# Patient Record
Sex: FEMALE | Race: WHITE | Employment: UNEMPLOYED | URBAN - METROPOLITAN AREA
[De-identification: names, ages, dates, MRNs, and addresses within clinical notes are randomized per-mention and may not be internally consistent; named-entity substitution may affect disease eponyms.]

---

## 2017-02-24 DIAGNOSIS — F33.1 MODERATE EPISODE OF RECURRENT MAJOR DEPRESSIVE DISORDER (HCC): ICD-10-CM

## 2017-03-01 RX ORDER — SERTRALINE HYDROCHLORIDE 100 MG/1
TABLET, FILM COATED ORAL
Qty: 30 TABLET | Refills: 1 | Status: SHIPPED | OUTPATIENT
Start: 2017-03-01 | End: 2017-05-11 | Stop reason: SDUPTHER

## 2017-04-04 DIAGNOSIS — J01.01 ACUTE RECURRENT MAXILLARY SINUSITIS: ICD-10-CM

## 2017-04-04 DIAGNOSIS — R06.2 WHEEZING SYMPTOM: ICD-10-CM

## 2017-04-04 RX ORDER — MONTELUKAST SODIUM 10 MG/1
TABLET ORAL
Qty: 30 TABLET | Refills: 3 | Status: SHIPPED | OUTPATIENT
Start: 2017-04-04 | End: 2017-07-22 | Stop reason: SDUPTHER

## 2017-05-02 ENCOUNTER — OFFICE VISIT (OUTPATIENT)
Dept: FAMILY MEDICINE CLINIC | Age: 27
End: 2017-05-02

## 2017-05-02 VITALS
DIASTOLIC BLOOD PRESSURE: 89 MMHG | WEIGHT: 293 LBS | HEART RATE: 94 BPM | BODY MASS INDEX: 44.41 KG/M2 | TEMPERATURE: 97.9 F | HEIGHT: 68 IN | SYSTOLIC BLOOD PRESSURE: 145 MMHG

## 2017-05-02 DIAGNOSIS — G43.109 MIGRAINE WITH AURA AND WITHOUT STATUS MIGRAINOSUS, NOT INTRACTABLE: ICD-10-CM

## 2017-05-02 DIAGNOSIS — F40.01 PANIC DISORDER WITH AGORAPHOBIA: ICD-10-CM

## 2017-05-02 DIAGNOSIS — R79.82 ELEVATED C-REACTIVE PROTEIN (CRP): ICD-10-CM

## 2017-05-02 DIAGNOSIS — E55.9 VITAMIN D DEFICIENCY: ICD-10-CM

## 2017-05-02 DIAGNOSIS — E78.00 HYPERCHOLESTEREMIA: ICD-10-CM

## 2017-05-02 DIAGNOSIS — E66.01 OBESITY, CLASS III, BMI 40-49.9 (MORBID OBESITY) (HCC): ICD-10-CM

## 2017-05-02 DIAGNOSIS — R06.2 WHEEZING: ICD-10-CM

## 2017-05-02 DIAGNOSIS — Z88.9 MULTIPLE ALLERGIES: ICD-10-CM

## 2017-05-02 DIAGNOSIS — R19.7 DIARRHEA, UNSPECIFIED TYPE: ICD-10-CM

## 2017-05-02 DIAGNOSIS — I15.8 OTHER SECONDARY HYPERTENSION: Primary | ICD-10-CM

## 2017-05-02 LAB
ANION GAP SERPL CALCULATED.3IONS-SCNC: 16 MEQ/L (ref 8–16)
BASOPHILS # BLD: 0.3 %
BASOPHILS ABSOLUTE: 0 THOU/MM3 (ref 0–0.1)
BUN BLDV-MCNC: 11 MG/DL (ref 7–22)
CALCIUM SERPL-MCNC: 9.5 MG/DL (ref 8.5–10.5)
CHLORIDE BLD-SCNC: 104 MEQ/L (ref 98–111)
CHOLESTEROL, TOTAL: 202 MG/DL (ref 100–199)
CO2: 23 MEQ/L (ref 23–33)
CREAT SERPL-MCNC: 0.4 MG/DL (ref 0.4–1.2)
CREATININE URINE POCT: NORMAL
EOSINOPHIL # BLD: 1.8 %
EOSINOPHILS ABSOLUTE: 0.1 THOU/MM3 (ref 0–0.4)
GFR SERPL CREATININE-BSD FRML MDRD: > 90 ML/MIN/1.73M2
GLUCOSE BLD-MCNC: 119 MG/DL (ref 70–108)
HCT VFR BLD CALC: 41.4 % (ref 37–47)
HDLC SERPL-MCNC: 42 MG/DL
HEMOGLOBIN: 13.9 GM/DL (ref 12–16)
LDL CHOLESTEROL CALCULATED: 129 MG/DL
LYMPHOCYTES # BLD: 26.4 %
LYMPHOCYTES ABSOLUTE: 2.1 THOU/MM3 (ref 1–4.8)
MAGNESIUM: 2 MG/DL (ref 1.6–2.4)
MCH RBC QN AUTO: 29.5 PG (ref 27–31)
MCHC RBC AUTO-ENTMCNC: 33.6 GM/DL (ref 33–37)
MCV RBC AUTO: 87.8 FL (ref 81–99)
MICROALBUMIN/CREAT 24H UR: NORMAL MG/G{CREAT}
MICROALBUMIN/CREAT UR-RTO: NORMAL
MONOCYTES # BLD: 5.7 %
MONOCYTES ABSOLUTE: 0.5 THOU/MM3 (ref 0.4–1.3)
NUCLEATED RED BLOOD CELLS: 0 /100 WBC
PDW BLD-RTO: 13.9 % (ref 11.5–14.5)
PLATELET # BLD: 251 THOU/MM3 (ref 130–400)
PMV BLD AUTO: 8.5 MCM (ref 7.4–10.4)
POTASSIUM SERPL-SCNC: 4.4 MEQ/L (ref 3.5–5.2)
PTH INTACT: 71.2 PG/ML (ref 15–65)
RBC # BLD: 4.72 MILL/MM3 (ref 4.2–5.4)
RBC # BLD: NORMAL 10*6/UL
SEDIMENTATION RATE, ERYTHROCYTE: 12 MM/HR (ref 0–20)
SEG NEUTROPHILS: 65.8 %
SEGMENTED NEUTROPHILS ABSOLUTE COUNT: 5.3 THOU/MM3 (ref 1.8–7.7)
SODIUM BLD-SCNC: 143 MEQ/L (ref 135–145)
TRIGL SERPL-MCNC: 156 MG/DL (ref 0–199)
TSH SERPL DL<=0.05 MIU/L-ACNC: 2.46 MCIU/ML (ref 0.4–4.2)
VITAMIN D 25-HYDROXY: 22 NG/ML (ref 30–100)
WBC # BLD: 8 THOU/MM3 (ref 4.8–10.8)

## 2017-05-02 PROCEDURE — 82044 UR ALBUMIN SEMIQUANTITATIVE: CPT | Performed by: FAMILY MEDICINE

## 2017-05-02 PROCEDURE — 36415 COLL VENOUS BLD VENIPUNCTURE: CPT | Performed by: FAMILY MEDICINE

## 2017-05-02 PROCEDURE — 99214 OFFICE O/P EST MOD 30 MIN: CPT | Performed by: FAMILY MEDICINE

## 2017-05-02 RX ORDER — BLOOD PRESSURE TEST KIT
KIT MISCELLANEOUS
Qty: 1 KIT | Refills: 0 | Status: SHIPPED | OUTPATIENT
Start: 2017-05-02 | End: 2021-07-27

## 2017-05-02 RX ORDER — SUMATRIPTAN 50 MG/1
50 TABLET, FILM COATED ORAL
Qty: 9 TABLET | Refills: 3 | Status: SHIPPED | OUTPATIENT
Start: 2017-05-02 | End: 2017-06-01 | Stop reason: SDUPTHER

## 2017-05-02 RX ORDER — TOPIRAMATE 25 MG/1
25 TABLET ORAL NIGHTLY
Qty: 120 TABLET | Refills: 3 | Status: SHIPPED | OUTPATIENT
Start: 2017-05-02 | End: 2017-08-22 | Stop reason: SDUPTHER

## 2017-05-03 LAB — THYROXINE (T4): 10 UG/DL (ref 4.5–12)

## 2017-05-04 LAB
C-REACTIVE PROTEIN, HIGH SENSITIVITY: 16.3 MG/L
THYROGLOBULIN: NORMAL
THYROID PEROXIDASE ANTIBODY: < 0.3 IU/ML (ref 0–9)

## 2017-05-05 ENCOUNTER — TELEPHONE (OUTPATIENT)
Dept: FAMILY MEDICINE CLINIC | Age: 27
End: 2017-05-05

## 2017-05-05 ASSESSMENT — ENCOUNTER SYMPTOMS
TROUBLE SWALLOWING: 0
SHORTNESS OF BREATH: 0
VOMITING: 0
CONSTIPATION: 0
NAUSEA: 0
BLOOD IN STOOL: 0
ABDOMINAL PAIN: 0
COUGH: 0
DIARRHEA: 0
EYE PAIN: 0
WHEEZING: 1

## 2017-05-09 ENCOUNTER — TELEPHONE (OUTPATIENT)
Dept: FAMILY MEDICINE CLINIC | Age: 27
End: 2017-05-09

## 2017-05-09 DIAGNOSIS — J01.00 ACUTE NON-RECURRENT MAXILLARY SINUSITIS: Primary | ICD-10-CM

## 2017-05-09 LAB
ALLERGEN BARLEY IGE: < 0.1 KU/L
ALLERGEN BEEF: < 0.1 KU/L
ALLERGEN BERMUDA GRASS IGE: < 0.1 KU/L
ALLERGEN BIRCH IGE: < 0.1 KU/L
ALLERGEN BOX ELDER: 0.33 KU/L
ALLERGEN CABBAGE IGE: < 0.1 KU/L
ALLERGEN CARROT IGE: < 0.1 KU/L
ALLERGEN CAT DANDER IGE: < 0.1 KU/L
ALLERGEN CHICKEN IGE: < 0.1 KU/L
ALLERGEN CODFISH IGE: < 0.1 KU/L
ALLERGEN COMMON SHORT RAGWEED IGE: < 0.1 KU/L
ALLERGEN CORN IGE: < 0.1 KU/L
ALLERGEN COTTONWOOD: < 0.1 KU/L
ALLERGEN CRAB IGE: < 0.1 KU/L
ALLERGEN D. FARINAE (DUST MITE): < 0.1 KU/L
ALLERGEN DOG DANDER IGE: < 0.1 KU/L
ALLERGEN EGG WHITE IGE: < 0.1 KU/L
ALLERGEN ELM IGE: < 0.1 KU/L
ALLERGEN FUNGI/MOLD A. ALTERNATA IGE: < 0.1 KU/L
ALLERGEN FUNGI/MOLD A. FUMIGATUS IGE: < 0.1 KU/L
ALLERGEN FUNGI/MOLD M.RACEMOSUS IGE: < 0.1 KU/L
ALLERGEN FUNGI/MOLD P. NOTATUM IGE: < 0.1 KU/L
ALLERGEN GERMAN COCKROACH IGE: < 0.1 KU/L
ALLERGEN GRAPE IGE: < 0.1 KU/L
ALLERGEN INTERPRETATION/SCORE: NORMAL
ALLERGEN LETTUCE IGE: < 0.1 KU/L
ALLERGEN MILK IGE: < 0.1 KU/L
ALLERGEN MITE DUST PTERONYSSINUS IGE: < 0.1 KU/L
ALLERGEN MOUNTAIN CEDAR: < 0.1 KU/L
ALLERGEN MOUSE EPITHELIA IGE: < 0.1 KU/L
ALLERGEN NAVY BEAN: < 0.1 KU/L
ALLERGEN OAT: < 0.1 KU/L
ALLERGEN ORANGE IGE: < 0.1 KU/L
ALLERGEN PEANUT (F13) IGE: < 0.1 KU/L
ALLERGEN PECAN TREE IGE: < 0.1 KU/L
ALLERGEN PEPPER C. ANNUUM IGE: < 0.1 KU/L
ALLERGEN PORK: < 0.1 KU/L
ALLERGEN POTATO IGE: < 0.1 KU/L
ALLERGEN RICE IGE: < 0.1 KU/L
ALLERGEN RUSSIAN THISTLE IGE: < 0.1 KU/L
ALLERGEN RYE IGE: < 0.1 KU/L
ALLERGEN SHEEP SORREL (W18) IGE: < 0.1 KU/L
ALLERGEN SHRIMP IGE: < 0.1 KU/L
ALLERGEN SOYBEAN IGE: < 0.1 KU/L
ALLERGEN TIMOTHY GRASS: < 0.1 KU/L
ALLERGEN TOMATO IGE: < 0.1 KU/L
ALLERGEN TREE SYCAMORE: < 0.1 KU/L
ALLERGEN TUNA IGE: < 0.1 KU/L
ALLERGEN WALNUT TREE IGE: < 0.1 KU/L
ALLERGEN WEED, PIGWEED IGE: < 0.1 KU/L
ALLERGEN WHEAT IGE: < 0.1 KU/L
ALLERGEN WHITE ASH: < 0.1 KU/L
ALLERGEN WHITE MULBERRY TREE, IGE: < 0.1 KU/L
ALLERGEN, FUNGI/MOLD: < 0.1 KU/L
ALLERGEN, TREE, OAK: < 0.1 KU/L
IMMUNOGLOBULIN E: 8 KU/L

## 2017-05-09 RX ORDER — GUAIFENESIN 600 MG/1
600 TABLET, EXTENDED RELEASE ORAL 2 TIMES DAILY
Qty: 20 TABLET | Refills: 1 | Status: SHIPPED | OUTPATIENT
Start: 2017-05-09

## 2017-05-09 RX ORDER — AZITHROMYCIN 250 MG/1
TABLET, FILM COATED ORAL
Qty: 1 PACKET | Refills: 0 | Status: SHIPPED | OUTPATIENT
Start: 2017-05-09 | End: 2017-05-19

## 2017-05-11 ENCOUNTER — TELEPHONE (OUTPATIENT)
Dept: FAMILY MEDICINE CLINIC | Age: 27
End: 2017-05-11

## 2017-05-11 DIAGNOSIS — F33.1 MODERATE EPISODE OF RECURRENT MAJOR DEPRESSIVE DISORDER (HCC): ICD-10-CM

## 2017-05-11 RX ORDER — SERTRALINE HYDROCHLORIDE 100 MG/1
TABLET, FILM COATED ORAL
Qty: 30 TABLET | Refills: 1 | Status: SHIPPED | OUTPATIENT
Start: 2017-05-11 | End: 2017-05-30

## 2017-06-01 ENCOUNTER — OFFICE VISIT (OUTPATIENT)
Dept: FAMILY MEDICINE CLINIC | Age: 27
End: 2017-06-01

## 2017-06-01 VITALS
OXYGEN SATURATION: 96 % | BODY MASS INDEX: 44.41 KG/M2 | DIASTOLIC BLOOD PRESSURE: 74 MMHG | TEMPERATURE: 98.2 F | HEIGHT: 68 IN | SYSTOLIC BLOOD PRESSURE: 133 MMHG | WEIGHT: 293 LBS | HEART RATE: 71 BPM

## 2017-06-01 DIAGNOSIS — G43.109 MIGRAINE WITH AURA AND WITHOUT STATUS MIGRAINOSUS, NOT INTRACTABLE: ICD-10-CM

## 2017-06-01 DIAGNOSIS — E55.9 VITAMIN D DEFICIENCY: ICD-10-CM

## 2017-06-01 DIAGNOSIS — R63.5 WEIGHT GAIN: Primary | ICD-10-CM

## 2017-06-01 DIAGNOSIS — J01.00 ACUTE NON-RECURRENT MAXILLARY SINUSITIS: ICD-10-CM

## 2017-06-01 DIAGNOSIS — F40.01 PANIC DISORDER WITH AGORAPHOBIA: ICD-10-CM

## 2017-06-01 DIAGNOSIS — J01.01 ACUTE RECURRENT MAXILLARY SINUSITIS: ICD-10-CM

## 2017-06-01 DIAGNOSIS — F41.9 ANXIETY: ICD-10-CM

## 2017-06-01 DIAGNOSIS — R06.2 WHEEZING SYMPTOM: ICD-10-CM

## 2017-06-01 DIAGNOSIS — E66.01 OBESITY, CLASS III, BMI 40-49.9 (MORBID OBESITY) (HCC): ICD-10-CM

## 2017-06-01 PROCEDURE — 99214 OFFICE O/P EST MOD 30 MIN: CPT | Performed by: NURSE PRACTITIONER

## 2017-06-01 RX ORDER — AMPICILLIN TRIHYDRATE 250 MG
CAPSULE ORAL
Qty: 120 CAPSULE | Refills: 5 | Status: SHIPPED | OUTPATIENT
Start: 2017-06-01 | End: 2019-01-02 | Stop reason: SDUPTHER

## 2017-06-01 RX ORDER — SERTRALINE HYDROCHLORIDE 100 MG/1
150 TABLET, FILM COATED ORAL DAILY
COMMUNITY
End: 2017-06-01 | Stop reason: SDUPTHER

## 2017-06-01 RX ORDER — CHLORAL HYDRATE 500 MG
1000 CAPSULE ORAL 3 TIMES DAILY
Qty: 90 CAPSULE | Refills: 3 | Status: CANCELLED | OUTPATIENT
Start: 2017-06-01

## 2017-06-01 RX ORDER — TOPIRAMATE 25 MG/1
25 TABLET ORAL NIGHTLY
Qty: 120 TABLET | Refills: 3 | Status: CANCELLED | OUTPATIENT
Start: 2017-06-01

## 2017-06-01 RX ORDER — MONTELUKAST SODIUM 10 MG/1
TABLET ORAL
Qty: 30 TABLET | Refills: 3 | Status: CANCELLED | OUTPATIENT
Start: 2017-06-01

## 2017-06-01 RX ORDER — GUAIFENESIN 600 MG/1
600 TABLET, EXTENDED RELEASE ORAL 2 TIMES DAILY
Qty: 20 TABLET | Refills: 1 | Status: CANCELLED | OUTPATIENT
Start: 2017-06-01

## 2017-06-01 RX ORDER — BUSPIRONE HYDROCHLORIDE 5 MG/1
5 TABLET ORAL 3 TIMES DAILY
Qty: 90 TABLET | Refills: 1 | Status: SHIPPED | OUTPATIENT
Start: 2017-06-01 | End: 2019-01-02 | Stop reason: SDUPTHER

## 2017-06-01 RX ORDER — SERTRALINE HYDROCHLORIDE 100 MG/1
150 TABLET, FILM COATED ORAL DAILY
Qty: 45 TABLET | Refills: 2 | Status: SHIPPED | OUTPATIENT
Start: 2017-06-01 | End: 2018-01-24 | Stop reason: SDUPTHER

## 2017-06-01 RX ORDER — FLUTICASONE PROPIONATE 50 MCG
1 SPRAY, SUSPENSION (ML) NASAL DAILY
Qty: 1 BOTTLE | Refills: 1 | Status: CANCELLED | OUTPATIENT
Start: 2017-06-01

## 2017-06-01 RX ORDER — SUMATRIPTAN 50 MG/1
50 TABLET, FILM COATED ORAL
Qty: 9 TABLET | Refills: 3 | Status: SHIPPED | OUTPATIENT
Start: 2017-06-01 | End: 2019-01-02 | Stop reason: SDUPTHER

## 2017-06-01 ASSESSMENT — ENCOUNTER SYMPTOMS
RHINORRHEA: 0
SHORTNESS OF BREATH: 0
SORE THROAT: 0
ABDOMINAL PAIN: 0
EYE REDNESS: 0
DIARRHEA: 0
BLOOD IN STOOL: 0
CONSTIPATION: 0
COUGH: 0
ANAL BLEEDING: 0
ABDOMINAL DISTENTION: 0
COLOR CHANGE: 0
NAUSEA: 0
EYE DISCHARGE: 0

## 2017-07-22 DIAGNOSIS — R06.2 WHEEZING SYMPTOM: ICD-10-CM

## 2017-07-22 DIAGNOSIS — J01.01 ACUTE RECURRENT MAXILLARY SINUSITIS: ICD-10-CM

## 2017-07-25 RX ORDER — MONTELUKAST SODIUM 10 MG/1
TABLET ORAL
Qty: 30 TABLET | Refills: 3 | Status: SHIPPED | OUTPATIENT
Start: 2017-07-25 | End: 2017-11-13 | Stop reason: SDUPTHER

## 2017-08-22 DIAGNOSIS — G43.109 MIGRAINE WITH AURA AND WITHOUT STATUS MIGRAINOSUS, NOT INTRACTABLE: ICD-10-CM

## 2017-08-22 RX ORDER — TOPIRAMATE 25 MG/1
TABLET ORAL
Qty: 120 TABLET | Refills: 3 | Status: SHIPPED | OUTPATIENT
Start: 2017-08-22 | End: 2019-04-16 | Stop reason: SDUPTHER

## 2017-10-23 DIAGNOSIS — E66.01 OBESITY, CLASS III, BMI 40-49.9 (MORBID OBESITY) (HCC): ICD-10-CM

## 2017-10-23 RX ORDER — FLUTICASONE PROPIONATE 50 MCG
SPRAY, SUSPENSION (ML) NASAL
Qty: 1 BOTTLE | Refills: 1 | Status: SHIPPED | OUTPATIENT
Start: 2017-10-23 | End: 2019-01-02 | Stop reason: SDUPTHER

## 2017-10-23 RX ORDER — MAGNESIUM GLUCONATE 27 MG(500)
1000 TABLET ORAL 3 TIMES DAILY
Qty: 90 CAPSULE | Refills: 3 | Status: SHIPPED | OUTPATIENT
Start: 2017-10-23 | End: 2019-01-02 | Stop reason: SDUPTHER

## 2017-10-27 ENCOUNTER — HOSPITAL ENCOUNTER (OUTPATIENT)
Age: 27
Discharge: HOME | End: 2017-10-27
Payer: MEDICARE

## 2017-10-27 ENCOUNTER — TELEPHONE (OUTPATIENT)
Dept: FAMILY MEDICINE CLINIC | Age: 27
End: 2017-10-27

## 2017-10-27 ENCOUNTER — HOSPITAL ENCOUNTER (OUTPATIENT)
Dept: ULTRASOUND IMAGING | Age: 27
Discharge: HOME | End: 2017-10-27
Payer: MEDICARE

## 2017-10-27 ENCOUNTER — OFFICE VISIT (OUTPATIENT)
Dept: FAMILY MEDICINE CLINIC | Age: 27
End: 2017-10-27
Payer: MEDICARE

## 2017-10-27 VITALS
DIASTOLIC BLOOD PRESSURE: 82 MMHG | WEIGHT: 293 LBS | HEIGHT: 68 IN | BODY MASS INDEX: 44.41 KG/M2 | TEMPERATURE: 98 F | HEART RATE: 81 BPM | SYSTOLIC BLOOD PRESSURE: 134 MMHG | OXYGEN SATURATION: 93 %

## 2017-10-27 DIAGNOSIS — Z78.9 USES BIRTH CONTROL: ICD-10-CM

## 2017-10-27 DIAGNOSIS — T36.95XA ANTIBIOTIC-INDUCED YEAST INFECTION: ICD-10-CM

## 2017-10-27 DIAGNOSIS — R39.15 URINARY URGENCY: ICD-10-CM

## 2017-10-27 DIAGNOSIS — B37.9 ANTIBIOTIC-INDUCED YEAST INFECTION: ICD-10-CM

## 2017-10-27 DIAGNOSIS — R31.0 GROSS HEMATURIA: ICD-10-CM

## 2017-10-27 DIAGNOSIS — R31.0 GROSS HEMATURIA: Primary | ICD-10-CM

## 2017-10-27 LAB
ALBUMIN SERPL-MCNC: 4.3 G/DL (ref 3.5–5.1)
ALP BLD-CCNC: 91 U/L (ref 38–126)
ALT SERPL-CCNC: 13 U/L (ref 11–66)
ANION GAP SERPL CALCULATED.3IONS-SCNC: 16 MEQ/L (ref 8–16)
ANISOCYTOSIS: ABNORMAL
AST SERPL-CCNC: 13 U/L (ref 5–40)
BASOPHILS # BLD: 0.3 %
BASOPHILS ABSOLUTE: 0 THOU/MM3 (ref 0–0.1)
BILIRUB SERPL-MCNC: 0.3 MG/DL (ref 0.3–1.2)
BUN BLDV-MCNC: 10 MG/DL (ref 7–22)
CALCIUM SERPL-MCNC: 9.6 MG/DL (ref 8.5–10.5)
CHLORIDE BLD-SCNC: 99 MEQ/L (ref 98–111)
CO2: 23 MEQ/L (ref 23–33)
CREAT SERPL-MCNC: 0.5 MG/DL (ref 0.4–1.2)
EOSINOPHIL # BLD: 0.8 %
EOSINOPHILS ABSOLUTE: 0.1 THOU/MM3 (ref 0–0.4)
GFR SERPL CREATININE-BSD FRML MDRD: > 90 ML/MIN/1.73M2
GLUCOSE BLD-MCNC: 85 MG/DL (ref 70–108)
HCT VFR BLD CALC: 42.9 % (ref 37–47)
HEMOGLOBIN: 14.3 GM/DL (ref 12–16)
LYMPHOCYTES # BLD: 16.1 %
LYMPHOCYTES ABSOLUTE: 2 THOU/MM3 (ref 1–4.8)
MCH RBC QN AUTO: 29.3 PG (ref 27–31)
MCHC RBC AUTO-ENTMCNC: 33.4 GM/DL (ref 33–37)
MCV RBC AUTO: 87.8 FL (ref 81–99)
MONOCYTES # BLD: 4.7 %
MONOCYTES ABSOLUTE: 0.6 THOU/MM3 (ref 0.4–1.3)
NUCLEATED RED BLOOD CELLS: 0 /100 WBC
PDW BLD-RTO: 14.5 % (ref 11.5–14.5)
PLATELET # BLD: 309 THOU/MM3 (ref 130–400)
PMV BLD AUTO: 7.8 MCM (ref 7.4–10.4)
POTASSIUM SERPL-SCNC: 4.7 MEQ/L (ref 3.5–5.2)
PREGNANCY, SERUM: NEGATIVE
RBC # BLD: 4.89 MILL/MM3 (ref 4.2–5.4)
SEG NEUTROPHILS: 78.1 %
SEGMENTED NEUTROPHILS ABSOLUTE COUNT: 9.5 THOU/MM3 (ref 1.8–7.7)
SODIUM BLD-SCNC: 138 MEQ/L (ref 135–145)
TOTAL PROTEIN: 7.6 G/DL (ref 6.1–8)
WBC # BLD: 12.2 THOU/MM3 (ref 4.8–10.8)

## 2017-10-27 PROCEDURE — 1036F TOBACCO NON-USER: CPT | Performed by: NURSE PRACTITIONER

## 2017-10-27 PROCEDURE — 99214 OFFICE O/P EST MOD 30 MIN: CPT | Performed by: NURSE PRACTITIONER

## 2017-10-27 PROCEDURE — G8484 FLU IMMUNIZE NO ADMIN: HCPCS | Performed by: NURSE PRACTITIONER

## 2017-10-27 PROCEDURE — G0444 DEPRESSION SCREEN ANNUAL: HCPCS | Performed by: NURSE PRACTITIONER

## 2017-10-27 PROCEDURE — 80053 COMPREHEN METABOLIC PANEL: CPT

## 2017-10-27 PROCEDURE — 36415 COLL VENOUS BLD VENIPUNCTURE: CPT

## 2017-10-27 PROCEDURE — 76770 US EXAM ABDO BACK WALL COMP: CPT

## 2017-10-27 PROCEDURE — 84703 CHORIONIC GONADOTROPIN ASSAY: CPT

## 2017-10-27 PROCEDURE — G8417 CALC BMI ABV UP PARAM F/U: HCPCS | Performed by: NURSE PRACTITIONER

## 2017-10-27 PROCEDURE — G8427 DOCREV CUR MEDS BY ELIG CLIN: HCPCS | Performed by: NURSE PRACTITIONER

## 2017-10-27 PROCEDURE — 85025 COMPLETE CBC W/AUTO DIFF WBC: CPT

## 2017-10-27 RX ORDER — FLUCONAZOLE 200 MG/1
TABLET ORAL
Qty: 2 TABLET | Refills: 0 | Status: SHIPPED | OUTPATIENT
Start: 2017-10-27 | End: 2018-12-31

## 2017-10-27 RX ORDER — KETOROLAC TROMETHAMINE 30 MG/ML
60 INJECTION, SOLUTION INTRAMUSCULAR; INTRAVENOUS ONCE
Status: COMPLETED | OUTPATIENT
Start: 2017-10-27 | End: 2017-10-27

## 2017-10-27 RX ORDER — PHENAZOPYRIDINE HYDROCHLORIDE 200 MG/1
200 TABLET, FILM COATED ORAL 3 TIMES DAILY PRN
Qty: 15 TABLET | Refills: 0 | Status: SHIPPED | OUTPATIENT
Start: 2017-10-27 | End: 2017-11-01

## 2017-10-27 RX ORDER — CIPROFLOXACIN 500 MG/1
500 TABLET, FILM COATED ORAL 2 TIMES DAILY
Qty: 20 TABLET | Refills: 0 | Status: SHIPPED | OUTPATIENT
Start: 2017-10-27 | End: 2017-11-06

## 2017-10-27 RX ORDER — KETOROLAC TROMETHAMINE 30 MG/ML
60 INJECTION, SOLUTION INTRAMUSCULAR; INTRAVENOUS ONCE
Qty: 2 ML | Refills: 0
Start: 2017-10-27 | End: 2017-10-27 | Stop reason: CLARIF

## 2017-10-27 RX ADMIN — KETOROLAC TROMETHAMINE 60 MG: 30 INJECTION, SOLUTION INTRAMUSCULAR; INTRAVENOUS at 12:03

## 2017-10-27 ASSESSMENT — ENCOUNTER SYMPTOMS
BLOOD IN STOOL: 0
ABDOMINAL PAIN: 0
RHINORRHEA: 0
CONSTIPATION: 0
ANAL BLEEDING: 0
SORE THROAT: 0
COLOR CHANGE: 0
ABDOMINAL DISTENTION: 0
SHORTNESS OF BREATH: 0
DIARRHEA: 0
NAUSEA: 0
EYE REDNESS: 0
COUGH: 0
EYE DISCHARGE: 0

## 2017-10-27 ASSESSMENT — PATIENT HEALTH QUESTIONNAIRE - PHQ9
10. IF YOU CHECKED OFF ANY PROBLEMS, HOW DIFFICULT HAVE THESE PROBLEMS MADE IT FOR YOU TO DO YOUR WORK, TAKE CARE OF THINGS AT HOME, OR GET ALONG WITH OTHER PEOPLE: 0
SUM OF ALL RESPONSES TO PHQ QUESTIONS 1-9: 5
5. POOR APPETITE OR OVEREATING: 1
6. FEELING BAD ABOUT YOURSELF - OR THAT YOU ARE A FAILURE OR HAVE LET YOURSELF OR YOUR FAMILY DOWN: 0
3. TROUBLE FALLING OR STAYING ASLEEP: 1
2. FEELING DOWN, DEPRESSED OR HOPELESS: 1
1. LITTLE INTEREST OR PLEASURE IN DOING THINGS: 1
SUM OF ALL RESPONSES TO PHQ9 QUESTIONS 1 & 2: 2
8. MOVING OR SPEAKING SO SLOWLY THAT OTHER PEOPLE COULD HAVE NOTICED. OR THE OPPOSITE, BEING SO FIGETY OR RESTLESS THAT YOU HAVE BEEN MOVING AROUND A LOT MORE THAN USUAL: 0
9. THOUGHTS THAT YOU WOULD BE BETTER OFF DEAD, OR OF HURTING YOURSELF: 0
7. TROUBLE CONCENTRATING ON THINGS, SUCH AS READING THE NEWSPAPER OR WATCHING TELEVISION: 0
4. FEELING TIRED OR HAVING LITTLE ENERGY: 1

## 2017-10-27 NOTE — TELEPHONE ENCOUNTER
----- Message from Precious Mo CNP sent at 10/27/2017  2:13 PM EDT -----  Us of the kidneys and bladder are normal

## 2017-10-27 NOTE — PATIENT INSTRUCTIONS
treatment. Sometimes your urine may look red or brown even though it does not contain blood. For example, not getting enough fluids (dehydration), taking certain medicines, or having a liver problem can change the color of your urine. Eating foods such as beets, rhubarb, or blackberries or foods with red food coloring can make your urine look red or pink. Follow-up care is a key part of your treatment and safety. Be sure to make and go to all appointments, and call your doctor if you are having problems. It's also a good idea to know your test results and keep a list of the medicines you take. When should you call for help? Call your doctor now or seek immediate medical care if:  · You have symptoms of a urinary infection. For example:  ¨ You have pus in your urine. ¨ You have pain in your back just below your rib cage. This is called flank pain. ¨ You have a fever, chills, or body aches. ¨ It hurts to urinate. ¨ You have groin or belly pain. · You have more blood in your urine. Watch closely for changes in your health, and be sure to contact your doctor if:  · You have new urination problems. · You do not get better as expected. Where can you learn more? Go to https://DerbyJackpot.Samba [email or web address] org and sign in to your [employer] account. Enter F592 in the [facility] box to learn more about \"Blood in the Urine: Care Instructions. \"     If you do not have an account, please click on the \"Sign Up Now\" link. Current as of: March 20, 2017  Content Version: 11.3  © 1092-4447 DOCUSYS. Care instructions adapted under license by [facility] ([facility]). If you have questions about a medical condition or this instruction, always ask your healthcare professional. [place] [address] any warranty or liability for your use of this information.        Patient Education        Urinary Tract Infection in Women: Care Instructions  Your Care Instructions    A urinary tract infection, or

## 2017-10-27 NOTE — PROGRESS NOTES
DTaP/Tdap/Td vaccine (1 - Tdap) 07/16/2008    Flu vaccine (1) 09/01/2017    Cervical cancer screen  05/26/2018    HIV screen  Completed     Past Medical History:   Diagnosis Date    Anxiety 06/2016    Postpartum depression 2010      Past Surgical History:   Procedure Laterality Date    WISDOM TOOTH EXTRACTION  2006     Family History   Problem Relation Age of Onset   [de-identified] / [place] Mother     High Blood Pressure Father     High Blood Pressure Brother     Diabetes Maternal Grandmother      Social History   Substance Use Topics    Smoking status: Former Smoker     Quit date: 5/25/2006    Smokeless tobacco: Never Used    Alcohol use No      Current Outpatient Prescriptions   Medication Sig Dispense Refill    phenazopyridine (PYRIDIUM) 200 MG tablet Take 1 tablet by mouth 3 times daily as needed for Pain 15 tablet 0    fluconazole (DIFLUCAN) 200 MG tablet Take one tablet today, repeat in 1 week 2 tablet 0    ciprofloxacin (CIPRO) 500 MG tablet Take 1 tablet by mouth 2 times daily for 10 days 20 tablet 0    Omega-3 Fatty Acids (OMEGA III EPA+DHA) 1000 MG CAPS TAKE 1 CAPSULE BY MOUTH 3 TIMES DAILY 90 capsule 3    fluticasone (FLONASE) 50 MCG/ACT nasal spray instill 1 spray into each nostril once daily 1 Bottle 1    topiramate (TOPAMAX) 25 MG tablet take 1 tablet once daily at bedtime - MAY INCREASE TO 2 TABLETS TWICE A  tablet 3    montelukast (SINGULAIR) 10 MG tablet take 1 tablet once daily 30 tablet 3    sertraline (ZOLOFT) 50 MG tablet take 1 tablet once daily 30 tablet 1    Vitamins-Lipotropics (BALANCED B-100 COMPLEX CR) TBCR Take 1 tablet by mouth 4 times daily (after meals and at bedtime) 120 tablet 5    Cinnamon 500 MG CAPS Take four times daily before meals and at bedtime 120 capsule 5    Cholecalciferol (VITAMIN D3) 5000 UNITS TABS One tablet daily 30 tablet 5    sertraline (ZOLOFT) 100 MG tablet Take 1.5 tablets by mouth daily 1.5 tablet daily.  45 tablet 2    busPIRone (BUSPAR) 5 MG tablet Take 1 tablet by mouth 3 times daily 90 tablet 1    guaiFENesin (MUCINEX) 600 MG extended release tablet Take 1 tablet by mouth 2 times daily 20 tablet 1    Blood Pressure Monitor KIT Take blood pressure daily 1 kit 0    pseudoephedrine (SUDAFED) 30 MG tablet Take 30 mg by mouth every 4 hours as needed for Congestion      Elastic Bandages & Supports (B & B ELASTIC ANKLE BRACE) MISC Wear brace when up walking during the day 1 each 0    Levonorgestrel-Ethinyl Estrad (LEVLEN, 28, PO) Take by mouth      SUMAtriptan (IMITREX) 50 MG tablet Take 1 tablet by mouth once as needed for Migraine 9 tablet 3     No current facility-administered medications for this visit. Allergies   Allergen Reactions    Latex Rash     And itching    Adhesive Tape Rash     Subjective:    Review of Systems   Constitutional: Negative for chills, fatigue and fever. HENT: Negative for congestion, ear pain, postnasal drip, rhinorrhea and sore throat. Eyes: Negative for discharge and redness. Respiratory: Negative for cough and shortness of breath. Cardiovascular: Negative for chest pain and leg swelling. Gastrointestinal: Negative for abdominal distention, abdominal pain, anal bleeding, blood in stool, constipation, diarrhea and nausea. Genitourinary: Positive for decreased urine volume, difficulty urinating, dysuria, frequency, hematuria, urgency and vaginal discharge. Skin: Negative for color change and rash. Neurological: Negative for facial asymmetry, speech difficulty and weakness. Hematological: Does not bruise/bleed easily. Psychiatric/Behavioral: Negative for agitation and confusion. Objective:     Vitals:    10/27/17 1044   BP: 134/82   Site: Right Arm   Position: Sitting   Cuff Size: Large Adult   Pulse: 81   Temp: 98 °F (36.7 °C)   TempSrc: Oral   SpO2: 93%   Weight: (!) 305 lb (138.3 kg)   Height: 5' 7.52\" (1.715 m)     Body mass index is 47.04 kg/m².     Wt Readings

## 2017-10-27 NOTE — TELEPHONE ENCOUNTER
Patient is complaining of burning, frequent urination, hard time going at times but feels pressure, she is asking if something can be phoned in to [facility] on [address], please advise at [phone]

## 2017-10-27 NOTE — PROGRESS NOTES
After obtaining consent, and per orders of Ingrid, injection of Toradol given in Right upper quad. gluteus by Renetta Johnson. Patient instructed to remain in clinic for 20 minutes afterwards, and to report any adverse reaction to me immediately.

## 2017-10-27 NOTE — TELEPHONE ENCOUNTER
----- Message from Cheyanne Chan CNP sent at 10/27/2017  2:40 PM EDT -----  Pregnancy test was negative    White could is a little elevated, which indicates an infection.  Please take the medication as directed and follow-up as discussed

## 2017-10-27 NOTE — PROGRESS NOTES
Visit Information    Have you changed or started any medications since your last visit including any over-the-counter medicines, vitamins, or herbal medicines? no   Are you having any side effects from any of your medications? -  no  Have you stopped taking any of your medications? Is so, why? -  no    Have you seen any other physician or provider since your last visit? No  Have you had any other diagnostic tests since your last visit? No  Have you been seen in the emergency room and/or had an admission to a hospital since we last saw you? No  Have you had your routine dental cleaning in the past 6 months? no    Have you activated your pickrset account? If not, what are your barriers?  Yes     Patient Care Team:  Agatha Tang DO as PCP - General (Family Medicine)    Medical History Review  Past Medical, Family, and Social History reviewed and does contribute to the patient presenting condition    Health Maintenance   Topic Date Due    DTaP/Tdap/Td vaccine (1 - Tdap) 07/16/2008    Flu vaccine (1) 09/01/2017    Cervical cancer screen  05/26/2018    HIV screen  Completed

## 2017-10-29 LAB
ORGANISM: ABNORMAL
URINE CULTURE, ROUTINE: ABNORMAL

## 2017-10-31 ENCOUNTER — TELEPHONE (OUTPATIENT)
Dept: FAMILY MEDICINE CLINIC | Age: 27
End: 2017-10-31

## 2017-11-13 ENCOUNTER — TELEPHONE (OUTPATIENT)
Dept: FAMILY MEDICINE CLINIC | Age: 27
End: 2017-11-13

## 2017-11-13 DIAGNOSIS — J01.01 ACUTE RECURRENT MAXILLARY SINUSITIS: ICD-10-CM

## 2017-11-13 DIAGNOSIS — R06.2 WHEEZING SYMPTOM: ICD-10-CM

## 2017-11-13 RX ORDER — MONTELUKAST SODIUM 10 MG/1
TABLET ORAL
Qty: 30 TABLET | Refills: 3 | Status: SHIPPED | OUTPATIENT
Start: 2017-11-13 | End: 2019-01-02 | Stop reason: SDUPTHER

## 2018-01-24 DIAGNOSIS — F41.9 ANXIETY: ICD-10-CM

## 2018-01-24 DIAGNOSIS — F40.01 PANIC DISORDER WITH AGORAPHOBIA: ICD-10-CM

## 2018-01-24 RX ORDER — LEVONORGESTREL AND ETHINYL ESTRADIOL 0.15-0.03
1 KIT ORAL DAILY
Qty: 1 PACKET | Refills: 3 | Status: SHIPPED | OUTPATIENT
Start: 2018-01-24 | End: 2018-04-22 | Stop reason: SDUPTHER

## 2018-01-24 RX ORDER — SERTRALINE HYDROCHLORIDE 100 MG/1
TABLET, FILM COATED ORAL
Qty: 45 TABLET | Refills: 2 | Status: SHIPPED | OUTPATIENT
Start: 2018-01-24 | End: 2018-01-24 | Stop reason: SDUPTHER

## 2018-01-24 RX ORDER — SERTRALINE HYDROCHLORIDE 100 MG/1
100 TABLET, FILM COATED ORAL DAILY
Qty: 45 TABLET | Refills: 2 | Status: SHIPPED | OUTPATIENT
Start: 2018-01-24 | End: 2018-06-14 | Stop reason: SDUPTHER

## 2018-01-24 NOTE — TELEPHONE ENCOUNTER
We can give a few months - should have her come in to tell me or Almas Weinstein how she is doing on them in the next few months

## 2018-01-24 NOTE — TELEPHONE ENCOUNTER
Patient hasnt seen her ob gyn for a couple years. Wants to know if dr John Shah could refill her birth control pills. She is completely out and has no insurance and cant afford appt right now. She takes [employer].     Pharm[facility] is [facility] [address]  dol 10/27/17  donv none

## 2018-04-23 RX ORDER — LEVONORGESTREL AND ETHINYL ESTRADIOL 0.15-0.03
KIT ORAL
Qty: 28 TABLET | Refills: 3 | Status: SHIPPED | OUTPATIENT
Start: 2018-04-23 | End: 2018-07-22 | Stop reason: SDUPTHER

## 2018-06-14 DIAGNOSIS — F41.9 ANXIETY: ICD-10-CM

## 2018-06-14 DIAGNOSIS — F40.01 PANIC DISORDER WITH AGORAPHOBIA: ICD-10-CM

## 2018-06-14 RX ORDER — SERTRALINE HYDROCHLORIDE 100 MG/1
150 TABLET, FILM COATED ORAL DAILY
Qty: 45 TABLET | Refills: 2 | Status: SHIPPED | OUTPATIENT
Start: 2018-06-14 | End: 2019-01-02 | Stop reason: SDUPTHER

## 2018-07-23 RX ORDER — LEVONORGESTREL AND ETHINYL ESTRADIOL 0.15-0.03
KIT ORAL
Qty: 28 TABLET | Refills: 3 | Status: SHIPPED | OUTPATIENT
Start: 2018-07-23 | End: 2018-11-05 | Stop reason: SDUPTHER

## 2018-07-23 NOTE — TELEPHONE ENCOUNTER
Last visit- 10/27/17  Next visit- Visit date not found    Requested Prescriptions     Pending Prescriptions Disp Refills    LILLOW 0.15-30 MG-MCG per tablet [Pharmacy Med Name: Keith Wyatt TABLET] 28 tablet 3     Sig: take 1 tablet by mouth once daily

## 2018-11-05 RX ORDER — LEVONORGESTREL AND ETHINYL ESTRADIOL 0.15-0.03
KIT ORAL
Qty: 28 TABLET | Refills: 0 | Status: SHIPPED | OUTPATIENT
Start: 2018-11-05 | End: 2018-12-10 | Stop reason: SDUPTHER

## 2018-12-10 NOTE — TELEPHONE ENCOUNTER
Alyssia Mckenzie called requesting a refill on the following medications:  Requested Prescriptions     Pending Prescriptions Disp Refills    levonorgestrel-ethinyl estradiol (LILLOW) 0.15-30 MG-MCG per tablet 28 tablet 0     Sig: take 1 tablet by mouth once daily     Pharmacy verified:  [facility]    Date of last visit: 10/27/2018  Date of next visit (if applicable): 4/5/8629

## 2018-12-11 RX ORDER — LEVONORGESTREL AND ETHINYL ESTRADIOL 0.15-0.03
KIT ORAL
Qty: 28 TABLET | Refills: 0 | Status: SHIPPED | OUTPATIENT
Start: 2018-12-11 | End: 2019-01-02 | Stop reason: SDUPTHER

## 2018-12-31 ENCOUNTER — HOSPITAL ENCOUNTER (EMERGENCY)
Age: 28
Discharge: HOME | End: 2018-12-31
Payer: MEDICARE

## 2018-12-31 VITALS
WEIGHT: 250 LBS | RESPIRATION RATE: 16 BRPM | DIASTOLIC BLOOD PRESSURE: 75 MMHG | OXYGEN SATURATION: 96 % | BODY MASS INDEX: 39.24 KG/M2 | TEMPERATURE: 99 F | HEART RATE: 92 BPM | HEIGHT: 67 IN | SYSTOLIC BLOOD PRESSURE: 148 MMHG

## 2018-12-31 DIAGNOSIS — J01.00 ACUTE NON-RECURRENT MAXILLARY SINUSITIS: Primary | ICD-10-CM

## 2018-12-31 PROCEDURE — 99213 OFFICE O/P EST LOW 20 MIN: CPT | Performed by: NURSE PRACTITIONER

## 2018-12-31 PROCEDURE — 99212 OFFICE O/P EST SF 10 MIN: CPT

## 2018-12-31 RX ORDER — AMOXICILLIN AND CLAVULANATE POTASSIUM 875; 125 MG/1; MG/1
1 TABLET, FILM COATED ORAL 2 TIMES DAILY
Qty: 20 TABLET | Refills: 0 | Status: SHIPPED | OUTPATIENT
Start: 2018-12-31 | End: 2019-01-10

## 2018-12-31 ASSESSMENT — PAIN SCALES - GENERAL: PAINLEVEL_OUTOF10: 6

## 2018-12-31 ASSESSMENT — PAIN DESCRIPTION - LOCATION: LOCATION: FACE

## 2019-01-02 ENCOUNTER — OFFICE VISIT (OUTPATIENT)
Dept: FAMILY MEDICINE CLINIC | Age: 29
End: 2019-01-02
Payer: MEDICARE

## 2019-01-02 VITALS
HEIGHT: 67 IN | DIASTOLIC BLOOD PRESSURE: 78 MMHG | OXYGEN SATURATION: 99 % | HEART RATE: 87 BPM | SYSTOLIC BLOOD PRESSURE: 124 MMHG | BODY MASS INDEX: 45.99 KG/M2 | RESPIRATION RATE: 12 BRPM | WEIGHT: 293 LBS | TEMPERATURE: 98.1 F

## 2019-01-02 DIAGNOSIS — F40.01 PANIC DISORDER WITH AGORAPHOBIA: ICD-10-CM

## 2019-01-02 DIAGNOSIS — J01.01 ACUTE RECURRENT MAXILLARY SINUSITIS: ICD-10-CM

## 2019-01-02 DIAGNOSIS — G43.109 MIGRAINE WITH AURA AND WITHOUT STATUS MIGRAINOSUS, NOT INTRACTABLE: ICD-10-CM

## 2019-01-02 DIAGNOSIS — Z00.00 ENCOUNTER FOR WELLNESS EXAMINATION: Primary | ICD-10-CM

## 2019-01-02 DIAGNOSIS — F41.9 ANXIETY: ICD-10-CM

## 2019-01-02 DIAGNOSIS — E55.9 VITAMIN D DEFICIENCY: ICD-10-CM

## 2019-01-02 DIAGNOSIS — E66.01 OBESITY, CLASS III, BMI 40-49.9 (MORBID OBESITY) (HCC): ICD-10-CM

## 2019-01-02 PROCEDURE — 99395 PREV VISIT EST AGE 18-39: CPT | Performed by: NURSE PRACTITIONER

## 2019-01-02 RX ORDER — SUMATRIPTAN 50 MG/1
50 TABLET, FILM COATED ORAL
Qty: 9 TABLET | Refills: 3 | Status: SHIPPED | OUTPATIENT
Start: 2019-01-02 | End: 2019-05-12 | Stop reason: SDUPTHER

## 2019-01-02 RX ORDER — MAGNESIUM GLUCONATE 27 MG(500)
1000 TABLET ORAL 3 TIMES DAILY
Qty: 90 CAPSULE | Refills: 3 | Status: SHIPPED | OUTPATIENT
Start: 2019-01-02 | End: 2019-05-12 | Stop reason: SDUPTHER

## 2019-01-02 RX ORDER — MONTELUKAST SODIUM 10 MG/1
TABLET ORAL
Qty: 30 TABLET | Refills: 5 | Status: SHIPPED | OUTPATIENT
Start: 2019-01-02 | End: 2019-07-16 | Stop reason: SDUPTHER

## 2019-01-02 RX ORDER — BUSPIRONE HYDROCHLORIDE 5 MG/1
5 TABLET ORAL 3 TIMES DAILY PRN
Qty: 90 TABLET | Refills: 5 | Status: SHIPPED | OUTPATIENT
Start: 2019-01-02 | End: 2021-12-14 | Stop reason: SDUPTHER

## 2019-01-02 RX ORDER — TOPIRAMATE 25 MG/1
TABLET ORAL
Qty: 120 TABLET | Refills: 5 | Status: CANCELLED | OUTPATIENT
Start: 2019-01-02

## 2019-01-02 RX ORDER — SERTRALINE HYDROCHLORIDE 100 MG/1
150 TABLET, FILM COATED ORAL DAILY
Qty: 45 TABLET | Refills: 2 | Status: SHIPPED | OUTPATIENT
Start: 2019-01-02 | End: 2019-04-16 | Stop reason: SDUPTHER

## 2019-01-02 RX ORDER — FLUTICASONE PROPIONATE 50 MCG
SPRAY, SUSPENSION (ML) NASAL
Qty: 1 BOTTLE | Refills: 1 | Status: SHIPPED | OUTPATIENT
Start: 2019-01-02 | End: 2021-07-27 | Stop reason: SDUPTHER

## 2019-01-02 RX ORDER — AMPICILLIN TRIHYDRATE 250 MG
CAPSULE ORAL
Qty: 120 CAPSULE | Refills: 5 | Status: SHIPPED | OUTPATIENT
Start: 2019-01-02

## 2019-01-02 RX ORDER — LEVONORGESTREL AND ETHINYL ESTRADIOL 0.15-0.03
KIT ORAL
Qty: 28 TABLET | Refills: 0 | Status: SHIPPED | OUTPATIENT
Start: 2019-01-02 | End: 2019-03-20 | Stop reason: SDUPTHER

## 2019-01-02 ASSESSMENT — ENCOUNTER SYMPTOMS
VOMITING: 0
TROUBLE SWALLOWING: 0
SORE THROAT: 0
ABDOMINAL PAIN: 0
SHORTNESS OF BREATH: 0
CHEST TIGHTNESS: 0
SINUS PAIN: 1
RHINORRHEA: 1
DIARRHEA: 0
SINUS PRESSURE: 1
NAUSEA: 1
WHEEZING: 0
COUGH: 1

## 2019-01-02 ASSESSMENT — PATIENT HEALTH QUESTIONNAIRE - PHQ9
SUM OF ALL RESPONSES TO PHQ9 QUESTIONS 1 & 2: 0
1. LITTLE INTEREST OR PLEASURE IN DOING THINGS: 0
SUM OF ALL RESPONSES TO PHQ QUESTIONS 1-9: 0
SUM OF ALL RESPONSES TO PHQ QUESTIONS 1-9: 0
2. FEELING DOWN, DEPRESSED OR HOPELESS: 0

## 2019-01-24 RX ORDER — LEVONORGESTREL AND ETHINYL ESTRADIOL 0.15-0.03
KIT ORAL
Qty: 28 TABLET | Refills: 0 | Status: SHIPPED | OUTPATIENT
Start: 2019-01-24 | End: 2019-03-20 | Stop reason: SDUPTHER

## 2019-01-29 ENCOUNTER — HOSPITAL ENCOUNTER (OUTPATIENT)
Age: 29
Discharge: HOME | End: 2019-01-29
Payer: MEDICARE

## 2019-01-29 DIAGNOSIS — E66.01 OBESITY, CLASS III, BMI 40-49.9 (MORBID OBESITY) (HCC): ICD-10-CM

## 2019-01-29 DIAGNOSIS — Z00.00 ENCOUNTER FOR WELLNESS EXAMINATION: ICD-10-CM

## 2019-01-29 LAB
ALBUMIN SERPL-MCNC: 4.1 G/DL (ref 3.5–5.1)
ALP BLD-CCNC: 85 U/L (ref 38–126)
ALT SERPL-CCNC: 16 U/L (ref 11–66)
ANION GAP SERPL CALCULATED.3IONS-SCNC: 16 MEQ/L (ref 8–16)
AST SERPL-CCNC: 13 U/L (ref 5–40)
BASOPHILS # BLD: 0.2 %
BASOPHILS ABSOLUTE: 0 THOU/MM3 (ref 0–0.1)
BILIRUB SERPL-MCNC: 0.3 MG/DL (ref 0.3–1.2)
BUN BLDV-MCNC: 7 MG/DL (ref 7–22)
CALCIUM SERPL-MCNC: 9.2 MG/DL (ref 8.5–10.5)
CHLORIDE BLD-SCNC: 102 MEQ/L (ref 98–111)
CHOLESTEROL, TOTAL: 181 MG/DL (ref 100–199)
CO2: 24 MEQ/L (ref 23–33)
CREAT SERPL-MCNC: 0.5 MG/DL (ref 0.4–1.2)
EOSINOPHIL # BLD: 2.2 %
EOSINOPHILS ABSOLUTE: 0.2 THOU/MM3 (ref 0–0.4)
ERYTHROCYTE [DISTWIDTH] IN BLOOD BY AUTOMATED COUNT: 13.3 % (ref 11.5–14.5)
ERYTHROCYTE [DISTWIDTH] IN BLOOD BY AUTOMATED COUNT: 43.5 FL (ref 35–45)
GFR SERPL CREATININE-BSD FRML MDRD: > 90 ML/MIN/1.73M2
GLUCOSE BLD-MCNC: 89 MG/DL (ref 70–108)
HCT VFR BLD CALC: 44.4 % (ref 37–47)
HDLC SERPL-MCNC: 45 MG/DL
HEMOGLOBIN: 14.6 GM/DL (ref 12–16)
IMMATURE GRANS (ABS): 0.03 THOU/MM3 (ref 0–0.07)
IMMATURE GRANULOCYTES: 0.4 %
LDL CHOLESTEROL CALCULATED: 104 MG/DL
LYMPHOCYTES # BLD: 28.4 %
LYMPHOCYTES ABSOLUTE: 2.3 THOU/MM3 (ref 1–4.8)
MAGNESIUM: 2 MG/DL (ref 1.6–2.4)
MCH RBC QN AUTO: 29.6 PG (ref 26–33)
MCHC RBC AUTO-ENTMCNC: 32.9 GM/DL (ref 32.2–35.5)
MCV RBC AUTO: 89.9 FL (ref 81–99)
MONOCYTES # BLD: 6.8 %
MONOCYTES ABSOLUTE: 0.6 THOU/MM3 (ref 0.4–1.3)
NUCLEATED RED BLOOD CELLS: 0 /100 WBC
PLATELET # BLD: 339 THOU/MM3 (ref 130–400)
PMV BLD AUTO: 9.7 FL (ref 9.4–12.4)
POTASSIUM SERPL-SCNC: 4.5 MEQ/L (ref 3.5–5.2)
RBC # BLD: 4.94 MILL/MM3 (ref 4.2–5.4)
SEG NEUTROPHILS: 62 %
SEGMENTED NEUTROPHILS ABSOLUTE COUNT: 5 THOU/MM3 (ref 1.8–7.7)
SODIUM BLD-SCNC: 142 MEQ/L (ref 135–145)
TOTAL PROTEIN: 7 G/DL (ref 6.1–8)
TRIGL SERPL-MCNC: 161 MG/DL (ref 0–199)
TSH SERPL DL<=0.05 MIU/L-ACNC: 2.89 UIU/ML (ref 0.4–4.2)
VITAMIN D 25-HYDROXY: 24 NG/ML (ref 30–100)
WBC # BLD: 8.1 THOU/MM3 (ref 4.8–10.8)

## 2019-01-29 PROCEDURE — 85025 COMPLETE CBC W/AUTO DIFF WBC: CPT

## 2019-01-29 PROCEDURE — 36415 COLL VENOUS BLD VENIPUNCTURE: CPT

## 2019-01-29 PROCEDURE — 82306 VITAMIN D 25 HYDROXY: CPT

## 2019-01-29 PROCEDURE — 80053 COMPREHEN METABOLIC PANEL: CPT

## 2019-01-29 PROCEDURE — 80061 LIPID PANEL: CPT

## 2019-01-29 PROCEDURE — 84443 ASSAY THYROID STIM HORMONE: CPT

## 2019-01-29 PROCEDURE — 83735 ASSAY OF MAGNESIUM: CPT

## 2019-01-31 ENCOUNTER — TELEPHONE (OUTPATIENT)
Dept: FAMILY MEDICINE CLINIC | Age: 29
End: 2019-01-31

## 2019-02-21 RX ORDER — LEVONORGESTREL AND ETHINYL ESTRADIOL 0.15-0.03
KIT ORAL
Qty: 28 TABLET | Refills: 0 | Status: SHIPPED | OUTPATIENT
Start: 2019-02-21 | End: 2019-03-19 | Stop reason: SDUPTHER

## 2019-04-15 RX ORDER — LEVONORGESTREL AND ETHINYL ESTRADIOL 0.15-0.03
KIT ORAL
Qty: 28 TABLET | Refills: 0 | Status: CANCELLED | OUTPATIENT
Start: 2019-04-15

## 2019-04-15 NOTE — TELEPHONE ENCOUNTER
Last visit- 1/2/2019  Next visit- 4/16/2019    Requested Prescriptions     Pending Prescriptions Disp Refills    levonorgestrel-ethinyl estradiol (MARLISSA) 0.15-30 MG-MCG per tablet 28 tablet 0

## 2019-04-16 ENCOUNTER — OFFICE VISIT (OUTPATIENT)
Dept: FAMILY MEDICINE CLINIC | Age: 29
End: 2019-04-16
Payer: MEDICARE

## 2019-04-16 VITALS
HEART RATE: 80 BPM | TEMPERATURE: 97.9 F | WEIGHT: 293 LBS | BODY MASS INDEX: 45.99 KG/M2 | HEIGHT: 67 IN | OXYGEN SATURATION: 99 % | SYSTOLIC BLOOD PRESSURE: 133 MMHG | DIASTOLIC BLOOD PRESSURE: 79 MMHG | RESPIRATION RATE: 12 BRPM

## 2019-04-16 DIAGNOSIS — F41.9 ANXIETY: ICD-10-CM

## 2019-04-16 DIAGNOSIS — G43.821 MENSTRUAL MIGRAINE WITH STATUS MIGRAINOSUS, NOT INTRACTABLE: ICD-10-CM

## 2019-04-16 DIAGNOSIS — F40.01 PANIC DISORDER WITH AGORAPHOBIA: ICD-10-CM

## 2019-04-16 DIAGNOSIS — J01.01 ACUTE RECURRENT MAXILLARY SINUSITIS: ICD-10-CM

## 2019-04-16 DIAGNOSIS — N92.6 IRREGULAR MENSTRUATION: Primary | ICD-10-CM

## 2019-04-16 DIAGNOSIS — G43.109 MIGRAINE WITH AURA AND WITHOUT STATUS MIGRAINOSUS, NOT INTRACTABLE: ICD-10-CM

## 2019-04-16 PROCEDURE — 1036F TOBACCO NON-USER: CPT | Performed by: NURSE PRACTITIONER

## 2019-04-16 PROCEDURE — G8427 DOCREV CUR MEDS BY ELIG CLIN: HCPCS | Performed by: NURSE PRACTITIONER

## 2019-04-16 PROCEDURE — G8417 CALC BMI ABV UP PARAM F/U: HCPCS | Performed by: NURSE PRACTITIONER

## 2019-04-16 PROCEDURE — 99214 OFFICE O/P EST MOD 30 MIN: CPT | Performed by: NURSE PRACTITIONER

## 2019-04-16 RX ORDER — MONTELUKAST SODIUM 10 MG/1
TABLET ORAL
Qty: 30 TABLET | Refills: 5 | Status: CANCELLED | OUTPATIENT
Start: 2019-04-16

## 2019-04-16 RX ORDER — TOPIRAMATE 25 MG/1
TABLET ORAL
Qty: 120 TABLET | Refills: 5 | Status: SHIPPED | OUTPATIENT
Start: 2019-04-16 | End: 2019-10-05 | Stop reason: SDUPTHER

## 2019-04-16 RX ORDER — LEVONORGESTREL AND ETHINYL ESTRADIOL 0.15-0.03
KIT ORAL
Qty: 28 TABLET | Refills: 5 | Status: SHIPPED | OUTPATIENT
Start: 2019-04-16 | End: 2019-09-03 | Stop reason: SDUPTHER

## 2019-04-16 RX ORDER — SERTRALINE HYDROCHLORIDE 100 MG/1
150 TABLET, FILM COATED ORAL DAILY
Qty: 45 TABLET | Refills: 2 | Status: SHIPPED | OUTPATIENT
Start: 2019-04-16 | End: 2020-01-09

## 2019-04-16 ASSESSMENT — ENCOUNTER SYMPTOMS
ABDOMINAL DISTENTION: 0
SORE THROAT: 0
COLOR CHANGE: 0
ABDOMINAL PAIN: 0
RHINORRHEA: 0
DIARRHEA: 0
EYE REDNESS: 0
NAUSEA: 0
BLOOD IN STOOL: 0
ANAL BLEEDING: 0
SHORTNESS OF BREATH: 0
COUGH: 0
CONSTIPATION: 0
EYE DISCHARGE: 0

## 2019-04-16 NOTE — PATIENT INSTRUCTIONS
migraines: Aged cheese, wine, onions, carbohydrates, and chocolate  · Please start a headache diary:  · Write down when the headache starts, home treatment, and when the headache ends  · Write down what he has had to eat and drink that day and the day before  · Write down how many hours of sleep he had the night before  · Write down how many ounces of water he had that day and the day before. Will see you back as scheduled, sooner as needed      Patient Education           Anxiety Disorder: Care Instructions   Your Care Instructions      Anxiety is a normal reaction to stress. Difficult situations can cause you to have symptoms such as sweaty palms and a nervous feeling. In an anxiety disorder, the symptoms are far more severe. Constant worry, muscle tension, trouble sleeping, nausea and diarrhea, and other symptoms can make normal daily activities difficult or impossible. These symptoms may occur for no reason, and they can affect your work, school, or social life. Medicines, counseling, and self-care can all help. Follow-up care is a key part of your treatment and safety. Be sure to make and go to all appointments, and call your doctor if you are having problems. It's also a good idea to know your test results and keep a list of the medicines you take. How can you care for yourself at home? · Take medicines exactly as directed. Call your doctor if you think you are having a problem with your medicine. · Go to your counseling sessions and follow-up appointments. · Recognize and accept your anxiety. Then, when you are in a situation that makes you anxious, say to yourself, \"This is not an emergency. I feel uncomfortable, but I am not in danger. I can keep going even if I feel anxious. \"  · Be kind to your body:  ? Relieve tension with exercise or a massage. ? Get enough rest.  ? Avoid alcohol, caffeine, nicotine, and illegal drugs. They can increase your anxiety level and cause sleep problems. ?  Learn and do relaxation techniques. See below for more about these techniques. · Engage your mind. Get out and do something you enjoy. Go to a funny movie, or take a walk or hike. Plan your day. Having too much or too little to do can make you anxious. · Keep a record of your symptoms. Discuss your fears with a good friend or family member, or join a support group for people with similar problems. Talking to others sometimes relieves stress. · Get involved in social groups, or volunteer to help others. Being alone sometimes makes things seem worse than they are. · Get at least 30 minutes of exercise on most days of the week to relieve stress. Walking is a good choice. You also may want to do other activities, such as running, swimming, cycling, or playing tennis or team sports. Relaxation techniques  Do relaxation exercises 10 to 20 minutes a day. You can play soothing, relaxing music while you do them, if you wish. · Tell others in your house that you are going to do your relaxation exercises. Ask them not to disturb you. · Find a comfortable place, away from all distractions and noise. · Lie down on your back, or sit with your back straight. · Focus on your breathing. Make it slow and steady. · Breathe in through your nose. Breathe out through either your nose or mouth. · Breathe deeply, filling up the area between your navel and your rib cage. Breathe so that your belly goes up and down. · Do not hold your breath. · Breathe like this for 5 to 10 minutes. Notice the feeling of calmness throughout your whole body. As you continue to breathe slowly and deeply, relax by doing the following for another 5 to 10 minutes:  · Tighten and relax each muscle group in your body. You can begin at your toes and work your way up to your head. · Imagine your muscle groups relaxing and becoming heavy. · Empty your mind of all thoughts. · Let yourself relax more and more deeply.   · Become aware of the state of calmness that surrounds you. · When your relaxation time is over, you can bring yourself back to alertness by moving your fingers and toes and then your hands and feet and then stretching and moving your entire body. Sometimes people fall asleep during relaxation, but they usually wake up shortly afterward. · Always give yourself time to return to full alertness before you drive a car or do anything that might cause an accident if you are not fully alert. Never play a relaxation tape while you drive a car. When should you call for help? Call 911 anytime you think you may need emergency care. For example, call if:    · You feel you cannot stop from hurting yourself or someone else.   [place] the numbers for these national suicide hotlines: 0-370-085-TALK (2-636.318.9408) and 4-406-UCLSKVP (4-167.211.3401). If you or someone you know talks about suicide or feeling hopeless, get help right away.   Watch closely for changes in your health, and be sure to contact your doctor if:    · You have anxiety or fear that affects your life.     · You have symptoms of anxiety that are new or different from those you had before. Where can you learn more? Go to https://Foldax.Local Yokel Media. org and sign in to your [employer] account. Enter P754 in the [employer] (P) box to learn more about \"Anxiety Disorder: Care Instructions. \"     If you do not have an account, please click on the \"Sign Up Now\" link. Current as of: September 11, 2018  Content Version: 11.9  © 2727-6055 [employer]. Care instructions adapted under license by [facility] ([facility]). If you have questions about a medical condition or this instruction, always ask your healthcare professional. [place] [address] any warranty or liability for your use of this information.          Patient Education        Migraine Headache: Care Instructions  Your Care Instructions  Migraines are painful, throbbing headaches that often start on one side of the head. They may cause nausea and vomiting and make you sensitive to light, sound, or smell. Without treatment, migraines can last from 4 hours to a few days. Medicines can help prevent migraines or stop them after they have started. Your doctor can help you find which ones work best for you. Follow-up care is a key part of your treatment and safety. Be sure to make and go to all appointments, and call your doctor if you are having problems. It's also a good idea to know your test results and keep a list of the medicines you take. How can you care for yourself at home? · Do not drive if you have taken a prescription pain medicine. · Rest in a quiet, dark room until your headache is gone. Close your eyes, and try to relax or go to sleep. Don't watch TV or read. · Put a cold, moist cloth or cold pack on the painful area for 10 to 20 minutes at a time. Put a thin cloth between the cold pack and your skin. · Use a warm, moist towel or a heating pad set on low to relax tight shoulder and neck muscles. · Have someone gently massage your neck and shoulders. · Take your medicines exactly as prescribed. Call your doctor if you think you are having a problem with your medicine. You will get more details on the specific medicines your doctor prescribes. · Be careful not to take pain medicine more often than the instructions allow. You could get worse or more frequent headaches when the medicine wears off. To prevent migraines  · Keep a headache diary so you can figure out what triggers your headaches. Avoiding triggers may help you prevent headaches. Record when each headache began, how long it lasted, and what the pain was like. (Was it throbbing, aching, stabbing, or dull?) Write down any other symptoms you had with the headache, such as nausea, flashing lights or dark spots, or sensitivity to bright light or loud noise. Note if the headache occurred near your period.  List anything that might have triggered the headache. Triggers may include certain foods (chocolate, cheese, wine) or odors, smoke, bright light, stress, or lack of sleep. · If your doctor has prescribed medicine for your migraines, take it as directed. You may have medicine that you take only when you get a migraine and medicine that you take all the time to help prevent migraines. ? If your doctor has prescribed medicine for when you get a headache, take it at the first sign of a migraine, unless your doctor has given you other instructions. ? If your doctor has prescribed medicine to prevent migraines, take it exactly as prescribed. Call your doctor if you think you are having a problem with your medicine. · Find healthy ways to deal with stress. Migraines are most common during or right after stressful times. Take time to relax before and after you do something that has caused a migraine in the past.  · Try to keep your muscles relaxed by keeping good posture. Check your jaw, face, neck, and shoulder muscles for tension. Try to relax them. When you sit at a desk, change positions often. And make sure to stretch for 30 seconds each hour. · Get plenty of sleep and exercise. · Eat meals on a regular schedule. Avoid foods and drinks that often trigger migraines. These include chocolate, alcohol (especially red wine and port), aspartame, monosodium glutamate (MSG), and some additives found in foods (such as hot dogs, benavides, cold cuts, aged cheeses, and pickled foods). · Limit caffeine. Don't drink too much coffee, tea, or soda. But don't quit caffeine suddenly. That can also give you migraines. · Do not smoke or allow others to smoke around you. If you need help quitting, talk to your doctor about stop-smoking programs and medicines. These can increase your chances of quitting for good. · If you are taking birth control pills or hormone therapy, talk to your doctor about whether they are triggering your migraines. When should you call for help?   Call 911 anytime you think you may need emergency care. For example, call if:    · You have signs of a stroke. These may include:  ? Sudden numbness, paralysis, or weakness in your face, arm, or leg, especially on only one side of your body. ? Sudden vision changes. ? Sudden trouble speaking. ? Sudden confusion or trouble understanding simple statements. ? Sudden problems with walking or balance. ? A sudden, severe headache that is different from past headaches.    Call your doctor now or seek immediate medical care if:    · You have new or worse nausea and vomiting.     · You have a new or higher fever.     · Your headache gets much worse.    Watch closely for changes in your health, and be sure to contact your doctor if:    · You are not getting better after 2 days (48 hours). Where can you learn more? Go to https://FreeWheelpeappssavvyeb.TimeFree Innovations. org and sign in to your EveryScape account. Enter U360 in the OrdrIt box to learn more about \"Migraine Headache: Care Instructions. \"     If you do not have an account, please click on the \"Sign Up Now\" link. Current as of: Malathi 3, 2018  Content Version: 11.9  © 3813-6256 [employer]uora, Incorporated. Care instructions adapted under license by [facility] ([facility]). If you have questions about a medical condition or this instruction, always ask your healthcare professional. [address] any warranty or liability for your use of this information.

## 2019-04-16 NOTE — PROGRESS NOTES
[id number] [facility]. [address]  Dept: [phone]  Dept Fax: [phone] 35: [phone]    Visit Date: 2019    Buddy Tran is a 33 y.o. female who presents today for:  Chief Complaint   Patient presents with    Follow-up    Hypertension     HPI:     Started back on her diet - Whole 30 - lost 10 pounds doing that with no exercise - has recently started up with exercise - mostly walking - 3 times per week for about 2 miles, about 40 minutes. Headaches have been good - taking Imitrex about 2 times per month - only one dose. Topamax daily    Still taking Zoloft and Buspar - zoloft daily and buspar only a couple times per week. Usually only takes it when [place] group of people. Taking Evelyn Oiler for the purpose of period regulation and for headaches during your cycles.      Not on any BP medications    HPI  Health Maintenance   Topic Date Due    Varicella Vaccine (1 of 2 - 13+ 2-dose series) 2002    DTaP/Tdap/Td vaccine (1 - Tdap) 2008    Cervical cancer screen  2018    Flu vaccine (Season Ended) 2019    HIV screen  Completed    Pneumococcal 0-64 years Vaccine  Aged Out       Past Medical History:   Diagnosis Date    Anxiety 2016    Postpartum depression       Past Surgical History:   Procedure Laterality Date    WISDOM TOOTH EXTRACTION       Family History   Problem Relation Age of Onset   [de-identified] / [place] Mother     High Blood Pressure Father     High Blood Pressure Brother     Diabetes Maternal Grandmother      Social History     Tobacco Use    Smoking status: Former Smoker     Last attempt to quit: 2006     Years since quittin.9    Smokeless tobacco: Never Used   Substance Use Topics    Alcohol use: No      Current Outpatient Medications   Medication Sig Dispense Refill    topiramate (TOPAMAX) 25 MG tablet take 1 tablet once daily at bedtime - MAY INCREASE TO 2 TABLETS TWICE A  tablet 5    sertraline (ZOLOFT) 100 MG tablet Take 1.5 tablets by mouth daily 45 tablet 2    levonorgestrel-ethinyl estradiol (MARLISSA) 0.15-30 MG-MCG per tablet take 1 tablet once daily 28 tablet 5    SUMAtriptan (IMITREX) 50 MG tablet Take 1 tablet by mouth once as needed for Migraine 9 tablet 3    busPIRone (BUSPAR) 5 MG tablet Take 1 tablet by mouth 3 times daily as needed (anxiety) 90 tablet 5    Cholecalciferol (VITAMIN D3) 5000 units TABS One tablet daily 30 tablet 5    Cinnamon 500 MG CAPS Take four times daily before meals and at bedtime 120 capsule 5    fluticasone (FLONASE) 50 MCG/ACT nasal spray instill 1 spray into each nostril once daily 1 Bottle 1    montelukast (SINGULAIR) 10 MG tablet take 1 tablet by mouth once daily 30 tablet 5    Omega-3 Fatty Acids (OMEGA III EPA+DHA) 1000 MG CAPS Take 1,000 mg by mouth 3 times daily 90 capsule 3    Vitamins-Lipotropics (BALANCED B-100 COMPLEX CR) TBCR Take 1 tablet by mouth 4 times daily (after meals and at bedtime) 120 tablet 5    Phenylephrine-DM-GG (MUCINEX FAST-MAX CONGEST COUGH) 5- MG TABS Take by mouth      Saline 0.2 % SOLN by Nasal route      guaiFENesin (MUCINEX) 600 MG extended release tablet Take 1 tablet by mouth 2 times daily 20 tablet 1    Blood Pressure Monitor KIT Take blood pressure daily 1 kit 0    Elastic Bandages & Supports (B & B ELASTIC ANKLE BRACE) MISC Wear brace when up walking during the day 1 each 0     No current facility-administered medications for this visit. Allergies   Allergen Reactions    Latex Rash     And itching    Adhesive Tape Rash       Subjective:    Review of Systems   Constitutional: Negative for chills, fatigue and fever. HENT: Negative for congestion, ear pain, postnasal drip, rhinorrhea and sore throat. Eyes: Negative for discharge and redness. Respiratory: Negative for cough and shortness of breath.     Cardiovascular: Negative for chest pain and leg swelling. Gastrointestinal: Negative for abdominal distention, abdominal pain, anal bleeding, blood in stool, constipation, diarrhea and nausea. Skin: Negative for color change and rash. Neurological: Negative for facial asymmetry, speech difficulty and weakness. Hematological: Does not bruise/bleed easily. Psychiatric/Behavioral: Negative for agitation and confusion. Objective:      Vitals:    04/16/19 0836   BP: 133/79   Pulse: 80   Resp: 12   Temp: 97.9 °F (36.6 °C)   TempSrc: Oral   SpO2: 99%   Weight: 297 lb 12.8 oz (135.1 kg)   Height: 5' 7.32\" (1.71 m)       Body mass index is 46.2 kg/m². Wt Readings from Last 3 Encounters:   04/16/19 297 lb 12.8 oz (135.1 kg)   01/02/19 (!) 310 lb 6.4 oz (140.8 kg)   12/31/18 250 lb (113.4 kg)     BP Readings from Last 3 Encounters:   04/16/19 133/79   01/02/19 124/78   12/31/18 (!) 148/75       Physical Exam   Constitutional: She is oriented to person, place, and time. She appears well-developed and well-nourished. No distress. HENT:   Head: Normocephalic and atraumatic. Right Ear: Hearing and external ear normal. No swelling. Left Ear: Hearing and external ear normal. No swelling. Nose: No mucosal edema or rhinorrhea. Right sinus exhibits no maxillary sinus tenderness and no frontal sinus tenderness. Left sinus exhibits no maxillary sinus tenderness and no frontal sinus tenderness. Mouth/Throat: Oropharynx is clear and moist. No oropharyngeal exudate or posterior oropharyngeal erythema. Eyes: Pupils are equal, round, and reactive to light. Conjunctivae are normal. Right eye exhibits no discharge. Left eye exhibits no discharge. Neck: Normal range of motion. Cardiovascular: Normal rate and regular rhythm. No lower extremity edema   Pulmonary/Chest: Effort normal and breath sounds normal. No respiratory distress. She has no wheezes. Musculoskeletal: She exhibits no tenderness or deformity.    Full ROM of upper and lower extremities    Lymphadenopathy:     She has no cervical adenopathy. Neurological: She is alert and oriented to person, place, and time. Coordination and gait normal.   Skin: Skin is warm and dry. No rash noted. She is not diaphoretic. No cyanosis. Nails show no clubbing. Psychiatric: She has a normal mood and affect. Her speech is normal and behavior is normal. Judgment and thought content normal. Cognition and memory are normal.       Lab Results   Component Value Date    WBC 8.1 01/29/2019    HGB 14.6 01/29/2019    HCT 44.4 01/29/2019     01/29/2019    CHOL 181 01/29/2019    TRIG 161 01/29/2019    HDL 45 01/29/2019    LDLCALC 104 01/29/2019    AST 13 01/29/2019     01/29/2019    K 4.5 01/29/2019     01/29/2019    CREATININE 0.5 01/29/2019    BUN 7 01/29/2019    CO2 24 01/29/2019    TSH 2.890 01/29/2019    LABA1C 5.3 09/22/2016    LABGLOM >90 01/29/2019    MG 2.0 01/29/2019    CALCIUM 9.2 01/29/2019    VITD25 24 (L) 01/29/2019       Assessment:       Diagnosis Orders   1. Irregular menstruation  levonorgestrel-ethinyl estradiol (MARLISSA) 0.15-30 MG-MCG per tablet   2. Migraine with aura and without status migrainosus, not intractable  topiramate (TOPAMAX) 25 MG tablet   3. Panic disorder with agoraphobia  sertraline (ZOLOFT) 100 MG tablet   4. Anxiety  sertraline (ZOLOFT) 100 MG tablet   5. Acute recurrent maxillary sinusitis     6. Menstrual migraine with status migrainosus, not intractable  levonorgestrel-ethinyl estradiol (MARLISSA) 0.15-30 MG-MCG per tablet       Plan:    Will refill the medications today  · Avoid triggers:  · Lack of food - Aim for 3 health meals daily, with healthy snacks in between  · Lack of water - Aim for half of your body weight in ounces daily  · Lack of sleep - Aim for 6-8 hours nightly  · Certain foods that trigger migraines: Aged cheese, wine, onions, carbohydrates, and chocolate  · Please start a headache diary:  · Write down when the headache starts, home treatment, and when the headache ends  · Write down what he has had to eat and drink that day and the day before  · Write down how many hours of sleep he had the night before  · Write down how many ounces of water he had that day and the day before. Will see you back as scheduled, sooner as needed    Return in about 3 months (around 7/16/2019), or if symptoms worsen or fail to improve. Orders Placed:  No orders of the defined types were placed in this encounter. Medications Prescribed:  Orders Placed This Encounter   Medications    topiramate (TOPAMAX) 25 MG tablet     Sig: take 1 tablet once daily at bedtime - MAY INCREASE TO 2 TABLETS TWICE A DAY     Dispense:  120 tablet     Refill:  5    sertraline (ZOLOFT) 100 MG tablet     Sig: Take 1.5 tablets by mouth daily     Dispense:  45 tablet     Refill:  2    levonorgestrel-ethinyl estradiol (MARLISSA) 0.15-30 MG-MCG per tablet     Sig: take 1 tablet once daily     Dispense:  28 tablet     Refill:  5       Future Appointments   Date Time Provider Jennifer Palacio   1/6/2020  8:30 AM Alyse OhsDO SRPX  [facility] [address] [facility]        Patient given educational materials - see patient instructions. Discussed use, benefit, and side effects of prescribedmedications. All patient questions answered. Pt voiced understanding. Reviewed health maintenance. Instructed to continue current medications, diet and exercise. Patient agreed with treatment plan. Follow up as directed.     Electronically signed by EARL Recio CNP on 4/16/2019 at 9:38 AM

## 2019-05-12 DIAGNOSIS — G43.109 MIGRAINE WITH AURA AND WITHOUT STATUS MIGRAINOSUS, NOT INTRACTABLE: ICD-10-CM

## 2019-05-12 DIAGNOSIS — E66.01 OBESITY, CLASS III, BMI 40-49.9 (MORBID OBESITY) (HCC): ICD-10-CM

## 2019-05-13 ENCOUNTER — TELEPHONE (OUTPATIENT)
Dept: FAMILY MEDICINE CLINIC | Age: 29
End: 2019-05-13

## 2019-05-13 RX ORDER — FLUCONAZOLE 100 MG/1
100 TABLET ORAL DAILY
Qty: 3 TABLET | Refills: 0 | Status: SHIPPED | OUTPATIENT
Start: 2019-05-13 | End: 2019-09-05 | Stop reason: SDUPTHER

## 2019-05-13 RX ORDER — SUMATRIPTAN 50 MG/1
TABLET, FILM COATED ORAL
Qty: 9 TABLET | Refills: 5 | Status: SHIPPED | OUTPATIENT
Start: 2019-05-13 | End: 2019-11-03 | Stop reason: SDUPTHER

## 2019-05-13 RX ORDER — OMEGA-3 FATTY ACIDS/FISH OIL 300-500 MG
CAPSULE ORAL
Qty: 90 CAPSULE | Refills: 5 | Status: SHIPPED | OUTPATIENT
Start: 2019-05-13 | End: 2021-09-04 | Stop reason: SDUPTHER

## 2019-05-13 NOTE — TELEPHONE ENCOUNTER
Last visit- 4/16/2019  Next visit- 1/6/2020    Requested Prescriptions     Pending Prescriptions Disp Refills    Omega-3 Fatty Acids (RA FISH OIL) 1000 MG CAPS [Pharmacy Med Name: RA FISH OIL 1,000 MG SOFTGEL] 90 capsule 3     Sig: take 1 capsule by mouth three times a day    SUMAtriptan (IMITREX) 50 MG tablet [Pharmacy Med Name: SUMATRIPTAN SUCC 50 MG TABLET] 9 tablet 3     Sig: TAKE 1 TABLET ONCE AS NEEDED FOR MIGRAINE

## 2019-05-13 NOTE — TELEPHONE ENCOUNTER
Pt has had a yeast infection for the last few days and would like something called in. Please advise.

## 2019-05-20 ENCOUNTER — TELEPHONE (OUTPATIENT)
Dept: FAMILY MEDICINE CLINIC | Age: 29
End: 2019-05-20

## 2019-05-21 ENCOUNTER — PROCEDURE VISIT (OUTPATIENT)
Dept: FAMILY MEDICINE CLINIC | Age: 29
End: 2019-05-21
Payer: MEDICARE

## 2019-05-21 VITALS
OXYGEN SATURATION: 96 % | BODY MASS INDEX: 45.52 KG/M2 | TEMPERATURE: 98.5 F | RESPIRATION RATE: 12 BRPM | WEIGHT: 290 LBS | SYSTOLIC BLOOD PRESSURE: 118 MMHG | HEART RATE: 90 BPM | DIASTOLIC BLOOD PRESSURE: 80 MMHG | HEIGHT: 67 IN

## 2019-05-21 DIAGNOSIS — Z01.419 WOMEN'S ANNUAL ROUTINE GYNECOLOGICAL EXAMINATION: ICD-10-CM

## 2019-05-21 DIAGNOSIS — B37.31 VAGINAL YEAST INFECTION: ICD-10-CM

## 2019-05-21 DIAGNOSIS — Z23 NEED FOR TETANUS BOOSTER: ICD-10-CM

## 2019-05-21 DIAGNOSIS — Z28.39 INCOMPLETE IMMUNIZATION STATUS: ICD-10-CM

## 2019-05-21 DIAGNOSIS — Z23 NEED FOR PROPHYLACTIC VACCINATION AGAINST DIPHTHERIA-TETANUS-PERTUSSIS (DTP): Primary | ICD-10-CM

## 2019-05-21 DIAGNOSIS — J06.9 VIRAL URI: ICD-10-CM

## 2019-05-21 LAB
CHLAMYDIA TRACHOMATIS BY RT-PCR: NOT DETECTED
CT/NG SOURCE: NORMAL
NEISSERIA GONORRHOEAE BY RT-PCR: NOT DETECTED

## 2019-05-21 PROCEDURE — G8427 DOCREV CUR MEDS BY ELIG CLIN: HCPCS | Performed by: NURSE PRACTITIONER

## 2019-05-21 PROCEDURE — 1036F TOBACCO NON-USER: CPT | Performed by: NURSE PRACTITIONER

## 2019-05-21 PROCEDURE — G8417 CALC BMI ABV UP PARAM F/U: HCPCS | Performed by: NURSE PRACTITIONER

## 2019-05-21 PROCEDURE — 99214 OFFICE O/P EST MOD 30 MIN: CPT | Performed by: NURSE PRACTITIONER

## 2019-05-21 RX ORDER — FLUCONAZOLE 150 MG/1
TABLET ORAL
Qty: 3 TABLET | Refills: 0 | Status: SHIPPED | OUTPATIENT
Start: 2019-05-21 | End: 2019-09-05 | Stop reason: SDUPTHER

## 2019-05-21 RX ORDER — LORATADINE 10 MG/1
10 TABLET ORAL DAILY
Qty: 30 TABLET | Refills: 0 | Status: SHIPPED | OUTPATIENT
Start: 2019-05-21 | End: 2021-07-27 | Stop reason: SDUPTHER

## 2019-05-21 ASSESSMENT — ENCOUNTER SYMPTOMS
VOMITING: 0
CHEST TIGHTNESS: 0
TROUBLE SWALLOWING: 0
SINUS PAIN: 0
EYE ITCHING: 0
SINUS PRESSURE: 0
SHORTNESS OF BREATH: 0
WHEEZING: 0
SORE THROAT: 1
DIARRHEA: 0
EYE REDNESS: 0
RHINORRHEA: 0
NAUSEA: 0
ABDOMINAL PAIN: 0
COUGH: 1
EYE DISCHARGE: 0
CONSTIPATION: 0

## 2019-05-21 NOTE — PATIENT INSTRUCTIONS
Thank you   1. Thank you for trusting us with your healthcare needs. You may receive a survey regarding today's visit. It would help us out if you would take a few moments to provide your feedback. We value your input. 2. Please bring in ALL medications BOTTLES, including inhalers, herbal supplements, over the counter, prescribed & non-prescribed medicine. The office would like actual medication bottles and a list.   3. Please note our OFFICE POLICIES:   a. Prior to getting your labs drawn, please check with your insurance company for benefits and eligibility of lab services. Often, insurance companies cover certain tests for preventative visits only. It is patient's responsibility to see what is covered. b. We are unable to change a diagnosis after the test has been performed. c. Lab orders will not be re-printed. Please hold onto your original lab orders and take them to your lab to be completed. d. If you no show your scheduled appointment three times, you will be dismissed from this practice. e. Adelso Della must be completed 24 hours prior to your schedule appointment. 4. If the list below has been completed, PLEASE FAX RECORDS TO OUR OFFICE @ [phone]. Once the records have been received we will update your records at our office:  Health Maintenance Due   Topic Date Due    Varicella Vaccine (1 of 2 - 13+ 2-dose series) 07/16/2002    DTaP/Tdap/Td vaccine (1 - Tdap) 07/16/2008    Cervical cancer screen  05/26/2018     · Will do the PAP today  · Yeast test today  · Will extend the Diflucan  · No intercourse until symptoms have resolved  · Source of symptoms likely viral  · Instructed most viral illnesses last 7-14 days, and antibiotics do not help.   · Will give the Flonase - use once daily  · Get plenty of rest  · Increase fluids  · Avoid secondhand smoke  · Can use the Alexa Pot to rinse the sinuses as needed  · Cool-mist humidifier at night   · Use Tylenol or Ibuprofen (motrin) OTC as directed for fever  · Robitussin liquid for the congestion, take with lots of water  · OTC decongestant for 3-4 days to relieve congestion  · Return to office  if symptoms do not start to improve over the 7-10 days            Patient Education        Candidiasis: Care Instructions  Your Care Instructions  Candidiasis (say \"lmv-xyb-RK-uh-ekaterina\") is a yeast infection. Yeast normally lives in your body. But it can cause problems if your body's defenses don't work as they should. Some medicines can increase your chance of getting a yeast infection. These include antibiotics, steroids, and cancer drugs. And some diseases like AIDS and diabetes can make you more likely to get yeast infections. There are different types of yeast infections. Misha Meza is a yeast infection in the mouth. It usually occurs in people with weak immune systems. It causes white patches inside the mouth and throat. Yeast infections of the skin usually occur in skin folds where the skin stays moist. They cause red, oozing patches on your skin. Babies can get these infections under the diaper. People who often wear gloves can get them on their hands. Many women get vaginal yeast infections. They are most common when women take antibiotics. These infections can cause the vagina to itch and burn. They also cause white discharge that looks like cottage cheese. In rare cases, yeast infects the blood. This can cause serious disease. This kind of infection is treated with medicine given through a needle into a vein (IV). After you start treatment, a yeast infection usually goes away quickly. But if your immune system is weak, the infection may come back. Tell your doctor if you get yeast infections often. Follow-up care is a key part of your treatment and safety. Be sure to make and go to all appointments, and call your doctor if you are having problems. It's also a good idea to know your test results and keep a list of the medicines you take.   How can you care for yourself at home? · Take your medicines exactly as prescribed. Call your doctor if you think you are having a problem with your medicine. · Use antibiotics only as directed by your doctor. · Eat yogurt with live cultures. It has bacteria called lactobacillus. It may help prevent some types of yeast infections. · Keep your skin clean and dry. Put powder on moist places. · If you are using a cream or suppository to treat a vaginal yeast infection, don't use condoms or a diaphragm. Use a different type of birth control. · Eat a healthy diet and get regular exercise. This will help keep your immune system strong. When should you call for help? Watch closely for changes in your health, and be sure to contact your doctor if:    · You do not get better as expected. Where can you learn more? Go to https://chpepiceweb.Fantastic.cl. org and sign in to your D[employer] account. Enter U615 in the [employer]unch[employer] box to learn more about \"Candidiasis: Care Instructions. \"     If you do not have an account, please click on the \"Sign Up Now\" link. Current as of: May 14, 2018  Content Version: 12.0  © 8756-6077 [employer]. Care instructions adapted under license by [facility] ([facility]). If you have questions about a medical condition or this instruction, always ask your healthcare professional. [address] any warranty or liability for your use of this information. Patient Education        Viral Respiratory Infection: Care Instructions  Your Care Instructions    Viruses are very small organisms. They grow in number after they enter your body. There are many types that cause different illnesses, such as colds and the mumps. The symptoms of a viral respiratory infection often start quickly. They include a fever, sore throat, and runny nose. You may also just not feel well. Or you may not want to eat much. Most viral respiratory infections are not serious.  They usually get better with time and self-care. Antibiotics are not used to treat a viral infection. That's because antibiotics will not help cure a viral illness. In some cases, antiviral medicine can help your body fight a serious viral infection. Follow-up care is a key part of your treatment and safety. Be sure to make and go to all appointments, and call your doctor if you are having problems. It's also a good idea to know your test results and keep a list of the medicines you take. How can you care for yourself at home? · Rest as much as possible until you feel better. · Be safe with medicines. Take your medicine exactly as prescribed. Call your doctor if you think you are having a problem with your medicine. You will get more details on the specific medicine your doctor prescribes. · Take an over-the-counter pain medicine, such as acetaminophen (Tylenol), ibuprofen (Advil, Motrin), or naproxen (Aleve), as needed for pain and fever. Read and follow all instructions on the label. Do not give aspirin to anyone younger than 20. It has been linked to Reye syndrome, a serious illness. · Drink plenty of fluids, enough so that your urine is light yellow or clear like water. Hot fluids, such as tea or soup, may help relieve congestion in your nose and throat. If you have kidney, heart, or liver disease and have to limit fluids, talk with your doctor before you increase the amount of fluids you drink. · Try to clear mucus from your lungs by breathing deeply and coughing. · Gargle with warm salt water once an hour. This can help reduce swelling and throat pain. Use 1 teaspoon of salt mixed in 1 cup of warm water. · Do not smoke or allow others to smoke around you. If you need help quitting, talk to your doctor about stop-smoking programs and medicines. These can increase your chances of quitting for good. To avoid spreading the virus  · Cough or sneeze into a tissue. Then throw the tissue away.   · If you don't have a tissue, yeast infection if you are pregnant, have diabetes, douche, or wear tight clothes. With treatment, most yeast infections get better in 2 to 3 days. Follow-up care is a key part of your treatment and safety. Be sure to make and go to all appointments, and call your doctor if you are having problems. It's also a good idea to know your test results and keep a list of the medicines you take. How can you care for yourself at home? · Take your medicines exactly as prescribed. Call your doctor if you think you are having a problem with your medicine. · Ask your doctor about over-the-counter (OTC) medicines for yeast infections. They may cost less than prescription medicines. If you use an OTC treatment, read and follow all instructions on the label. · Do not use tampons while using a vaginal cream or suppository. The tampons can absorb the medicine. Use pads instead. · Wear loose cotton clothing. Do not wear nylon or other fabric that holds body heat and moisture close to the skin. · Try sleeping without underwear. · Do not scratch. Relieve itching with a cold pack or a cool bath. · Do not wash your vaginal area more than once a day. Use plain water or a mild, unscented soap. Air-dry the vaginal area. · Change out of wet swimsuits after swimming. · Do not have sex until you have finished your treatment. · Do not douche. When should you call for help? Call your doctor now or seek immediate medical care if:    · You have unexpected vaginal bleeding.     · You have new or increased pain in your vagina or pelvis.    Watch closely for changes in your health, and be sure to contact your doctor if:    · You have a fever.     · You are not getting better after 2 days.     · Your symptoms come back after you finish your medicines. Where can you learn more? Go to https://chpearacelyeweb.health-partners. org and sign in to your [employer] account.  Enter S289 in the [employer] box to learn more about \"Vaginal

## 2019-05-21 NOTE — PROGRESS NOTES
sertraline (ZOLOFT) 100 MG tablet Take 1.5 tablets by mouth daily 45 tablet 2    levonorgestrel-ethinyl estradiol (MARLISSA) 0.15-30 MG-MCG per tablet take 1 tablet once daily 28 tablet 5    busPIRone (BUSPAR) 5 MG tablet Take 1 tablet by mouth 3 times daily as needed (anxiety) 90 tablet 5    Cholecalciferol (VITAMIN D3) 5000 units TABS One tablet daily 30 tablet 5    Cinnamon 500 MG CAPS Take four times daily before meals and at bedtime 120 capsule 5    fluticasone (FLONASE) 50 MCG/ACT nasal spray instill 1 spray into each nostril once daily 1 Bottle 1    montelukast (SINGULAIR) 10 MG tablet take 1 tablet by mouth once daily 30 tablet 5    Vitamins-Lipotropics (BALANCED B-100 COMPLEX CR) TBCR Take 1 tablet by mouth 4 times daily (after meals and at bedtime) 120 tablet 5    Saline 0.2 % SOLN by Nasal route      Blood Pressure Monitor KIT Take blood pressure daily 1 kit 0    Elastic Bandages & Supports (B & B ELASTIC ANKLE BRACE) MISC Wear brace when up walking during the day 1 each 0    SUMAtriptan (IMITREX) 50 MG tablet TAKE 1 TABLET ONCE AS NEEDED FOR MIGRAINE 9 tablet 5    Phenylephrine-DM-GG (MUCINEX FAST-MAX CONGEST COUGH) 5- MG TABS Take by mouth      guaiFENesin (MUCINEX) 600 MG extended release tablet Take 1 tablet by mouth 2 times daily 20 tablet 1     No current facility-administered medications for this visit. Allergies   Allergen Reactions    Latex Rash     And itching    Adhesive Tape Rash       Health Maintenance   Topic Date Due    Varicella Vaccine (1 of 2 - 13+ 2-dose series) 07/16/2002    DTaP/Tdap/Td vaccine (1 - Tdap) 07/16/2008    Cervical cancer screen  05/26/2018    Flu vaccine (Season Ended) 09/01/2019    HIV screen  Completed    Pneumococcal 0-64 years Vaccine  Aged Out       Subjective:      Review of Systems   Constitutional: Negative for chills and fever. HENT: Positive for congestion, postnasal drip, sneezing and sore throat.  Negative for ear pain, rhinorrhea, sinus pressure, sinus pain and trouble swallowing. Eyes: Negative for discharge, redness and itching. Respiratory: Positive for cough. Negative for chest tightness, shortness of breath and wheezing. Gastrointestinal: Negative for abdominal pain, constipation, diarrhea, nausea and vomiting. Genitourinary: Positive for dysuria and vaginal discharge (thick white). Negative for frequency, genital sores, urgency and vaginal bleeding. Allergic/Immunologic: Positive for environmental allergies (seasonal). Neurological: Negative for headaches. Objective:     Vitals:    05/21/19 0843   BP: 118/80   Pulse: 90   Resp: 12   Temp: 98.5 °F (36.9 °C)   TempSrc: Oral   SpO2: 96%   Weight: 290 lb (131.5 kg)   Height: 5' 7.32\" (1.71 m)       Body mass index is 44.99 kg/m². Wt Readings from Last 3 Encounters:   05/21/19 290 lb (131.5 kg)   04/16/19 297 lb 12.8 oz (135.1 kg)   01/02/19 (!) 310 lb 6.4 oz (140.8 kg)     BP Readings from Last 3 Encounters:   05/21/19 118/80   04/16/19 133/79   01/02/19 124/78       Physical Exam   Constitutional: She is oriented to person, place, and time. Vital signs are normal. She appears well-developed and well-nourished. She does not appear ill. No distress. HENT:   Head: Normocephalic and atraumatic. Right Ear: Hearing, tympanic membrane, external ear and ear canal normal. No drainage, swelling or tenderness. No decreased hearing is noted. Left Ear: Hearing, tympanic membrane, external ear and ear canal normal. No drainage, swelling or tenderness. No decreased hearing is noted. Nose: Nose normal. No mucosal edema. Mouth/Throat: Uvula is midline, oropharynx is clear and moist and mucous membranes are normal. No uvula swelling. No oropharyngeal exudate or posterior oropharyngeal edema. Eyes: Pupils are equal, round, and reactive to light. Conjunctivae and EOM are normal. Right eye exhibits no discharge. Left eye exhibits no discharge. No scleral icterus. Neck: Normal range of motion. No thyromegaly present. Cardiovascular: Normal rate, regular rhythm and normal heart sounds. No murmur heard. Pulmonary/Chest: Effort normal and breath sounds normal. No accessory muscle usage. No respiratory distress. She has no decreased breath sounds. She has no wheezes. She has no rhonchi. She has no rales. Abdominal: Soft. Bowel sounds are normal. She exhibits no distension. There is no hepatosplenomegaly. There is no tenderness. There is no CVA tenderness. Genitourinary: Uterus normal. There is no rash on the right labia. There is no rash on the left labia. Cervix exhibits no motion tenderness and no friability. There is erythema and tenderness in the vagina. No foreign body in the vagina. No signs of injury around the vagina. No vaginal discharge found. Musculoskeletal: Normal range of motion. She exhibits no edema or tenderness. Lymphadenopathy:        Head (right side): No submental, no submandibular, no tonsillar, no preauricular and no posterior auricular adenopathy present. Head (left side): No submental, no submandibular, no tonsillar, no preauricular and no posterior auricular adenopathy present. She has no cervical adenopathy. Right: No supraclavicular adenopathy present. Left: No supraclavicular adenopathy present. Neurological: She is alert and oriented to person, place, and time. No cranial nerve deficit. Skin: Skin is warm and dry. No rash noted. She is not diaphoretic. No erythema. Psychiatric: She has a normal mood and affect. Her behavior is normal. Judgment and thought content normal. Cognition and memory are normal.   Nursing note and vitals reviewed.     Lab Results   Component Value Date    WBC 8.1 01/29/2019    HGB 14.6 01/29/2019    HCT 44.4 01/29/2019     01/29/2019    CHOL 181 01/29/2019    TRIG 161 01/29/2019    HDL 45 01/29/2019    LDLCALC 104 01/29/2019    AST 13 01/29/2019     01/29/2019    K 4.5 01/29/2019     01/29/2019    CREATININE 0.5 01/29/2019    BUN 7 01/29/2019    CO2 24 01/29/2019    TSH 2.890 01/29/2019    LABA1C 5.3 09/22/2016    LABGLOM >90 01/29/2019    MG 2.0 01/29/2019    CALCIUM 9.2 01/29/2019    VITD25 24 (L) 01/29/2019     Imaging Results:    No results found. Assessment:      Diagnosis Orders   1. Need for prophylactic vaccination against diphtheria-tetanus-pertussis (DTP)     2. Viral URI  loratadine (CLARITIN) 10 MG tablet    guaiFENesin (ROBITUSSIN) 100 MG/5ML liquid   3. Incomplete immunization status  Varicella Zoster Antibody, IgG    Varicella Zoster Antibody, IgM   4. Need for tetanus booster     5. Women's annual routine gynecological examination  PAP SMEAR    Culture, Genital    C. Trachomatis / N. Gonorrhoeae, DNA    Culture, Genital   6. Vaginal yeast infection  fluconazole (DIFLUCAN) 150 MG tablet     Plan:         · Will do the PAP today  · Yeast test today  · Will extend the Diflucan  · No intercourse until symptoms have resolved  · Source of symptoms likely viral  · Instructed most viral illnesses last 7-14 days, and antibiotics do not help. · Will give the Flonase - use once daily  · Get plenty of rest  · Increase fluids  · Avoid secondhand smoke  · Can use the [place] to rinse the sinuses as needed  · Cool-mist humidifier at night   · Use Tylenol or Ibuprofen (motrin) OTC as directed for fever  · Robitussin liquid for the congestion, take with lots of water  · OTC decongestant for 3-4 days to relieve congestion  · Return to office  if symptoms do not start to improve over the 7-10 days       Return if symptoms worsen or fail to improve. Orders Placed:  Orders Placed This Encounter   Procedures    Culture, Genital    C. Trachomatis / N.  Gonorrhoeae, DNA    Varicella Zoster Antibody, IgG    Varicella Zoster Antibody, IgM    PAP SMEAR     Medications Prescribed:  Orders Placed This Encounter   Medications    loratadine (CLARITIN) 10 MG tablet     Sig: Take 1 tablet by mouth daily     Dispense:  30 tablet     Refill:  0    guaiFENesin (ROBITUSSIN) 100 MG/5ML liquid     Sig: Take 10 mLs by mouth 3 times daily as needed for Cough     Dispense:  1 Bottle     Refill:  0    fluconazole (DIFLUCAN) 150 MG tablet     Sig: Take one tablet every 72 hours for 3 doses     Dispense:  3 tablet     Refill:  0       Future Appointments   Date Time Provider [facility]   1/6/2020  8:30 AM Octavio Pacheco DO [facility] [address] [facility]       Patient given educational materials - see patient instructions. Discussed use, benefit, and sideeffects of prescribed medications. All patient questions answered. Pt voiced understanding. Reviewed health maintenance. Instructed to continue current medications, diet and exercise. Patient agreed with treatment plan. Follow up as directed.      Electronically signed by EARL Toscano CNP on 5/21/2019 at 1:24 PM

## 2019-05-21 NOTE — PROGRESS NOTES
Health Maintenance Due   Topic Date Due    Varicella Vaccine (1 of 2 - 13+ 2-dose series) pended    DTaP/Tdap/Td vaccine (1 - Tdap) pended    Cervical cancer screen  today

## 2019-05-22 ENCOUNTER — TELEPHONE (OUTPATIENT)
Dept: FAMILY MEDICINE CLINIC | Age: 29
End: 2019-05-22

## 2019-05-22 NOTE — TELEPHONE ENCOUNTER
----- Message from EARL Tripathi CNP sent at 5/21/2019  4:27 PM EDT -----  Chlamydia gonorrhea both negative

## 2019-05-24 LAB
GENITAL CULTURE, ROUTINE: NORMAL
GRAM STAIN RESULT: NORMAL

## 2019-05-29 ENCOUNTER — TELEPHONE (OUTPATIENT)
Dept: FAMILY MEDICINE CLINIC | Age: 29
End: 2019-05-29

## 2019-05-29 LAB — CYTOLOGY THIN PREP PAP: NORMAL

## 2019-07-16 DIAGNOSIS — J01.01 ACUTE RECURRENT MAXILLARY SINUSITIS: ICD-10-CM

## 2019-07-16 DIAGNOSIS — E55.9 VITAMIN D DEFICIENCY: ICD-10-CM

## 2019-07-16 RX ORDER — MONTELUKAST SODIUM 10 MG/1
TABLET ORAL
Qty: 30 TABLET | Refills: 5 | Status: SHIPPED | OUTPATIENT
Start: 2019-07-16 | End: 2020-01-02

## 2019-09-03 DIAGNOSIS — N92.6 IRREGULAR MENSTRUATION: ICD-10-CM

## 2019-09-03 DIAGNOSIS — G43.821 MENSTRUAL MIGRAINE WITH STATUS MIGRAINOSUS, NOT INTRACTABLE: ICD-10-CM

## 2019-09-03 RX ORDER — LEVONORGESTREL AND ETHINYL ESTRADIOL 0.15-0.03
KIT ORAL
Qty: 84 TABLET | Refills: 1 | Status: SHIPPED | OUTPATIENT
Start: 2019-09-03 | End: 2020-01-09 | Stop reason: SDUPTHER

## 2019-09-05 ENCOUNTER — TELEPHONE (OUTPATIENT)
Dept: FAMILY MEDICINE CLINIC | Age: 29
End: 2019-09-05

## 2019-09-05 RX ORDER — FLUCONAZOLE 150 MG/1
150 TABLET ORAL DAILY
Qty: 3 TABLET | Refills: 0 | Status: SHIPPED | OUTPATIENT
Start: 2019-09-05 | End: 2019-09-08

## 2019-09-22 ENCOUNTER — HOSPITAL ENCOUNTER (EMERGENCY)
Age: 29
Discharge: HOME | End: 2019-09-22
Payer: MEDICARE

## 2019-09-22 ENCOUNTER — APPOINTMENT (OUTPATIENT)
Dept: GENERAL RADIOLOGY | Age: 29
End: 2019-09-22
Payer: MEDICARE

## 2019-09-22 VITALS
RESPIRATION RATE: 18 BRPM | TEMPERATURE: 99.2 F | WEIGHT: 290 LBS | HEART RATE: 61 BPM | DIASTOLIC BLOOD PRESSURE: 99 MMHG | OXYGEN SATURATION: 98 % | SYSTOLIC BLOOD PRESSURE: 164 MMHG | BODY MASS INDEX: 45.52 KG/M2 | HEIGHT: 67 IN

## 2019-09-22 DIAGNOSIS — M75.42 SHOULDER IMPINGEMENT SYNDROME, LEFT: Primary | ICD-10-CM

## 2019-09-22 PROCEDURE — 73030 X-RAY EXAM OF SHOULDER: CPT

## 2019-09-22 PROCEDURE — 99283 EMERGENCY DEPT VISIT LOW MDM: CPT

## 2019-09-22 PROCEDURE — 6370000000 HC RX 637 (ALT 250 FOR IP): Performed by: STUDENT IN AN ORGANIZED HEALTH CARE EDUCATION/TRAINING PROGRAM

## 2019-09-22 RX ORDER — CYCLOBENZAPRINE HCL 10 MG
10 TABLET ORAL 3 TIMES DAILY PRN
Qty: 21 TABLET | Refills: 0 | Status: SHIPPED | OUTPATIENT
Start: 2019-09-22 | End: 2019-10-02

## 2019-09-22 RX ORDER — CYCLOBENZAPRINE HCL 10 MG
10 TABLET ORAL ONCE
Status: COMPLETED | OUTPATIENT
Start: 2019-09-22 | End: 2019-09-22

## 2019-09-22 RX ORDER — TRAMADOL HYDROCHLORIDE 50 MG/1
50 TABLET ORAL EVERY 6 HOURS PRN
Qty: 12 TABLET | Refills: 0 | Status: SHIPPED | OUTPATIENT
Start: 2019-09-22 | End: 2019-09-25

## 2019-09-22 RX ORDER — TRAMADOL HYDROCHLORIDE 50 MG/1
50 TABLET ORAL ONCE
Status: COMPLETED | OUTPATIENT
Start: 2019-09-22 | End: 2019-09-22

## 2019-09-22 RX ADMIN — TRAMADOL HYDROCHLORIDE 50 MG: 50 TABLET, FILM COATED ORAL at 23:23

## 2019-09-22 RX ADMIN — CYCLOBENZAPRINE 10 MG: 10 TABLET, FILM COATED ORAL at 23:23

## 2019-09-22 ASSESSMENT — PAIN DESCRIPTION - ORIENTATION: ORIENTATION: LEFT

## 2019-09-22 ASSESSMENT — ENCOUNTER SYMPTOMS
GASTROINTESTINAL NEGATIVE: 1
COLOR CHANGE: 0
BACK PAIN: 0

## 2019-09-22 ASSESSMENT — PAIN DESCRIPTION - LOCATION: LOCATION: SHOULDER

## 2019-09-22 ASSESSMENT — PAIN SCALES - GENERAL: PAINLEVEL_OUTOF10: 6

## 2019-09-23 NOTE — ED TRIAGE NOTES
Pt has had left shoulder pain for the past week with no obvious injury. She has limited range of motion now.

## 2019-09-23 NOTE — ED PROVIDER NOTES
[facility]  eMERGENCY dEPARTMENT eNCOUnter          CHIEF COMPLAINT       Chief Complaint   Patient presents with    Shoulder Pain       Nurses Notes reviewed and I agree except as noted in the HPI. HISTORY OF PRESENT ILLNESS    Lurdes White is a 26 y.o. female who presents to the Emergency Department for the evaluation of left shoulder pain that started 8 days ago. She admits that the pain ha become more severe. She states that the pain is worse with movement and that she has limited ROM secondary to pain. She also states that the pain is worse in the evenings. She has tried ice and ibuprofen with no relief of symptoms. She denies numbness and tingling. She denies any history of shoulder injuries or arthritis. She denies any chance of pregnancy. She does admit that she had a migraine at the onset of symptoms 8 days ago, but that it has since resolved. No other complaints at this time. Location/Symptom: Left shoulder pain  Timing/Onset: 8 days ago  Context/Setting: Patient denies any known injury  Quality: Sharp  Duration: Constant, waxes and wanes  Modifying Factors: Worse at night and with movement  Severity: Not asked    REVIEW OF SYSTEMS     Review of Systems   Gastrointestinal: Negative. Genitourinary: Negative. Musculoskeletal: Positive for arthralgias (Left shoulder). Negative for back pain, gait problem, joint swelling, myalgias, neck pain and neck stiffness. Skin: Negative for color change, pallor, rash and wound. Neurological: Negative for dizziness, syncope, weakness, light-headedness, numbness and headaches (At onset of symptoms, has resolved). PAST MEDICAL HISTORY    has a past medical history of Anxiety and Postpartum depression. SURGICAL HISTORY      has a past surgical history that includes [place] tooth extraction (2006).     CURRENT MEDICATIONS       Discharge Medication List as of 9/22/2019 11:19 PM      CONTINUE these medications which have NOT times daily as needed for Muscle spasms, Disp-21 tablet, R-0Print             (Please note that portions of this note were completed with a voice recognition program.  Efforts were made to edit the dictations but occasionally words are mis-transcribed.)    The patient was given an opportunity to see the Emergency Attending. The patient voiced understanding that I was a Mid-Level Provider and was in agreement with being seen independently by myself. Scribe:  Brigette Gutierrez 9/22/19 11:14 PM Scribing for and in the presence of ZAIN Knutson.     Signed by: Stacey Raza, 09/23/19 12:23 AM    Provider:  I personally performed the services described in the documentation, reviewed and edited the documentation which was dictated to the scribe in my presence, and it accurately records my words and actions.     Sonia Kilgore PA-C 9/22/19 12:23 AM    CLAUDIA Lentz PA-C  09/23/19 [id number]

## 2019-10-05 DIAGNOSIS — G43.109 MIGRAINE WITH AURA AND WITHOUT STATUS MIGRAINOSUS, NOT INTRACTABLE: ICD-10-CM

## 2019-10-07 RX ORDER — TOPIRAMATE 25 MG/1
TABLET ORAL
Qty: 120 TABLET | Refills: 5 | Status: SHIPPED | OUTPATIENT
Start: 2019-10-07 | End: 2020-03-09

## 2019-11-03 DIAGNOSIS — G43.109 MIGRAINE WITH AURA AND WITHOUT STATUS MIGRAINOSUS, NOT INTRACTABLE: ICD-10-CM

## 2019-11-05 RX ORDER — SUMATRIPTAN 50 MG/1
TABLET, FILM COATED ORAL
Qty: 9 TABLET | Refills: 5 | Status: SHIPPED | OUTPATIENT
Start: 2019-11-05 | End: 2020-03-27

## 2020-01-02 RX ORDER — MONTELUKAST SODIUM 10 MG/1
TABLET ORAL
Qty: 180 TABLET | Refills: 0 | Status: SHIPPED | OUTPATIENT
Start: 2020-01-02 | End: 2020-06-01

## 2020-01-03 NOTE — TELEPHONE ENCOUNTER
Last visit- 4/16/2019  Next visit- 1/9/2020    Requested Prescriptions     Pending Prescriptions Disp Refills    vitamin D (RA VITAMIN D-3) 125 MCG (5000 UT) CAPS capsule [Pharmacy Med Name: RA VITAMIN D3 5,000 UNIT SFTGL] 30 capsule 5     Sig: take 1 capsule by mouth once daily

## 2020-01-06 ENCOUNTER — PATIENT MESSAGE (OUTPATIENT)
Dept: FAMILY MEDICINE CLINIC | Age: 30
End: 2020-01-06

## 2020-01-09 ENCOUNTER — OFFICE VISIT (OUTPATIENT)
Dept: FAMILY MEDICINE CLINIC | Age: 30
End: 2020-01-09
Payer: MEDICARE

## 2020-01-09 VITALS
SYSTOLIC BLOOD PRESSURE: 126 MMHG | HEART RATE: 72 BPM | BODY MASS INDEX: 45.99 KG/M2 | RESPIRATION RATE: 16 BRPM | TEMPERATURE: 98 F | WEIGHT: 293 LBS | HEIGHT: 67 IN | DIASTOLIC BLOOD PRESSURE: 74 MMHG

## 2020-01-09 PROCEDURE — G8484 FLU IMMUNIZE NO ADMIN: HCPCS | Performed by: NURSE PRACTITIONER

## 2020-01-09 PROCEDURE — G8427 DOCREV CUR MEDS BY ELIG CLIN: HCPCS | Performed by: NURSE PRACTITIONER

## 2020-01-09 PROCEDURE — 1036F TOBACCO NON-USER: CPT | Performed by: NURSE PRACTITIONER

## 2020-01-09 PROCEDURE — 99213 OFFICE O/P EST LOW 20 MIN: CPT | Performed by: NURSE PRACTITIONER

## 2020-01-09 PROCEDURE — G8417 CALC BMI ABV UP PARAM F/U: HCPCS | Performed by: NURSE PRACTITIONER

## 2020-01-09 RX ORDER — LEVONORGESTREL AND ETHINYL ESTRADIOL 0.15-0.03
KIT ORAL
Qty: 84 TABLET | Refills: 3 | Status: SHIPPED | OUTPATIENT
Start: 2020-01-09 | End: 2020-12-02

## 2020-01-09 ASSESSMENT — ENCOUNTER SYMPTOMS
NAUSEA: 0
SORE THROAT: 0
COLOR CHANGE: 0
DIARRHEA: 0
EYE DISCHARGE: 0
RHINORRHEA: 0
SHORTNESS OF BREATH: 0
COUGH: 0
CONSTIPATION: 0
ABDOMINAL DISTENTION: 0
ABDOMINAL PAIN: 0
ANAL BLEEDING: 0
EYE REDNESS: 0
BLOOD IN STOOL: 0

## 2020-01-09 ASSESSMENT — PATIENT HEALTH QUESTIONNAIRE - PHQ9
SUM OF ALL RESPONSES TO PHQ QUESTIONS 1-9: 0
SUM OF ALL RESPONSES TO PHQ QUESTIONS 1-9: 0
1. LITTLE INTEREST OR PLEASURE IN DOING THINGS: 0
2. FEELING DOWN, DEPRESSED OR HOPELESS: 0
SUM OF ALL RESPONSES TO PHQ9 QUESTIONS 1 & 2: 0

## 2020-01-09 NOTE — PATIENT INSTRUCTIONS
Patient Education        Abnormal Uterine Bleeding: Care Instructions  Your Care Instructions    Abnormal uterine bleeding (AUB) is irregular bleeding from the uterus that is longer or heavier than usual or does not occur at your regular time. Sometimes it is caused by changes in hormone levels. It can also be caused by growths in the uterus, such as fibroids or polyps. Sometimes a cause cannot be found. You may have heavy bleeding when you are not expecting your period. Your doctor may suggest a pregnancy test, if you think you are pregnant. Follow-up care is a key part of your treatment and safety. Be sure to make and go to all appointments, and call your doctor if you are having problems. It's also a good idea to know your test results and keep a list of the medicines you take. How can you care for yourself at home? · Be safe with medicines. Take pain medicines exactly as directed. ? If the doctor gave you a prescription medicine for pain, take it as prescribed. ? If you are not taking a prescription pain medicine, ask your doctor if you can take an over-the-counter medicine. · You may be low in iron because of blood loss. Eat a balanced diet that is high in iron and vitamin C. Foods rich in iron include red meat, shellfish, eggs, beans, and leafy green vegetables. Talk to your doctor about whether you need to take iron pills or a multivitamin. When should you call for help? Call 911 anytime you think you may need emergency care. For example, call if:    · You passed out (lost consciousness).    Call your doctor now or seek immediate medical care if:    · You have new or worse belly or pelvic pain.     · You have severe vaginal bleeding.     · You feel dizzy or lightheaded, or you feel like you may faint.    Watch closely for changes in your health, and be sure to contact your doctor if:    · You think you may be pregnant.     · Your bleeding gets worse.     · You do not get better as expected.    Where can you learn more? Go to https://chpepiceweb.healthSinbad's supply chainpartners. org and sign in to your [employer] account. Enter B725 in the Withlocals box to learn more about \"Abnormal Uterine Bleeding: Care Instructions. \"     If you do not have an account, please click on the \"Sign Up Now\" link. Current as of: February 19, 2019  Content Version: 12.3  © 7795-3858 [employer]. Care instructions adapted under license by [facility] ([facility]). If you have questions about a medical condition or this instruction, always ask your healthcare professional. [place] [address] any warranty or liability for your use of this information. Patient Education        Vaginal Bleeding in Nonpregnant Women: Care Instructions  Your Care Instructions    Many women have bleeding or spotting between periods. Lots of things can cause it. You may bleed because of hormone problems, stress, or ovulation. Fibroids and IUDs (intrauterine devices) can also cause bleeding. If your bleeding or spotting is caused by one of these things and is not heavy or doesn't happen often, you probably don't need to worry. But in rare cases, infection, cancer, or other serious conditions can cause bleeding. So you may need more tests to find the cause of your bleeding. The doctor has checked you carefully, but problems can develop later. If you notice any problems or new symptoms, get medical treatment right away. Follow-up care is a key part of your treatment and safety. Be sure to make and go to all appointments, and call your doctor if you are having problems. It's also a good idea to know your test results and keep a list of the medicines you take. How can you care for yourself at home? · Take pain medicines exactly as directed. ? If the doctor gave you a prescription medicine for pain, take it as prescribed. ? If you are not taking a prescription pain medicine, ask your doctor if you can take an over-the-counter medicine. Do not take aspirin, which may make bleeding worse. · If your doctor prescribed birth control pills for your bleeding, take them as directed. · Eat foods that are high in iron and vitamin C. Foods high in iron include red meat, shellfish, eggs, beans, and leafy green vegetables. Foods high in vitamin C include citrus fruits, tomatoes, and broccoli. Ask your doctor if you need to take iron pills or a multivitamin. · Ask your doctor when it is okay to have sex. When should you call for help? Call 911 anytime you think you may need emergency care. For example, call if:    · You passed out (lost consciousness).    Call your doctor now or seek immediate medical care if:    · You have severe vaginal bleeding.     · You are dizzy or lightheaded, or you feel like you may faint.     · You have new or worse belly or pelvic pain.    Watch closely for changes in your health, and be sure to contact your doctor if:    · Your bleeding gets worse.     · You think you might be pregnant.     · You do not get better as expected. Where can you learn more? Go to https://GO-SIMpearacelyPatientcoeb.Obvious Engineering. org and sign in to your Asterion account. Enter X952 in the TechShop box to learn more about \"Vaginal Bleeding in Nonpregnant Women: Care Instructions. \"     If you do not have an account, please click on the \"Sign Up Now\" link. Current as of: February 19, 2019  Content Version: 12.3  © 0780-1465 GetNotes. Care instructions adapted under license by [facility] ([facility]). If you have questions about a medical condition or this instruction, always ask your healthcare professional. [place] [address] any warranty or liability for your use of this information. Patient Education        Well Visit, Ages 25 to 48: Care Instructions  Your Care Instructions    Physical exams can help you stay healthy.  Your doctor has checked your overall health and may have suggested ways to take good care of yourself. He or she also may have recommended tests. At home, you can help prevent illness with healthy eating, regular exercise, and other steps. Follow-up care is a key part of your treatment and safety. Be sure to make and go to all appointments, and call your doctor if you are having problems. It's also a good idea to know your test results and keep a list of the medicines you take. How can you care for yourself at home? · Reach and stay at a healthy weight. This will lower your risk for many problems, such as obesity, diabetes, heart disease, and high blood pressure. · Get at least 30 minutes of physical activity on most days of the week. Walking is a good choice. You also may want to do other activities, such as running, swimming, cycling, or playing tennis or team sports. Discuss any changes in your exercise program with your doctor. · Do not smoke or allow others to smoke around you. If you need help quitting, talk to your doctor about stop-smoking programs and medicines. These can increase your chances of quitting for good. · Talk to your doctor about whether you have any risk factors for sexually transmitted infections (STIs). Having one sex partner (who does not have STIs and does not have sex with anyone else) is a good way to avoid these infections. · Use birth control if you do not want to have children at this time. Talk with your doctor about the choices available and what might be best for you. · Protect your skin from too much sun. When you're outdoors from 10 a.m. to 4 p.m., stay in the shade or cover up with clothing and a hat with a wide brim. Wear sunglasses that block UV rays. Even when it's cloudy, put broad-spectrum sunscreen (SPF 30 or higher) on any exposed skin. · See a dentist one or two times a year for checkups and to have your teeth cleaned. · Wear a seat belt in the car. Follow your doctor's advice about when to have certain tests. These tests can spot problems early.   For everyone  · Cholesterol. Have the fat (cholesterol) in your blood tested after age 21. Your doctor will tell you how often to have this done based on your age, family history, or other things that can increase your risk for heart disease. · Blood pressure. Have your blood pressure checked during a routine doctor visit. Your doctor will tell you how often to check your blood pressure based on your age, your blood pressure results, and other factors. · Vision. Talk with your doctor about how often to have a glaucoma test.  · Diabetes. Ask your doctor whether you should have tests for diabetes. · Colon cancer. Your risk for colorectal cancer gets higher as you get older. Some experts say that adults should start regular screening at age 48 and stop at age 76. Others say to start before age 48 or continue after age 76. Talk with your doctor about your risk and when to start and stop screening. For women  · Breast exam and mammogram. Talk to your doctor about when you should have a clinical breast exam and a mammogram. Medical experts differ on whether and how often women under 50 should have these tests. Your doctor can help you decide what is right for you. · Cervical cancer screening test and pelvic exam. Begin with a Pap test at age 24. The test often is part of a pelvic exam. Starting at age 27, you may choose to have a Pap test, an HPV test, or both tests at the same time (called co-testing). Talk with your doctor about how often to have testing. · Tests for sexually transmitted infections (STIs). Ask whether you should have tests for STIs. You may be at risk if you have sex with more than one person, especially if your partners do not wear condoms. For men  · Tests for sexually transmitted infections (STIs). Ask whether you should have tests for STIs. You may be at risk if you have sex with more than one person, especially if you do not wear a condom.   · Testicular cancer exam. Ask your doctor whether you should check your testicles regularly. · Prostate exam. Talk to your doctor about whether you should have a blood test (called a PSA test) for prostate cancer. Experts differ on whether and when men should have this test. Some experts suggest it if you are older than 39 and are -American or have a father or brother who got prostate cancer when he was younger than 72. When should you call for help? Watch closely for changes in your health, and be sure to contact your doctor if you have any problems or symptoms that concern you. Where can you learn more? Go to https://Quest apppeShopping Maileb.Urban Renewable H2. org and sign in to your [employer] account. Enter P072 in the zanda box to learn more about \"Well Visit, Ages 25 to 48: Care Instructions. \"     If you do not have an account, please click on the \"Sign Up Now\" link. Current as of: August 21, 2019  Content Version: 12.3  © 9622-8479 Healthwise, Incorporated. Care instructions adapted under license by [facility] ([facility]). If you have questions about a medical condition or this instruction, always ask your healthcare professional. [place] [address] any warranty or liability for your use of this information.

## 2020-01-09 NOTE — PROGRESS NOTES
Varicella Vaccine (1 of 2 - 2-dose childhood series) 1990    DTaP/Tdap/Td vaccine (1 - Tdap) 2000    Flu vaccine (1) 2019    Cervical cancer screen  2022    HIV screen  Completed    Pneumococcal 0-64 years Vaccine  Aged Out     Past Medical History:   Diagnosis Date    Anxiety 2016    Postpartum depression       Past Surgical History:   Procedure Laterality Date    WISDOM TOOTH EXTRACTION       Family History   Problem Relation Age of Onset   [de-identified] / [place] Mother     High Blood Pressure Father     High Blood Pressure Brother     Diabetes Maternal Grandmother      Social History     Tobacco Use    Smoking status: Former Smoker     Last attempt to quit: 2006     Years since quittin.6    Smokeless tobacco: Never Used   Substance Use Topics    Alcohol use: No      Current Outpatient Medications   Medication Sig Dispense Refill    levonorgestrel-ethinyl estradiol (MARLISSA) 0.15-30 MG-MCG per tablet take 1 tablet by mouth once daily 84 tablet 3    vitamin D (RA VITAMIN D-3) 125 MCG (5000 UT) CAPS capsule take 1 capsule by mouth once daily 30 capsule 5    montelukast (SINGULAIR) 10 MG tablet take 1 tablet by mouth once daily 180 tablet 0    SUMAtriptan (IMITREX) 50 MG tablet take 1 tablet by mouth if needed 9 tablet 5    topiramate (TOPAMAX) 25 MG tablet take 1 tablet by mouth at bedtime - MAY INCREASE TO 2 TABLETS TWICE A  tablet 5    loratadine (CLARITIN) 10 MG tablet Take 1 tablet by mouth daily 30 tablet 0    guaiFENesin (ROBITUSSIN) 100 MG/5ML liquid Take 10 mLs by mouth 3 times daily as needed for Cough 1 Bottle 0    Omega-3 Fatty Acids (RA FISH OIL) 1000 MG CAPS take 1 capsule by mouth three times a day 90 capsule 5    busPIRone (BUSPAR) 5 MG tablet Take 1 tablet by mouth 3 times daily as needed (anxiety) 90 tablet 5    Cinnamon 500 MG CAPS Take four times daily before meals and at bedtime 120 capsule 5    fluticasone (FLONASE) 50 MCG/ACT nasal spray instill 1 spray into each nostril once daily 1 Bottle 1    Vitamins-Lipotropics (BALANCED B-100 COMPLEX CR) TBCR Take 1 tablet by mouth 4 times daily (after meals and at bedtime) 120 tablet 5    Phenylephrine-DM-GG (MUCINEX FAST-MAX CONGEST COUGH) 5- MG TABS Take by mouth      Saline 0.2 % SOLN by Nasal route      guaiFENesin (MUCINEX) 600 MG extended release tablet Take 1 tablet by mouth 2 times daily 20 tablet 1    Blood Pressure Monitor KIT Take blood pressure daily 1 kit 0    Elastic Bandages & Supports (B & B ELASTIC ANKLE BRACE) MISC Wear brace when up walking during the day 1 each 0     No current facility-administered medications for this visit. Allergies   Allergen Reactions    Latex Rash     And itching    Adhesive Tape Rash       Subjective:    Review of Systems   Constitutional: Negative for chills, fatigue and fever. HENT: Negative for congestion, ear pain, postnasal drip, rhinorrhea and sore throat. Eyes: Negative for discharge and redness. Respiratory: Negative for cough and shortness of breath. Cardiovascular: Negative for chest pain and leg swelling. Gastrointestinal: Negative for abdominal distention, abdominal pain, anal bleeding, blood in stool, constipation, diarrhea and nausea. Skin: Negative for color change and rash. Neurological: Negative for facial asymmetry, speech difficulty and weakness. Hematological: Does not bruise/bleed easily. Psychiatric/Behavioral: Negative for agitation and confusion. Objective:     Vitals:    01/09/20 1401   BP: 126/74   Site: Left Upper Arm   Position: Sitting   Cuff Size: Large Adult   Pulse: 72   Resp: 16   Temp: 98 °F (36.7 °C)   TempSrc: Oral   Weight: (!) 300 lb 9.6 oz (136.4 kg)   Height: 5' 7.01\" (1.702 m)       Body mass index is 47.07 kg/m².     Wt Readings from Last 3 Encounters:   01/09/20 (!) 300 lb 9.6 oz (136.4 kg)   09/22/19 290 lb (131.5 kg)   05/21/19 290 lb (131.5 kg) BP Readings from Last 3 Encounters:   01/09/20 126/74   09/22/19 (!) 164/99   05/21/19 118/80     Physical Exam  Constitutional:       General: She is not in acute distress. Appearance: She is well-developed. She is not ill-appearing or diaphoretic. HENT:      Head: Normocephalic and atraumatic. Right Ear: Hearing and external ear normal. No decreased hearing noted. Left Ear: Hearing and external ear normal. No decreased hearing noted. Nose: Nose normal. No nasal deformity. Eyes:      General:         Right eye: No discharge. Left eye: No discharge. Conjunctiva/sclera: Conjunctivae normal.   Neck:      Musculoskeletal: Normal range of motion and neck supple. Pulmonary:      Effort: Pulmonary effort is normal. No respiratory distress. Abdominal:      General: There is no distension. Tenderness: There is no guarding. Musculoskeletal: Normal range of motion. General: No tenderness or deformity. Skin:     Coloration: Skin is not pale. Findings: No erythema or rash (On exposed areas). Neurological:      Mental Status: She is alert. Gait: Gait normal.   Psychiatric:         Speech: Speech normal.         Behavior: Behavior normal.         Thought Content: Thought content normal.         Judgment: Judgment normal.       Lab Results   Component Value Date    WBC 8.1 01/29/2019    HGB 14.6 01/29/2019    HCT 44.4 01/29/2019     01/29/2019    CHOL 181 01/29/2019    TRIG 161 01/29/2019    HDL 45 01/29/2019    LDLCALC 104 01/29/2019    AST 13 01/29/2019     01/29/2019    K 4.5 01/29/2019     01/29/2019    CREATININE 0.5 01/29/2019    BUN 7 01/29/2019    CO2 24 01/29/2019    TSH 2.890 01/29/2019    LABA1C 5.3 09/22/2016    LABGLOM >90 01/29/2019    MG 2.0 01/29/2019    CALCIUM 9.2 01/29/2019    VITD25 24 (L) 01/29/2019     Assessment:       Diagnosis Orders   1.  Wellness examination  Varicella Zoster Antibody, IgG    Varicella Zoster

## 2020-03-09 RX ORDER — TOPIRAMATE 25 MG/1
TABLET ORAL
Qty: 120 TABLET | Refills: 5 | Status: SHIPPED | OUTPATIENT
Start: 2020-03-09 | End: 2020-09-12

## 2020-03-27 RX ORDER — SUMATRIPTAN 50 MG/1
TABLET, FILM COATED ORAL
Qty: 9 TABLET | Refills: 5 | Status: SHIPPED | OUTPATIENT
Start: 2020-03-27 | End: 2020-11-16 | Stop reason: SDUPTHER

## 2020-06-01 RX ORDER — MONTELUKAST SODIUM 10 MG/1
TABLET ORAL
Qty: 180 TABLET | Refills: 0 | Status: SHIPPED | OUTPATIENT
Start: 2020-06-01 | End: 2020-12-04

## 2020-06-01 RX ORDER — CHOLECALCIFEROL (VITAMIN D3) 125 MCG
CAPSULE ORAL
Qty: 30 CAPSULE | Refills: 5 | Status: SHIPPED | OUTPATIENT
Start: 2020-06-01 | End: 2020-12-04

## 2020-06-01 NOTE — TELEPHONE ENCOUNTER
Last visit- 1/9/2020  Next visit- Visit date not found    Requested Prescriptions     Pending Prescriptions Disp Refills    montelukast (SINGULAIR) 10 MG tablet [Pharmacy Med Name: MONTELUKAST SOD 10 MG TABLET] 180 tablet 0     Sig: take 1 tablet by mouth once daily    vitamin D (RA VITAMIN D-3) 125 MCG (5000 UT) CAPS capsule [Pharmacy Med Name: RA VITAMIN D3 5,000 UNIT SFTGL] 30 capsule 5     Sig: take 1 capsule by mouth once daily     Please review, approve or deny. Dr. Melecio Cosby is out of the office.

## 2020-09-11 NOTE — TELEPHONE ENCOUNTER
Patient's last appointment was : 1/9/2020  Patient's next appointment is : Visit date not found  Last refilled:  3/9/2020

## 2020-09-12 RX ORDER — TOPIRAMATE 25 MG/1
TABLET ORAL
Qty: 120 TABLET | Refills: 5 | Status: SHIPPED | OUTPATIENT
Start: 2020-09-12 | End: 2021-03-01

## 2020-09-30 RX ORDER — SUMATRIPTAN 50 MG/1
TABLET, FILM COATED ORAL
Qty: 9 TABLET | Refills: 5 | OUTPATIENT
Start: 2020-09-30

## 2020-09-30 NOTE — TELEPHONE ENCOUNTER
Spoke with patient and she is not needing this refilled at this time and declined a follow up appointment.

## 2020-10-16 ENCOUNTER — TELEPHONE (OUTPATIENT)
Dept: FAMILY MEDICINE CLINIC | Age: 30
End: 2020-10-16

## 2020-10-16 NOTE — TELEPHONE ENCOUNTER
Received a refill request by [facility] for sumatriptan succ 50 mg.  Alexia Victoria already refused this on 9/28/20. Patient was called then and refused a f/u appt at this time. I called and lm that if she does need this medication she would need an appt.

## 2020-10-21 ENCOUNTER — TELEPHONE (OUTPATIENT)
Dept: FAMILY MEDICINE CLINIC | Age: 30
End: 2020-10-21

## 2020-11-16 ENCOUNTER — TELEPHONE (OUTPATIENT)
Dept: FAMILY MEDICINE CLINIC | Age: 30
End: 2020-11-16

## 2020-11-16 NOTE — TELEPHONE ENCOUNTER
Patient's last appointment was : 1/9/2020  Patient's next appointment is : Visit date not found  Last refilled:  3/27/2020

## 2020-11-18 RX ORDER — SUMATRIPTAN 50 MG/1
TABLET, FILM COATED ORAL
Qty: 9 TABLET | Refills: 1 | Status: SHIPPED | OUTPATIENT
Start: 2020-11-18 | End: 2021-01-07

## 2020-11-30 NOTE — TELEPHONE ENCOUNTER
Patient's last appointment was : 1/9/2020  Patient's next appointment is : Visit date not found  Last refilled: 01/09/2020

## 2020-11-30 NOTE — TELEPHONE ENCOUNTER
So sorry - but I do think maybe the drug store is requesting these refills? In October we asked the patient and they said they did not want either an apt or the refills - can we call again and get some clarification? Is her asthma under control?   We would like to see her a few times a year if on more than 2 medications- one for a wellness and one for the headaches and allergies and asthma/wheezing especially if any problems

## 2020-11-30 NOTE — TELEPHONE ENCOUNTER
Patient's last appointment was : 1/9/2020  Patient's next appointment is : 11/27/2020  Last refilled:   6/1/2020

## 2020-12-02 ENCOUNTER — HOSPITAL ENCOUNTER (OUTPATIENT)
Age: 30
Discharge: HOME | End: 2020-12-02
Payer: MEDICARE

## 2020-12-02 ENCOUNTER — TELEPHONE (OUTPATIENT)
Dept: FAMILY MEDICINE CLINIC | Age: 30
End: 2020-12-02

## 2020-12-02 DIAGNOSIS — Z20.822 ENCOUNTER FOR SCREENING LABORATORY TESTING FOR COVID-19 VIRUS: ICD-10-CM

## 2020-12-02 PROCEDURE — U0003 INFECTIOUS AGENT DETECTION BY NUCLEIC ACID (DNA OR RNA); SEVERE ACUTE RESPIRATORY SYNDROME CORONAVIRUS 2 (SARS-COV-2) (CORONAVIRUS DISEASE [COVID-19]), AMPLIFIED PROBE TECHNIQUE, MAKING USE OF HIGH THROUGHPUT TECHNOLOGIES AS DESCRIBED BY CMS-2020-01-R: HCPCS

## 2020-12-02 PROCEDURE — 99211 OFF/OP EST MAY X REQ PHY/QHP: CPT

## 2020-12-02 RX ORDER — LEVONORGESTREL AND ETHINYL ESTRADIOL 0.15-0.03
KIT ORAL
Qty: 30 TABLET | Refills: 0 | Status: SHIPPED | OUTPATIENT
Start: 2020-12-02 | End: 2020-12-08 | Stop reason: SDUPTHER

## 2020-12-02 NOTE — TELEPHONE ENCOUNTER
Ordered - thanks! COVID 19 testing POSITIVE. Can you be sure to stay home and not go out or do social activities, be sure to distance when around people, self-quarantine, hand hygiene, avoiding touching the face, and mask wearing for the next 10 days (from date of testing Positive)   OK for work excuse if needed. How are you feeling at this time?

## 2020-12-02 NOTE — TELEPHONE ENCOUNTER
Pt left message on voice mail stating that she was exposed to her mother and sister that have tested positive for COVID. Pt is now experiencing symptoms : congestion, sore throat, headache, cough and nausea. Pt is requesting a COVID test. Please advise.

## 2020-12-02 NOTE — TELEPHONE ENCOUNTER
Spoke with pt, informed pt of Covid test ordered and where to get testing done. Pt verbalized understanding. No further questions at this time.

## 2020-12-04 LAB — SARS-COV-2: NOT DETECTED

## 2020-12-04 RX ORDER — CHOLECALCIFEROL (VITAMIN D3) 125 MCG
CAPSULE ORAL
Qty: 30 CAPSULE | Refills: 5 | Status: SHIPPED | OUTPATIENT
Start: 2020-12-04 | End: 2021-05-20

## 2020-12-04 RX ORDER — MONTELUKAST SODIUM 10 MG/1
TABLET ORAL
Qty: 180 TABLET | Refills: 0 | Status: SHIPPED | OUTPATIENT
Start: 2020-12-04 | End: 2021-05-20

## 2020-12-08 ENCOUNTER — TELEPHONE (OUTPATIENT)
Dept: FAMILY MEDICINE CLINIC | Age: 30
End: 2020-12-08

## 2020-12-08 ENCOUNTER — TELEMEDICINE (OUTPATIENT)
Dept: FAMILY MEDICINE CLINIC | Age: 30
End: 2020-12-08
Payer: MEDICARE

## 2020-12-08 PROCEDURE — G8427 DOCREV CUR MEDS BY ELIG CLIN: HCPCS | Performed by: NURSE PRACTITIONER

## 2020-12-08 PROCEDURE — 99214 OFFICE O/P EST MOD 30 MIN: CPT | Performed by: NURSE PRACTITIONER

## 2020-12-08 RX ORDER — FLUOXETINE HYDROCHLORIDE 20 MG/1
20 CAPSULE ORAL DAILY
Qty: 30 CAPSULE | Refills: 3 | Status: SHIPPED | OUTPATIENT
Start: 2020-12-08 | End: 2021-07-27

## 2020-12-08 RX ORDER — LEVONORGESTREL AND ETHINYL ESTRADIOL 0.15-0.03
KIT ORAL
Qty: 30 TABLET | Refills: 3 | Status: SHIPPED | OUTPATIENT
Start: 2020-12-08 | End: 2021-03-29

## 2020-12-08 NOTE — TELEPHONE ENCOUNTER
[facility] scheduling dept called states that the pt is scheduled for a mammogram, scheduling is alsoneeding an order for a US bilateral breast for the breast pain. Please place the order.

## 2020-12-08 NOTE — TELEPHONE ENCOUNTER
Spoke with pt. Informed pt of [facility] central scheduling number for pt to call to get testing scheduled. Pt verbalized understanding.

## 2020-12-09 ASSESSMENT — ENCOUNTER SYMPTOMS
EYE DISCHARGE: 0
SORE THROAT: 0
DIARRHEA: 0
ABDOMINAL DISTENTION: 0
ABDOMINAL PAIN: 0
COLOR CHANGE: 0
CONSTIPATION: 0
COUGH: 0
BLOOD IN STOOL: 0
ANAL BLEEDING: 0
RHINORRHEA: 0
SHORTNESS OF BREATH: 0
EYE REDNESS: 0
NAUSEA: 0

## 2020-12-09 NOTE — PROGRESS NOTES
[address] [facility]  [phone] (phone)  [phone] (fax)    Visit Date: 2020    Hnanah White is a 31 y.o. female who presents today for:  Chief Complaint   Patient presents with    Medication Refill    Breast Pain    Hip Pain     HPI:   THIS VISIT WAS PERFORMED VIA A SYNCHRONOUS TELECOMMUNICATION SYSTEM. Patient gave consent for synchronous telecommunication visit during FAOLJ-02 public health emergency. I was present in my home utilizing EPIC patient was in their home.   Visit was started at 0900    Needs refill of birth control    Having intermittnet pain in both breast - into armpits - feels like a pinch great grandmother had breast cancer    Bilateral hip pain - feels like they come out of place - started after giving birth - goes to chiropractor often     Grandmother who she cared for passed - she is feeling very sad and down  HPI  Health Maintenance   Topic Date Due    Varicella vaccine (1 of 2 - 2-dose childhood series) 1990    DTaP/Tdap/Td vaccine (1 - Tdap) 2008    Flu vaccine (1) 2020    Cervical cancer screen  2022    HIV screen  Completed    Hepatitis A vaccine  Aged Out    Hepatitis B vaccine  Aged Out    Hib vaccine  Aged Out    Meningococcal (ACWY) vaccine  Aged Out    Pneumococcal 0-64 years Vaccine  Aged Out     Past Medical History:   Diagnosis Date    Anxiety 2016    Postpartum depression       Past Surgical History:   Procedure Laterality Date    WISDOM TOOTH EXTRACTION       Family History   Problem Relation Age of Onset   [de-identified] / [place] Mother     High Blood Pressure Father     High Blood Pressure Brother     Diabetes Maternal Grandmother      Social History     Tobacco Use    Smoking status: Former Smoker     Last attempt to quit: 2006     Years since quittin.5    Smokeless tobacco: Never Used   Substance Use Topics    Alcohol use: No      Current Outpatient Medications   Medication Sig Dispense Refill    FLUoxetine (PROZAC) 20 MG capsule Take 1 capsule by mouth daily 30 capsule 3    levonorgestrel-ethinyl estradiol (NORDETTE) 0.15-30 MG-MCG per tablet take 1 tablet by mouth once daily 30 tablet 3    RA VITAMIN D-3 125 MCG (5000 UT) CAPS capsule take 1 capsule by mouth once daily 30 capsule 5    montelukast (SINGULAIR) 10 MG tablet take 1 tablet by mouth once daily 180 tablet 0    SUMAtriptan (IMITREX) 50 MG tablet take 1 tablet by mouth if needed AT ONSET OF HEADACHE may repeat in 2 hours IF headache PERSISTS maximum daily dose of 2 tablets ( 100 milligrams ) every 24 hours 9 tablet 1    topiramate (TOPAMAX) 25 MG tablet take 1 tablet by mouth at bedtime MAY INCREASE TO 2 tablets by mouth twice a day 120 tablet 5    loratadine (CLARITIN) 10 MG tablet Take 1 tablet by mouth daily 30 tablet 0    guaiFENesin (ROBITUSSIN) 100 MG/5ML liquid Take 10 mLs by mouth 3 times daily as needed for Cough 1 Bottle 0    Omega-3 Fatty Acids (RA FISH OIL) 1000 MG CAPS take 1 capsule by mouth three times a day 90 capsule 5    busPIRone (BUSPAR) 5 MG tablet Take 1 tablet by mouth 3 times daily as needed (anxiety) 90 tablet 5    Cinnamon 500 MG CAPS Take four times daily before meals and at bedtime 120 capsule 5    fluticasone (FLONASE) 50 MCG/ACT nasal spray instill 1 spray into each nostril once daily 1 Bottle 1    Vitamins-Lipotropics (BALANCED B-100 COMPLEX CR) TBCR Take 1 tablet by mouth 4 times daily (after meals and at bedtime) 120 tablet 5    Phenylephrine-DM-GG (MUCINEX FAST-MAX CONGEST COUGH) 5- MG TABS Take by mouth      Saline 0.2 % SOLN by Nasal route      guaiFENesin (MUCINEX) 600 MG extended release tablet Take 1 tablet by mouth 2 times daily 20 tablet 1    Blood Pressure Monitor KIT Take blood pressure daily 1 kit 0    Elastic Bandages & Supports (B & B ELASTIC ANKLE BRACE) MISC Wear brace when up walking during the day 1 each 0     No current facility-administered Vitals/Constitutional/EENT/Resp/CV/GI//MS/Neuro/Skin/Heme-Lymph-Imm. Pursuant to the emergency declaration under the [address] [place] [address], [address] [place] [address] and the [employer] and [employer] Act, this Virtual Visit was conducted with patient's (and/or legal guardian's) consent, to reduce the patient's risk of exposure to COVID-19 and provide necessary medical care. The patient (and/or legal guardian) has also been advised to contact this office for worsening conditions or problems, and seek emergency medical treatment and/or call 911 if deemed necessary. Services were provided through a video synchronous discussion virtually to substitute for in-person clinic visit. Patient and provider were located at their individual homes. --EARL Cueva CNP on 12/9/2020 at 10:05 AM    An electronic signature was used to authenticate this note.      Electronically signed by EARL Cueva CNP on 12/9/2020 at 10:05 AM

## 2020-12-11 ENCOUNTER — HOSPITAL ENCOUNTER (OUTPATIENT)
Age: 30
Discharge: HOME | End: 2020-12-11
Payer: MEDICARE

## 2020-12-11 ENCOUNTER — HOSPITAL ENCOUNTER (OUTPATIENT)
Dept: GENERAL RADIOLOGY | Age: 30
Discharge: HOME | End: 2020-12-11
Payer: MEDICARE

## 2020-12-11 ENCOUNTER — HOSPITAL ENCOUNTER (OUTPATIENT)
Dept: WOMENS IMAGING | Age: 30
Discharge: HOME | End: 2020-12-11
Payer: MEDICARE

## 2020-12-11 PROCEDURE — 76642 ULTRASOUND BREAST LIMITED: CPT

## 2020-12-11 PROCEDURE — 73523 X-RAY EXAM HIPS BI 5/> VIEWS: CPT

## 2020-12-11 PROCEDURE — G0279 TOMOSYNTHESIS, MAMMO: HCPCS

## 2020-12-18 ENCOUNTER — TELEPHONE (OUTPATIENT)
Dept: FAMILY MEDICINE CLINIC | Age: 30
End: 2020-12-18

## 2020-12-18 NOTE — TELEPHONE ENCOUNTER
----- Message from EARL Whitaker - CNP sent at 12/17/2020  1:34 PM EST -----  Ordered PT - noted female only

## 2020-12-22 ENCOUNTER — HOSPITAL ENCOUNTER (OUTPATIENT)
Dept: PHYSICAL THERAPY | Age: 30
Setting detail: THERAPIES SERIES
Discharge: HOME | End: 2020-12-22
Payer: MEDICARE

## 2020-12-22 PROCEDURE — 97140 MANUAL THERAPY 1/> REGIONS: CPT

## 2020-12-22 PROCEDURE — 97161 PT EVAL LOW COMPLEX 20 MIN: CPT

## 2020-12-22 PROCEDURE — 97110 THERAPEUTIC EXERCISES: CPT

## 2020-12-22 NOTE — PROGRESS NOTES
** PLEASE SIGN, DATE AND TIME CERTIFICATION BELOW AND RETURN TO [facility] OUTPATIENT REHABILITATION (FAX #: [phone]). ATTEST/CO-SIGN IF ACCESSING VIA INArchy. THANK YOU.**    I certify that I have examined the patient below and determined that Physical Medicine and Rehabilitation service is necessary and that I approve the established plan of care for up to 90 days or as specifically noted. Attestation, signature or co-signature of physician indicates approval of certification requirements.    ________________________ ____________ __________  Physician Signature   Date   Time  [address] [facility]  PHYSICAL THERAPY  [x] EVALUATION  [] DAILY NOTE (LAND) [] DAILY NOTE (AQUATIC ) [] PROGRESS NOTE [] DISCHARGE NOTE    [x] [address] [facility]   [] [place] [address]    [] [facility]   [] Kandi Bosworth    Date: 2020  Patient Name:  Emily Martines  : 1990  MRN: [id number]  CSN: [id number]    Referring Practitioner EARL Lagunas*   Diagnosis Pain in right hip [M25.551]    Treatment Diagnosis Bilateral hip pain, low back pain, core and leg weakness, difficulty with ambulation   Date of Evaluation 20    Additional Pertinent History Anxiety disorder, Obesity, Arthritis      Functional Outcome Measure Used Tinetti   Functional Outcome Score 18/28 (20)       Insurance: Primary: Payor: Tommy Lira /  /  / ,   Secondary:    Authorization Information: Allowed 30 visits PT per calendar year. Aquatics and modalities except [place] and HP/CP covered. Telehealth covered. Visit # 1, 1/10 for progress note   Visits Allowed: 30   Recertification Date:    Physician Follow-Up: None   Physician Orders: Prefers female therapists   History of Present Illness:      SUBJECTIVE: Patient reports has been having trouble with her hips for awhile and finally decided to do something about it.  States had some testing done and was told that has arthritis in hips and low back, more on right side.  has been seeing a chiropractor because her hips were \"going out. \"  has been sent to therapy to see if can help. Reports also gained weight after having kids which she thinks does not help. Social/Functional History and Current Status:  Medications and Allergies have been reviewed and are listed on Medical History Questionnaire. Homero Ashton lives with spouse in a multiple floor home with stairs and no handrail to enter. .  has been sleeping downstairs on couch due to body hurts when gets up in the morning when sleeps in bed.  has 4-5 steps outside of home. 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 Left leg shorter than right leg in supine. Right on left sacral torsion. Posture Fair       Range of Motion Lumbar flexion limited 75%, Extension limited 25%, SB WFL bilat - pain and stretching with all motions. Tight HS, piriformis and psoas muscles   Strength Bilat legs 4-5/5 throughout. Moderate core weakness   Coordination    Sensation Normal.  recently has had some tingling in toes on and off.    Bed Mobility    Transfers    Ambulation Decreased speed, waddling pattern, uneven step length, slightly unsteady   Stairs Patient reports pain with stairs   Balance No falls but patient slightly unsteady with gait   Special Tests          TREATMENT   Precautions: None   Pain: 5/10 hips and lower back    X in shaded column indicates activity completed today   Modalities Parameters/  Location  Notes                     Manual Therapy Time/Technique  Notes   Muscle energy to address right on left sacral torsion  X Normal and even sacral motion after treatment   Muscle energy to address left posterior hip rotation   X Hips level after treatment         Exercise/Intervention   Notes   Educated on reasons for her pelvic obliquities and why PT performed muscle energy. Educated patient on what can do at home to help if feels has posterior rotation again. Also educated on sleeping posture. use of pillow between knees and what to watch for over next 24 hours. Educated on ice versus heat. Also educated on activity for home   X                                                                            Specific Interventions Next Treatment: manual therapy to address pelvic obliquities, manual therapy for TP work or muscle tightness, posture education, stretches for piriformis/HS/Psoas, progress to strengthening for core. Modalities or pool therapy as needed. Activity/Treatment Tolerance:  [x]  Patient tolerated treatment well  []  Patient limited by fatigue  []  Patient limited by pain   []  Patient limited by medical complications  []  Other:     Assessment: Patient with history of hip and low back pain and was just recently told has arthritis and to start therapy. Patient with pelvic and sacral obliquities noted that could be a partial reason for her pain. Used muscle energy today to address and patient with significant decrease in pain and improvement in mobility after treatment. Need to make sure patient's pelvis stays in alignment and then use therapy to address her tightness and weakness throughout her core.   Body Structures/Functions/Activity Limitations: impaired activity tolerance, impaired balance, impaired endurance, impaired ROM, impaired sensation, impaired strength, pain and abnormal gait  Prognosis: good    GOALS:  Patient Goal: To help get pain under control or abolish it so that she can do an exercise program to help lose weight    Short Term Goals:  Time Frame: 4 weeks  1) Patient to report 25-50% decrease in bilateral hip and low back pain to return to sleeping in her bed again with little to no soreness in the AM.  2) Patient to demonstrate full lumbar range without pain for ease with dressing  3) Patient to demonstrate abdominal bracing with no more than 1-2 cues for increased lumbar stability with all lifting at home  4) Patient to demonstrate 4+-5/5 strength in bilateral legs for ease with doing work around home  5) Patient to demonstrate continued neutral pelvis and sacral positions to decrease stress and strain on hips and low back for caring for children      Long Term Goals:  Time Frame: 12 weeks  1)Patient to be independent with home program to perform all daily activity with minimal to no pain. Patient Education:   [x]  HEP/Education Completed: Plan of Care, Goals, Muscle energy for posterior rotation, Ice/heat, monitor pain with transitions   Medbridge Access Code:  []  No new Education completed  []  Reviewed Prior HEP      [x]  Patient verbalized and/or demonstrated understanding of education provided. []  Patient unable to verbalize and/or demonstrate understanding of education provided. Will continue education. []  Barriers to learning: None    PLAN:  Treatment Recommendations: Strengthening, Range of Motion, Balance Training, Functional Mobility Training, Transfer Training, Endurance Training, Gait Training, Stair Training, Neuromuscular Re-education, Manual Therapy - Soft Tissue Mobilization, Pain Management, Home Exercise Program, Patient Education, Aquatics and Modalities    [x]  Plan of care initiated. Plan to see patient 2 times per week for 12 weeks to address the treatment planned outlined above.   []  Continue with current plan of care  []  Modify plan of care as follows:    []  Hold pending physician visit  []  Discharge    Time In 0830   Time Out 0930   Timed Code Minutes: 20 min   Total Treatment Time: 60 min       Electronically Signed by: [place] [id number]

## 2020-12-29 ENCOUNTER — HOSPITAL ENCOUNTER (OUTPATIENT)
Dept: PHYSICAL THERAPY | Age: 30
Setting detail: THERAPIES SERIES
Discharge: HOME | End: 2020-12-29
Payer: MEDICARE

## 2020-12-29 PROCEDURE — 97110 THERAPEUTIC EXERCISES: CPT

## 2020-12-29 NOTE — PROGRESS NOTES
[facility]  PHYSICAL THERAPY  [] EVALUATION  [x] DAILY NOTE (LAND) [] DAILY NOTE (AQUATIC ) [] PROGRESS NOTE [] DISCHARGE NOTE    [x] OUTPATIENT REHABILITATION [facility]   [] [place] [address]    [] [facility]   [] Lorna Pagan    Date: 2020  Patient Name:  Marium Cueva  : 1990  MRN: [id number]  CSN: [id number]    Referring Practitioner EARL Mohamud*   Diagnosis Pain in right hip [M25.551]    Treatment Diagnosis Bilateral hip pain, low back pain, core and leg weakness, difficulty with ambulation   Date of Evaluation 20    Additional Pertinent History Anxiety disorder, Obesity, Arthritis      Functional Outcome Measure Used Tinetti   Functional Outcome Score  (20)       Insurance: Primary: Payor: Elvia James /  /  / ,   Secondary:    Authorization Information: Allowed 30 visits PT per calendar year. Aquatics and modalities except Ionto and HP/CP covered. Telehealth covered. Visit # 2, 2/10 for progress note   Visits Allowed: 30   Recertification Date:    Physician Follow-Up: None   Physician Orders: Prefers female therapists   History of Present Illness:      SUBJECTIVE: Patient reports was sore after last session but feels better than has in the past. States has a feeling like might have a spasm/cramps in her muscles but she has not. Reports forgot to mention at her evaluation that if her heart rate increases or she does some increased activity she will get a throbbing pain in her neck and her low back, nothing in between, for about 10-20 seconds - has to stop what she is doing due to hurts a lot and wait until it passes.  Reports has been going on since the day after her last child was born and she had an epidural.    TREATMENT   Precautions: None   Pain: 5/10 hips and lower back    X in shaded column indicates activity completed today   Modalities Parameters/  Location  Notes Manual Therapy Time/Technique  Notes   Re-assessed for right on left sacral torsion  X No torsion noted today   Re-assessed for left posterior hip rotation   X No leg length difference noted today         Exercise/Intervention   Notes   Seated: Abdominal bracing                Rows                Marching                LAQ                 Adduction squeezes                Abduction 5 sec        5 sec 10x  10x  10x  10x  10x  10x X Patient reported some tingling in toes and numbness in left calf with exercises          Orange band   Educated on proper sitting posture on the floor and sitting/standing posture   X    Semi-reclined: butterfly stretch  Standing:         Hip flexor stretch 3x 15-20 sec X                                                              Specific Interventions Next Treatment: manual therapy to address pelvic obliquities, manual therapy for TP work or muscle tightness, posture education, stretches for piriformis/HS/Psoas, progress to strengthening for core. Modalities or pool therapy as needed. Activity/Treatment Tolerance:  [x]  Patient tolerated treatment well  []  Patient limited by fatigue  []  Patient limited by pain   []  Patient limited by medical complications  []  Other:     Assessment: Patient maintaining hips and pelvis in neutral position so started strengthening today. Patient not able to tolerate laying flat so started most activity in sitting. Patient with core and leg weakness that needs addressed to help give her back stability. Patient also with tightness around pelvis that needs addressed with stretching. No increase in pain noted after session.     GOALS:  Patient Goal: To help get pain under control or abolish it so that she can do an exercise program to help lose weight    Short Term Goals:  Time Frame: 4 weeks  1) Patient to report 25-50% decrease in bilateral hip and low back pain to return to sleeping in her bed again with little to no soreness in the AM.  2) Patient to demonstrate full lumbar range without pain for ease with dressing  3) Patient to demonstrate abdominal bracing with no more than 1-2 cues for increased lumbar stability with all lifting at home  4) Patient to demonstrate 4+-5/5 strength in bilateral legs for ease with doing work around home  5) Patient to demonstrate continued neutral pelvis and sacral positions to decrease stress and strain on hips and low back for caring for children      Long Term Goals:  Time Frame: 12 weeks  1)Patient to be independent with home program to perform all daily activity with minimal to no pain. Patient Education:   [x]  HEP/Education Completed:  Start strengthening and stretching given above for HEP.  [employer] Education Access Code:  []  No new Education completed  []  Reviewed Prior HEP      [x]  Patient verbalized and/or demonstrated understanding of education provided. []  Patient unable to verbalize and/or demonstrate understanding of education provided. Will continue education. []  Barriers to learning: None    PLAN:    []  Plan of care initiated. Plan to see patient 2 times per week for 12 weeks to address the treatment planned outlined above.   [x]  Continue with current plan of care  []  Modify plan of care as follows:    []  Hold pending physician visit  []  Discharge    Time In 0745   Time Out 0830   Timed Code Minutes: 45 min   Total Treatment Time: 45 min       Electronically Signed by: [place] [id number]

## 2020-12-30 ENCOUNTER — HOSPITAL ENCOUNTER (OUTPATIENT)
Dept: PHYSICAL THERAPY | Age: 30
Setting detail: THERAPIES SERIES
Discharge: HOME | End: 2020-12-30
Payer: MEDICARE

## 2020-12-30 PROCEDURE — 97110 THERAPEUTIC EXERCISES: CPT

## 2020-12-30 NOTE — PROGRESS NOTES
[address] [facility]  PHYSICAL THERAPY  [] EVALUATION  [x] DAILY NOTE (LAND) [] DAILY NOTE (AQUATIC ) [] PROGRESS NOTE [] DISCHARGE NOTE    [x] OUTPATIENT REHABILITATION [facility]   [] [place] [address]    [] [facility]   [] Amanda Fletcher    Date: 2020  Patient Name:  Bryan Oscar  : 1990  MRN: [id number]  CSN: [id number]    Referring Practitioner EARL Wright*   Diagnosis Pain in right hip [M25.551]    Treatment Diagnosis Bilateral hip pain, low back pain, core and leg weakness, difficulty with ambulation   Date of Evaluation 20    Additional Pertinent History Anxiety disorder, Obesity, Arthritis      Functional Outcome Measure Used Tinetti   Functional Outcome Score  (20)       Insurance: Primary: Payor: Ashley Egan /  /  / ,   Secondary:    Authorization Information: Allowed 30 visits PT per calendar year. Aquatics and modalities except Ionto and HP/CP covered. Telehealth covered. Visit # 3, 3/10 for progress note   Visits Allowed: 30   Recertification Date: 61   Physician Follow-Up: None   Physician Orders: Prefers female therapists   History of Present Illness:      SUBJECTIVE:  Patient states she is feeling about the same today as she did yesterday. Reports she did try some of the exercises at home last night. Patient states she is sleeping in a recliner chair because it is the only way she can get comfortable.     TREATMENT   Precautions: None   Pain: 5/10 hips and lower back    X in shaded column indicates activity completed today   Modalities Parameters/  Location  Notes                     Manual Therapy Time/Technique  Notes   Re-assessed for right on left sacral torsion   No torsion noted today   Re-assessed for left posterior hip rotation    No leg length difference noted today         Exercise/Intervention   Notes   Seated:  Abdominal bracing                Shoulder Rows                Marching LAQ                Adduction squeezes                Abduction (Orange and green band together) 5 seconds  5 seconds    5 seconds  5 seconds 10x  10x  10x  10x  10x  10x X  X  X  X  X  X Patient reported some tingling in toes and numbness in left calf with exercises             Reviewed proper sitting posture on the floor and sitting/standing posture   X    Educated on bed mobility and log rolling technique   X           Semi-reclined: Butterfly stretch                          Hamstring stretch (with towel) 3x 20 seconds X  X   Demonstrated seated and standing positions also for HEP. Standing: Hip flexor stretch 3x 20 seconds X                                                       Specific Interventions Next Treatment: manual therapy to address pelvic obliquities, manual therapy for TP work or muscle tightness, posture education, stretches for piriformis/HS/Psoas, progress to strengthening for core. Modalities or pool therapy as needed. Activity/Treatment Tolerance:  [x]  Patient tolerated treatment well  []  Patient limited by fatigue  []  Patient limited by pain   []  Patient limited by medical complications  []  Other:     Assessment:  Discussed importance of core engagement with exercises and daily activities. Patient with limited tolerance for supine and semi-reclined position. Demonstrated hamstring stretch in supine, seated, and standing positions. Attempted to initiate piriformis stretch but patient unable to tolerate in seated position. Moderate tightness noted at bilateral hamstrings. Encouraged patient to use heat and ice as needed for pain control and to relax muscles. Pain rated 5-6/10 at end of session.      GOALS:  Patient Goal: To help get pain under control or abolish it so that she can do an exercise program to help lose weight    Short Term Goals:  Time Frame: 4 weeks  1) Patient to report 25-50% decrease in bilateral hip and low back pain to return to sleeping in her bed again with little to no soreness in the AM.  2) Patient to demonstrate full lumbar range without pain for ease with dressing  3) Patient to demonstrate abdominal bracing with no more than 1-2 cues for increased lumbar stability with all lifting at home  4) Patient to demonstrate 4+-5/5 strength in bilateral legs for ease with doing work around home  5) Patient to demonstrate continued neutral pelvis and sacral positions to decrease stress and strain on hips and low back for caring for children      Long Term Goals:  Time Frame: 12 weeks  1)Patient to be independent with home program to perform all daily activity with minimal to no pain. Patient Education:   [x]  HEP/Education Completed:  Continue HEP, use of heat/ice, added hamstring stretch with handout provided, abdominal bracing with activities. [employer] Access Code:  [email or web address]  []  No new Education completed  []  Reviewed Prior HEP      [x]  Patient verbalized and/or demonstrated understanding of education provided. []  Patient unable to verbalize and/or demonstrate understanding of education provided. Will continue education. []  Barriers to learning: None    PLAN:    []  Plan of care initiated. Plan to see patient 2 times per week for 12 weeks to address the treatment planned outlined above.   [x]  Continue with current plan of care  []  Modify plan of care as follows:    []  Hold pending physician visit  []  Discharge    Time In 0815   Time Out 0900   Timed Code Minutes: 45 min   Total Treatment Time: 45 min       Electronically Signed by: Roxana Russo

## 2021-01-05 ENCOUNTER — HOSPITAL ENCOUNTER (OUTPATIENT)
Dept: PHYSICAL THERAPY | Age: 31
Setting detail: THERAPIES SERIES
Discharge: HOME | End: 2021-01-05
Payer: MEDICARE

## 2021-01-05 PROCEDURE — 97140 MANUAL THERAPY 1/> REGIONS: CPT

## 2021-01-05 PROCEDURE — 97110 THERAPEUTIC EXERCISES: CPT

## 2021-01-05 NOTE — PROGRESS NOTES
and lower back. Started at 3/10 before got moving this morning. X in shaded column indicates activity completed today   Modalities Parameters/  Location  Notes                     Manual Therapy Time/Technique  Notes   Re-assessed for right on left sacral torsion   No torsion noted today   Re-assessed for left posterior hip rotation    No leg length difference noted today   Psoas release in semi-reclined position at 45 and 90 degree angles  X Patient with increased tightness especially at 90 degree angle   Exercise/Intervention   Notes   Seated:  Abdominal bracing                Shoulder Rows                Marching                LAQ                Adduction squeezes                Abduction with purple band 5 seconds  5 seconds    5 seconds  5 seconds 10x  10x  10x  10x  10x  10x X  X  X  X  X  X           Pt not feeling much with sitting so moved to standing   Reviewed proper sitting posture on the floor and sitting/standing posture       Educated on bed mobility and log rolling technique              Semi-reclined: Butterfly stretch                          Hamstring stretch (with towel) 3x 20 seconds      Demonstrated seated and standing positions also for HEP. Standing: Hip flexor stretch 3x 20 seconds X Start standing HS stretch soon   Standing: 3-way hip with purple band 10x  X Trialed several positions and placement of the thera-band and this gave her the best feeling that she was working something                                               Specific Interventions Next Treatment: manual therapy to address pelvic obliquities, manual therapy for TP work or muscle tightness, posture education, stretches for piriformis/HS/Psoas, progress to strengthening for core. Modalities or pool therapy as needed.     Activity/Treatment Tolerance:  [x]  Patient tolerated treatment well  []  Patient limited by fatigue  []  Patient limited by pain   []  Patient limited by medical complications  []  Other: Assessment:  Patient with increased tightness and tenderness in right psoas today which could be the reason for some of her remaining pain and tightness with butterfly stretch. Start standing 3-way hip for core and leg strengthening - pt reported some calf and HS cramping after and could have been due to trying to stabilize with muscles against resistance. Patient slowly improving and making gains with less pain and easier time with mobility. GOALS:  Patient Goal: To help get pain under control or abolish it so that she can do an exercise program to help lose weight    Short Term Goals:  Time Frame: 4 weeks  1) Patient to report 25-50% decrease in bilateral hip and low back pain to return to sleeping in her bed again with little to no soreness in the AM.  2) Patient to demonstrate full lumbar range without pain for ease with dressing  3) Patient to demonstrate abdominal bracing with no more than 1-2 cues for increased lumbar stability with all lifting at home  4) Patient to demonstrate 4+-5/5 strength in bilateral legs for ease with doing work around home  5) Patient to demonstrate continued neutral pelvis and sacral positions to decrease stress and strain on hips and low back for caring for children      Long Term Goals:  Time Frame: 12 weeks  1)Patient to be independent with home program to perform all daily activity with minimal to no pain. Patient Education:   [x]  HEP/Education Completed:  Continue HEP above and add in 3-way hip (gave purple band for home). [employer] Access Code:  [email or web address]  []  No new Education completed  [x]  Reviewed Prior HEP      [x]  Patient verbalized and/or demonstrated understanding of education provided. []  Patient unable to verbalize and/or demonstrate understanding of education provided. Will continue education. []  Barriers to learning: None    PLAN:    []  Plan of care initiated.   Plan to see patient 2 times per week for 12 weeks to address the treatment planned outlined above.   [x]  Continue with current plan of care  []  Modify plan of care as follows:    []  Hold pending physician visit  []  Discharge    Time In 0746   Time Out 0830   Timed Code Minutes: 44 min   Total Treatment Time: 44 min       Electronically Signed by: [place] [id number]

## 2021-01-07 DIAGNOSIS — G43.109 MIGRAINE WITH AURA AND WITHOUT STATUS MIGRAINOSUS, NOT INTRACTABLE: ICD-10-CM

## 2021-01-07 RX ORDER — SUMATRIPTAN 50 MG/1
TABLET, FILM COATED ORAL
Qty: 9 TABLET | Refills: 1 | Status: SHIPPED | OUTPATIENT
Start: 2021-01-07 | End: 2021-03-04

## 2021-01-07 NOTE — TELEPHONE ENCOUNTER
Patient's last appointment was : 12/8/2020  Patient's next appointment is : Visit date not found  Last refilled:  11/18/2020

## 2021-01-08 ENCOUNTER — HOSPITAL ENCOUNTER (OUTPATIENT)
Dept: PHYSICAL THERAPY | Age: 31
Setting detail: THERAPIES SERIES
Discharge: HOME | End: 2021-01-08
Payer: MEDICARE

## 2021-01-08 PROCEDURE — 97110 THERAPEUTIC EXERCISES: CPT

## 2021-01-08 NOTE — PROGRESS NOTES
[address] [facility]  PHYSICAL THERAPY  [] EVALUATION  [x] DAILY NOTE (LAND) [] DAILY NOTE (AQUATIC ) [] PROGRESS NOTE [] DISCHARGE NOTE    [x] OUTPATIENT REHABILITATION [facility]   [] [place] [address]    [] [facility]   [] Jeannine Mcgill    Date: 2021  Patient Name:  Azul Collins  : 1990  MRN: [id number]  CSN: [id number]    Referring Practitioner EARL Barrera*   Diagnosis Pain in right hip [M25.551]    Treatment Diagnosis Bilateral hip pain, low back pain, core and leg weakness, difficulty with ambulation   Date of Evaluation 20    Additional Pertinent History Anxiety disorder, Obesity, Arthritis      Functional Outcome Measure Used Tinetti   Functional Outcome Score  (20)       Insurance: Primary: Payor: Aria Flores /  /  / ,   Secondary:    Authorization Information: Allowed 30 visits PT per calendar year. Aquatics and modalities except Ionto and HP/CP covered. Telehealth covered. Visit # 5, 5/10 for progress note   Visits Allowed:    Recertification Date:    Physician Follow-Up: None   Physician Orders: Prefers female therapists   History of Present Illness:      SUBJECTIVE:  Patient reports she wasn't sure if the psoas release made much change. Patient feels like therapy is helping but wishes she would notice a change more quickly. Patient states she is a little sore today. Is completing home program but had to go back down to the green band because it was hurting with purple band       TREATMENT   Precautions: None   Pain: 5/10 hips and lower back.      X in shaded column indicates activity completed today   Modalities Parameters/  Location  Notes                     Manual Therapy Time/Technique  Notes   Re-assessed for right on left sacral torsion   No torsion noted today   Re-assessed for left posterior hip rotation    No leg length difference noted today   Psoas release in semi-reclined position at 45 Goals: Time Frame: 4 weeks  1) Patient to report 25-50% decrease in bilateral hip and low back pain to return to sleeping in her bed again with little to no soreness in the AM.  2) Patient to demonstrate full lumbar range without pain for ease with dressing  3) Patient to demonstrate abdominal bracing with no more than 1-2 cues for increased lumbar stability with all lifting at home  4) Patient to demonstrate 4+-5/5 strength in bilateral legs for ease with doing work around home  5) Patient to demonstrate continued neutral pelvis and sacral positions to decrease stress and strain on hips and low back for caring for children      Long Term Goals:  Time Frame: 12 weeks  1)Patient to be independent with home program to perform all daily activity with minimal to no pain. Patient Education:   [x]  HEP/Education Completed:  Continue HEP, standing adductor stretch and hamstring stretch   [facility]:  [email or web address]  []  No new Education completed  [x]  Reviewed Prior HEP      [x]  Patient verbalized and/or demonstrated understanding of education provided. []  Patient unable to verbalize and/or demonstrate understanding of education provided. Will continue education. []  Barriers to learning: None    PLAN:    []  Plan of care initiated. Plan to see patient 2 times per week for 12 weeks to address the treatment planned outlined above.   [x]  Continue with current plan of care  []  Modify plan of care as follows:    []  Hold pending physician visit  []  Discharge    Time In 0830   Time Out 0915   Timed Code Minutes: 45 min   Total Treatment Time: 45 min       Electronically Signed by: Radha Hernandez

## 2021-01-12 ENCOUNTER — HOSPITAL ENCOUNTER (OUTPATIENT)
Dept: PHYSICAL THERAPY | Age: 31
Setting detail: THERAPIES SERIES
Discharge: HOME | End: 2021-01-12
Payer: MEDICARE

## 2021-01-12 PROCEDURE — 97140 MANUAL THERAPY 1/> REGIONS: CPT

## 2021-01-12 PROCEDURE — 97110 THERAPEUTIC EXERCISES: CPT

## 2021-01-12 NOTE — PROGRESS NOTES
Parameters/  Location  Notes                     Manual Therapy Time/Technique  Notes   Re-assessed for right on left sacral torsion   No torsion noted today   Re-assessed for left posterior hip rotation    No leg length difference noted today   Psoas release in semi-reclined position at 45 and 90 degree angles - bilat sides  X Patient with increased tightness at both angles for left side today. Still tightness in right side but not as much. Exercise/Intervention   Notes   Seated:  Abdominal bracing                Shoulder Rows                Marching                LAQ                Adduction squeezes                Abduction with purple band 5 seconds  5 seconds    5 seconds  5 seconds 10x  10x  10x  10x  10x  10x                      Pt not feeling much with sitting so moved to standing   Reviewed proper sitting posture on the floor and sitting/standing posture       Educated on bed mobility and log rolling technique              Semi-reclined: Butterfly stretch                          Hamstring stretch (with towel) 3x 20 seconds X   Patient able to do with increased range today and pulling only more towards end range on right. Standing: Hip flexor stretch (lunge at wall), adductor stretch (lateral lunge), hamstring stretch at steps 3x Bilateral  20 seconds X Re-educated on hows to do lateral lunge stretch using a chair   Standing: 3-way hip with purple band (band at ankles) 10x      Added in standing: heel/toe raises, marching and squats 10x each  X    Next session add in lunges and step-ups and possible arm strengthening if able to tolerate                                     Specific Interventions Next Treatment: manual therapy to address pelvic obliquities, manual therapy for TP work or muscle tightness, posture education, stretches for piriformis/HS/Psoas, progress to strengthening for core. Modalities or pool therapy as needed.     Activity/Treatment Tolerance:  [x]  Patient tolerated treatment well  []  Patient limited by fatigue  []  Patient limited by pain   []  Patient limited by medical complications  []  Other:     Assessment:  Patient has made great progress with therapy in just a few sessions. Her pain is less, her mobility is better and easier to do and her range has improved with less pain. She can lay flat for longer periods of time but is getting better. She is doing well with exercises and she is less sore and able to maintain better. She is worried about using up too many therapy sessions so she is going to be on hold for a few weeks to see how she does on her own and then follow up to be progressed or done with therapy depending on how she is feeling. GOALS:  Patient Goal: To help get pain under control or abolish it so that she can do an exercise program to help lose weight    Short Term Goals:  Time Frame: 4 weeks  1) Patient to report 25-50% decrease in bilateral hip and low back pain to return to sleeping in her bed again with little to no soreness in the AM.  [] Goal Met [x] Goal Not Met [x] Continue Goal [] Discontinue Goal  [] Revise Goal  Goal Assessment: She reports pain is 25% better and she has gone back to sleeping in her bed but not every night and still having pain in the AM although not as much. 2) Patient to demonstrate full lumbar range without pain for ease with dressing  [] Goal Met [x] Goal Not Met [x] Continue Goal [] Discontinue Goal  [] Revise Goal  Goal Assessment: Patient with increased range but not full yet  3) Patient to demonstrate abdominal bracing with no more than 1-2 cues for increased lumbar stability with all lifting at home  [x] Goal Met [] Goal Not Met [] Continue Goal [] Discontinue Goal  [x] Revise Goal  New Goal: Patient to progress strength program without pain increase to perform al activity at home with little to no pain.   4) Patient to demonstrate 4+-5/5 strength in bilateral legs for ease with doing work around home  [] Goal Met [x] Goal Not Met [x] Continue Goal [] Discontinue Goal  [] Revise Goal  Goal Assessment: Patient with 4 to 4+/5 throughout legs  5) Patient to demonstrate continued neutral pelvis and sacral positions to decrease stress and strain on hips and low back for caring for children  [] Goal Met [x] Goal Not Met [x] Continue Goal [] Discontinue Goal  [] Revise Goal  Goal Assessment:  Patient has been doing well with and reports hips not going out on her but PT would like to have her stabilize for a few more weeks to make sure is staying long term      Long Term Goals:  Time Frame: 12 weeks  1)Patient to be independent with home program to perform all daily activity with minimal to no pain. [] Goal Met [x] Goal Not Met [x] Continue Goal [] Discontinue Goal  [] Revise Goal  Goal Assessment: Patient still working on HEP      Patient Education:   [x]  HEP/Education Completed:  Continue with HEP, add in new activity given above and call with any questions   [employer] Access Code:  [email or web address]  []  No new Education completed  [x]  Reviewed Prior HEP      [x]  Patient verbalized and/or demonstrated understanding of education provided. []  Patient unable to verbalize and/or demonstrate understanding of education provided. Will continue education. []  Barriers to learning: None    PLAN:    []  Plan of care initiated. Plan to see patient 2 times per week for 12 weeks to address the treatment planned outlined above. []  Continue with current plan of care  [x]  Modify plan of care as follows:  On hold for 3 weeks then follow up with PT   []  Hold pending physician visit  []  Discharge    Time In 0803   Time Out 0900   Timed Code Minutes: 57 min   Total Treatment Time: 57 min       Electronically [place]igned by: [place] [id number]

## 2021-02-02 ENCOUNTER — HOSPITAL ENCOUNTER (OUTPATIENT)
Dept: PHYSICAL THERAPY | Age: 31
Setting detail: THERAPIES SERIES
Discharge: HOME | End: 2021-02-02
Payer: MEDICARE

## 2021-02-02 PROCEDURE — 97110 THERAPEUTIC EXERCISES: CPT

## 2021-02-02 NOTE — DISCHARGE SUMMARY
treatments    Activity/Treatment Tolerance:  [x]  Patient tolerated treatment well  []  Patient limited by fatigue  []  Patient limited by pain   []  Patient limited by medical complications  []  Other:     Assessment:  Patient has made good progression with therapy. Her Tinetti score has increased significantly with improved stability and balance along with more normalized gait. Her pain overall is better but it is not abolished and it fluctuates throughout the day. She is stronger and has better stability in her core. She has not had her hips \"go out\" on her in months and she did have any hip rotation or sacral torsion noted today. She is still having limited range and pain with laying supine and trying to move her right hip into ER. PT concerned that patient may have something more going on with her right hip due to the deep groin pain that she has. Patient will do her HEP consistently for the next 2-4 weeks and if she is still having the increased pain on her right side then she may need more follow up or testing. GOALS:  Patient Goal: To help get pain under control or abolish it so that she can do an exercise program to help lose weight    Short Term Goals:  Time Frame: 4 weeks  1) Patient to report 25-50% decrease in bilateral hip and low back pain to return to sleeping in her bed again with little to no soreness in the AM.  [] Goal Met [x] Goal Not Met [] Continue Goal [x] Discontinue Goal  [] Revise Goal  Goal Assessment: She reports pain is still just 25% better and she has gone back to sleeping in her bed but not every night and still having pain in the AM although not as much. 2) Patient to demonstrate full lumbar range without pain for ease with dressing  [] Goal Met [x] Goal Not Met [] Continue Goal [x] Discontinue Goal  [] Revise Goal  Goal Assessment: Patient with increased range but not full yet - still limited with right hip ER.   3) New Goal: Patient to progress strength program without pain follows:  On hold for 3 weeks then follow up with PT   []  Hold pending physician visit  [x]  Discharge    Time In 0700   Time Out 0800   Timed Code Minutes: 60 min   Total Treatment Time: 60 min       Electronically Signed by: [place] [id number]

## 2021-03-01 DIAGNOSIS — G43.109 MIGRAINE WITH AURA AND WITHOUT STATUS MIGRAINOSUS, NOT INTRACTABLE: ICD-10-CM

## 2021-03-01 RX ORDER — TOPIRAMATE 25 MG/1
TABLET ORAL
Qty: 120 TABLET | Refills: 5 | Status: SHIPPED | OUTPATIENT
Start: 2021-03-01 | End: 2021-07-27

## 2021-03-01 NOTE — TELEPHONE ENCOUNTER
Patient's last appointment was : 12/8/2020  Patient's next appointment is : Visit date not found  Last refilled: 9/12/2020

## 2021-03-03 DIAGNOSIS — G43.109 MIGRAINE WITH AURA AND WITHOUT STATUS MIGRAINOSUS, NOT INTRACTABLE: ICD-10-CM

## 2021-03-03 NOTE — TELEPHONE ENCOUNTER
Last visit- 12/8/2020  Next visit- Visit date not found    Requested Prescriptions     Pending Prescriptions Disp Refills    SUMAtriptan (IMITREX) 50 MG tablet [Pharmacy Med Name: SUMATRIPTAN SUCC 50 MG TABLET] 9 tablet 1     Sig: take 1 tablet by mouth if needed AT ONSET OF HEADACHE may repeat in 2 hours IF headache PERSISTS maximum daily dose of 2 tablets ( 100 milligrams ) every 24 hours

## 2021-03-04 RX ORDER — SUMATRIPTAN 50 MG/1
TABLET, FILM COATED ORAL
Qty: 9 TABLET | Refills: 1 | Status: SHIPPED | OUTPATIENT
Start: 2021-03-04 | End: 2021-04-27

## 2021-03-04 NOTE — TELEPHONE ENCOUNTER
Can we see her twice a year for migraines - once for a wellness and once for the migraines only? Thanks!

## 2021-03-27 DIAGNOSIS — G43.821 MENSTRUAL MIGRAINE WITH STATUS MIGRAINOSUS, NOT INTRACTABLE: ICD-10-CM

## 2021-03-27 DIAGNOSIS — N92.6 IRREGULAR MENSTRUATION: ICD-10-CM

## 2021-03-29 RX ORDER — LEVONORGESTREL AND ETHINYL ESTRADIOL 0.15-0.03
KIT ORAL
Qty: 112 TABLET | Refills: 1 | Status: SHIPPED | OUTPATIENT
Start: 2021-03-29 | End: 2021-11-26 | Stop reason: SDUPTHER

## 2021-03-29 NOTE — TELEPHONE ENCOUNTER
Last visit- 12/8/2020  Next visit- Visit date not found    Requested Prescriptions     Pending Prescriptions Disp Refills    levonorgestrel-ethinyl estradiol (NORDETTE) 0.15-30 MG-MCG per tablet [Pharmacy Med Name: LEVONOR-ETH ESTRAD 0.15-0.03] 112 tablet      Sig: take 1 tablet by mouth once daily     Please review, approve or deny

## 2021-04-23 DIAGNOSIS — G43.109 MIGRAINE WITH AURA AND WITHOUT STATUS MIGRAINOSUS, NOT INTRACTABLE: ICD-10-CM

## 2021-04-26 NOTE — TELEPHONE ENCOUNTER
Patient's last appointment was : 12/8/2020  Patient's next appointment is : Visit date not found  Last refilled:3/4/2021

## 2021-04-27 RX ORDER — SUMATRIPTAN 50 MG/1
TABLET, FILM COATED ORAL
Qty: 9 TABLET | Refills: 1 | Status: SHIPPED | OUTPATIENT
Start: 2021-04-27 | End: 2021-08-09

## 2021-05-18 DIAGNOSIS — J01.01 ACUTE RECURRENT MAXILLARY SINUSITIS: ICD-10-CM

## 2021-05-18 NOTE — TELEPHONE ENCOUNTER
Patient's last appointment was : 12/8/2020  Patient's next appointment is : Visit date not found  Last refilled: 12/4/20

## 2021-05-20 DIAGNOSIS — E55.9 VITAMIN D DEFICIENCY: ICD-10-CM

## 2021-05-20 RX ORDER — CHOLECALCIFEROL (VITAMIN D3) 125 MCG
CAPSULE ORAL
Qty: 30 CAPSULE | Refills: 5 | Status: SHIPPED | OUTPATIENT
Start: 2021-05-20 | End: 2021-12-14 | Stop reason: SDUPTHER

## 2021-05-20 RX ORDER — MONTELUKAST SODIUM 10 MG/1
TABLET ORAL
Qty: 180 TABLET | Refills: 0 | Status: SHIPPED | OUTPATIENT
Start: 2021-05-20 | End: 2021-07-27 | Stop reason: SDUPTHER

## 2021-05-20 NOTE — TELEPHONE ENCOUNTER
Patient's last appointment was : 12/8/2020  Patient's next appointment is : Visit date not found  Last refilled:  12/4/2020

## 2021-07-27 ENCOUNTER — OFFICE VISIT (OUTPATIENT)
Dept: FAMILY MEDICINE CLINIC | Age: 31
End: 2021-07-27
Payer: MEDICARE

## 2021-07-27 ENCOUNTER — TELEPHONE (OUTPATIENT)
Dept: FAMILY MEDICINE CLINIC | Age: 31
End: 2021-07-27

## 2021-07-27 VITALS
HEIGHT: 60 IN | WEIGHT: 293 LBS | OXYGEN SATURATION: 98 % | HEART RATE: 94 BPM | SYSTOLIC BLOOD PRESSURE: 118 MMHG | DIASTOLIC BLOOD PRESSURE: 82 MMHG | TEMPERATURE: 98.1 F | RESPIRATION RATE: 12 BRPM | BODY MASS INDEX: 57.52 KG/M2

## 2021-07-27 DIAGNOSIS — F41.9 ANXIETY: ICD-10-CM

## 2021-07-27 DIAGNOSIS — J06.9 VIRAL URI: ICD-10-CM

## 2021-07-27 DIAGNOSIS — N30.01 ACUTE CYSTITIS WITH HEMATURIA: Primary | ICD-10-CM

## 2021-07-27 DIAGNOSIS — F33.1 MODERATE EPISODE OF RECURRENT MAJOR DEPRESSIVE DISORDER (HCC): ICD-10-CM

## 2021-07-27 DIAGNOSIS — J01.01 ACUTE RECURRENT MAXILLARY SINUSITIS: ICD-10-CM

## 2021-07-27 DIAGNOSIS — G43.109 MIGRAINE WITH AURA AND WITHOUT STATUS MIGRAINOSUS, NOT INTRACTABLE: ICD-10-CM

## 2021-07-27 DIAGNOSIS — Z13.31 POSITIVE DEPRESSION SCREENING: ICD-10-CM

## 2021-07-27 LAB
BILIRUBIN URINE: NEGATIVE
BLOOD URINE, POC: ABNORMAL
CHARACTER, URINE: CLEAR
COLOR, URINE: YELLOW
GLUCOSE URINE: NEGATIVE MG/DL
KETONES, URINE: NEGATIVE
LEUKOCYTE CLUMPS, URINE: ABNORMAL
NITRITE, URINE: NEGATIVE
PH, URINE: 6.5 (ref 5–9)
PROTEIN, URINE: NEGATIVE MG/DL
SPECIFIC GRAVITY, URINE: 1.01 (ref 1–1.03)
UROBILINOGEN, URINE: 0.2 EU/DL (ref 0–1)

## 2021-07-27 PROCEDURE — G8417 CALC BMI ABV UP PARAM F/U: HCPCS | Performed by: STUDENT IN AN ORGANIZED HEALTH CARE EDUCATION/TRAINING PROGRAM

## 2021-07-27 PROCEDURE — G8431 POS CLIN DEPRES SCRN F/U DOC: HCPCS | Performed by: STUDENT IN AN ORGANIZED HEALTH CARE EDUCATION/TRAINING PROGRAM

## 2021-07-27 PROCEDURE — 99214 OFFICE O/P EST MOD 30 MIN: CPT | Performed by: STUDENT IN AN ORGANIZED HEALTH CARE EDUCATION/TRAINING PROGRAM

## 2021-07-27 PROCEDURE — 1036F TOBACCO NON-USER: CPT | Performed by: STUDENT IN AN ORGANIZED HEALTH CARE EDUCATION/TRAINING PROGRAM

## 2021-07-27 PROCEDURE — G8427 DOCREV CUR MEDS BY ELIG CLIN: HCPCS | Performed by: STUDENT IN AN ORGANIZED HEALTH CARE EDUCATION/TRAINING PROGRAM

## 2021-07-27 PROCEDURE — 81003 URINALYSIS AUTO W/O SCOPE: CPT | Performed by: STUDENT IN AN ORGANIZED HEALTH CARE EDUCATION/TRAINING PROGRAM

## 2021-07-27 RX ORDER — LORATADINE 10 MG/1
10 TABLET ORAL DAILY
Qty: 30 TABLET | Refills: 0 | Status: SHIPPED | OUTPATIENT
Start: 2021-07-27 | End: 2021-12-02 | Stop reason: ALTCHOICE

## 2021-07-27 RX ORDER — GREEN TEA/HOODIA GORDONII 315-12.5MG
1 CAPSULE ORAL DAILY
Qty: 30 TABLET | Refills: 0 | Status: SHIPPED | OUTPATIENT
Start: 2021-07-27 | End: 2021-08-26

## 2021-07-27 RX ORDER — NITROFURANTOIN 25; 75 MG/1; MG/1
100 CAPSULE ORAL 2 TIMES DAILY
Qty: 10 CAPSULE | Refills: 0 | Status: SHIPPED | OUTPATIENT
Start: 2021-07-27 | End: 2021-08-01

## 2021-07-27 RX ORDER — MONTELUKAST SODIUM 10 MG/1
TABLET ORAL
Qty: 180 TABLET | Refills: 1 | Status: SHIPPED | OUTPATIENT
Start: 2021-07-27 | End: 2021-12-14 | Stop reason: SDUPTHER

## 2021-07-27 RX ORDER — TOPIRAMATE 50 MG/1
TABLET, FILM COATED ORAL
Qty: 60 TABLET | Refills: 1 | Status: SHIPPED | OUTPATIENT
Start: 2021-07-27 | End: 2021-12-14 | Stop reason: SDUPTHER

## 2021-07-27 RX ORDER — FLUTICASONE PROPIONATE 50 MCG
SPRAY, SUSPENSION (ML) NASAL
Qty: 1 BOTTLE | Refills: 1 | Status: SHIPPED | OUTPATIENT
Start: 2021-07-27 | End: 2021-12-14 | Stop reason: SDUPTHER

## 2021-07-27 RX ORDER — FLUOXETINE HYDROCHLORIDE 20 MG/1
40 CAPSULE ORAL DAILY
Qty: 60 CAPSULE | Refills: 3 | Status: SHIPPED | OUTPATIENT
Start: 2021-07-27 | End: 2021-12-14 | Stop reason: SDUPTHER

## 2021-07-27 SDOH — ECONOMIC STABILITY: FOOD INSECURITY: WITHIN THE PAST 12 MONTHS, YOU WORRIED THAT YOUR FOOD WOULD RUN OUT BEFORE YOU GOT MONEY TO BUY MORE.: NEVER TRUE

## 2021-07-27 SDOH — ECONOMIC STABILITY: FOOD INSECURITY: WITHIN THE PAST 12 MONTHS, THE FOOD YOU BOUGHT JUST DIDN'T LAST AND YOU DIDN'T HAVE MONEY TO GET MORE.: NEVER TRUE

## 2021-07-27 ASSESSMENT — COLUMBIA-SUICIDE SEVERITY RATING SCALE - C-SSRS
2. HAVE YOU ACTUALLY HAD ANY THOUGHTS OF KILLING YOURSELF?: NO
6. HAVE YOU EVER DONE ANYTHING, STARTED TO DO ANYTHING, OR PREPARED TO DO ANYTHING TO END YOUR LIFE?: NO
1. WITHIN THE PAST MONTH, HAVE YOU WISHED YOU WERE DEAD OR WISHED YOU COULD GO TO SLEEP AND NOT WAKE UP?: NO

## 2021-07-27 ASSESSMENT — ENCOUNTER SYMPTOMS
SORE THROAT: 0
NAUSEA: 0
BACK PAIN: 0
DIARRHEA: 0
ABDOMINAL PAIN: 0
COUGH: 0
SHORTNESS OF BREATH: 0
RHINORRHEA: 0
VOMITING: 0
CONSTIPATION: 0

## 2021-07-27 ASSESSMENT — PATIENT HEALTH QUESTIONNAIRE - PHQ9
4. FEELING TIRED OR HAVING LITTLE ENERGY: 2
9. THOUGHTS THAT YOU WOULD BE BETTER OFF DEAD, OR OF HURTING YOURSELF: 0
8. MOVING OR SPEAKING SO SLOWLY THAT OTHER PEOPLE COULD HAVE NOTICED. OR THE OPPOSITE, BEING SO FIGETY OR RESTLESS THAT YOU HAVE BEEN MOVING AROUND A LOT MORE THAN USUAL: 0
2. FEELING DOWN, DEPRESSED OR HOPELESS: 2
SUM OF ALL RESPONSES TO PHQ QUESTIONS 1-9: 12
7. TROUBLE CONCENTRATING ON THINGS, SUCH AS READING THE NEWSPAPER OR WATCHING TELEVISION: 0
SUM OF ALL RESPONSES TO PHQ QUESTIONS 1-9: 12
6. FEELING BAD ABOUT YOURSELF - OR THAT YOU ARE A FAILURE OR HAVE LET YOURSELF OR YOUR FAMILY DOWN: 2
3. TROUBLE FALLING OR STAYING ASLEEP: 2
SUM OF ALL RESPONSES TO PHQ QUESTIONS 1-9: 12
1. LITTLE INTEREST OR PLEASURE IN DOING THINGS: 2
SUM OF ALL RESPONSES TO PHQ9 QUESTIONS 1 & 2: 4
10. IF YOU CHECKED OFF ANY PROBLEMS, HOW DIFFICULT HAVE THESE PROBLEMS MADE IT FOR YOU TO DO YOUR WORK, TAKE CARE OF THINGS AT HOME, OR GET ALONG WITH OTHER PEOPLE: 1
5. POOR APPETITE OR OVEREATING: 2

## 2021-07-27 ASSESSMENT — SOCIAL DETERMINANTS OF HEALTH (SDOH): HOW HARD IS IT FOR YOU TO PAY FOR THE VERY BASICS LIKE FOOD, HOUSING, MEDICAL CARE, AND HEATING?: NOT HARD AT ALL

## 2021-07-27 NOTE — PROGRESS NOTES
nostril once daily 1 Bottle 1    loratadine (CLARITIN) 10 MG tablet Take 1 tablet by mouth daily 30 tablet 0    montelukast (SINGULAIR) 10 MG tablet take 1 tablet by mouth once daily 180 tablet 1    nitrofurantoin, macrocrystal-monohydrate, (MACROBID) 100 MG capsule Take 1 capsule by mouth 2 times daily for 5 days 10 capsule 0    FLUoxetine (PROZAC) 20 MG capsule Take 2 capsules by mouth daily 60 capsule 3    topiramate (TOPAMAX) 50 MG tablet take 1 tablet by mouth at bedtime.  60 tablet 1    Probiotic Acidophilus (FLORANEX) TABS Take 1 tablet by mouth daily 30 tablet 0    RA VITAMIN D-3 125 MCG (5000 UT) CAPS capsule take 1 capsule by mouth once daily 30 capsule 5    levonorgestrel-ethinyl estradiol (NORDETTE) 0.15-30 MG-MCG per tablet take 1 tablet by mouth once daily 112 tablet 1    guaiFENesin (ROBITUSSIN) 100 MG/5ML liquid Take 10 mLs by mouth 3 times daily as needed for Cough 1 Bottle 0    Omega-3 Fatty Acids (RA FISH OIL) 1000 MG CAPS take 1 capsule by mouth three times a day 90 capsule 5    busPIRone (BUSPAR) 5 MG tablet Take 1 tablet by mouth 3 times daily as needed (anxiety) 90 tablet 5    Cinnamon 500 MG CAPS Take four times daily before meals and at bedtime 120 capsule 5    Vitamins-Lipotropics (BALANCED B-100 COMPLEX CR) TBCR Take 1 tablet by mouth 4 times daily (after meals and at bedtime) 120 tablet 5    Phenylephrine-DM-GG (MUCINEX FAST-MAX CONGEST COUGH) 5- MG TABS Take by mouth      Saline 0.2 % SOLN by Nasal route      guaiFENesin (MUCINEX) 600 MG extended release tablet Take 1 tablet by mouth 2 times daily 20 tablet 1    Elastic Bandages & Supports (B & B ELASTIC ANKLE BRACE) MISC Wear brace when up walking during the day 1 each 0    SUMAtriptan (IMITREX) 50 MG tablet take 1 tablet by mouth if needed AT ONSET OF HEADACHE may repeat in 2 hours IF headache PERSISTS maximum daily dose of 2 tablets ( 100 milligrams ) every 24 hours (Patient not taking: Reported on 7/27/2021) 9 tablet 1     No current facility-administered medications for this visit. Food Insecurity: No Food Insecurity    Worried About Running Out of Food in the Last Year: Never true    Ran Out of Food in the Last Year: Never true       Health Maintenance   Topic Date Due    Hepatitis C screen  Never done    Varicella vaccine (1 of 2 - 2-dose childhood series) Never done    COVID-19 Vaccine (1) Never done    DTaP/Tdap/Td vaccine (1 - Tdap) Never done    Flu vaccine (1) 09/01/2021    Cervical cancer screen  05/21/2022    HIV screen  Completed    Hepatitis A vaccine  Aged Out    Hepatitis B vaccine  Aged Out    Hib vaccine  Aged Out    Meningococcal (ACWY) vaccine  Aged Out    Pneumococcal 0-64 years Vaccine  Aged Out       ROS:      Review of Systems   Constitutional: Negative for chills, fatigue and fever. HENT: Negative for congestion, rhinorrhea and sore throat. Eyes: Negative for visual disturbance. Respiratory: Negative for cough and shortness of breath. Cardiovascular: Negative for chest pain and palpitations. Gastrointestinal: Negative for abdominal pain, constipation, diarrhea, nausea and vomiting. Genitourinary: Positive for decreased urine volume, difficulty urinating, dysuria, frequency and urgency. Negative for flank pain, menstrual problem, vaginal bleeding, vaginal discharge and vaginal pain. Musculoskeletal: Negative for arthralgias and back pain. Skin: Negative for rash. Neurological: Negative for dizziness, weakness, light-headedness, numbness and headaches. Psychiatric/Behavioral: Positive for dysphoric mood. Negative for decreased concentration and sleep disturbance. The patient is nervous/anxious.           Objective:     Vitals:    07/27/21 1443   BP: 118/82   Site: Left Lower Arm   Position: Sitting   Cuff Size: Large Adult   Pulse: 94   Resp: 12   Temp: 98.1 °F (36.7 °C)   TempSrc: Oral   SpO2: 98%   Weight: (!) 312 lb 6.4 oz (141.7 kg)   Height: 5' (1.524 m)       Body mass index is 61.01 kg/m². Wt Readings from Last 3 Encounters:   07/27/21 (!) 312 lb 6.4 oz (141.7 kg)   01/09/20 (!) 300 lb 9.6 oz (136.4 kg)   09/22/19 290 lb (131.5 kg)     BP Readings from Last 3 Encounters:   07/27/21 118/82   01/09/20 126/74   09/22/19 (!) 164/99       Physical Exam  Vitals and nursing note reviewed. Constitutional:       Appearance: Normal appearance. HENT:      Head: Normocephalic and atraumatic. Right Ear: External ear normal.      Left Ear: External ear normal.      Nose: Nose normal.      Mouth/Throat:      Mouth: Mucous membranes are moist.   Eyes:      Conjunctiva/sclera: Conjunctivae normal.   Cardiovascular:      Rate and Rhythm: Normal rate and regular rhythm. Pulses: Normal pulses. Heart sounds: Normal heart sounds. No murmur heard. Pulmonary:      Effort: Pulmonary effort is normal. No respiratory distress. Breath sounds: Normal breath sounds. No wheezing, rhonchi or rales. Abdominal:      General: Abdomen is flat. Bowel sounds are normal. There is no distension. Palpations: Abdomen is soft. Tenderness: There is no abdominal tenderness. There is no right CVA tenderness or left CVA tenderness. Musculoskeletal:         General: Normal range of motion. Cervical back: Normal range of motion and neck supple. Skin:     General: Skin is warm and dry. Capillary Refill: Capillary refill takes less than 2 seconds. Findings: No rash. Neurological:      General: No focal deficit present. Mental Status: She is alert and oriented to person, place, and time. Psychiatric:         Mood and Affect: Mood normal.         Behavior: Behavior normal.         Assessment / Plan:     1. Acute cystitis with hematuria  - UA (+) for small amount of leukocyte clumps and trace hematuria. Will send for culture. Start Macrobid 100 mg BID x 5 days. Will call and change antibiotic depending on culture and sensitivity.  Continue supportive care. - POCT Urinalysis No Micro (Auto)  - nitrofurantoin, macrocrystal-monohydrate, (MACROBID) 100 MG capsule; Take 1 capsule by mouth 2 times daily for 5 days  Dispense: 10 capsule; Refill: 0  - Culture, Urine    2. Positive depression screening  - Restart Prozac at 20 mg daily x 2 feels, followed by increase to 40 mg daily. Will re-assess in 4-6 weeks. - Positive Screen for Clinical Depression with a Documented Follow-up Plan     3. Moderate episode of recurrent major depressive disorder ([facility] [address]  4. Anxiety  - As above  - FLUoxetine (PROZAC) 20 MG capsule; Take 2 capsules by mouth daily  Dispense: 60 capsule; Refill: 3  - Continue Buspar TID PRN for breakthrough anxiety    5. Migraine with aura and without status migrainosus, not intractable  - Will increase Topamax to 50 mg QHS. Continue Imitrex PRN as abortive therapy. Encouraged to keep headache diary. - topiramate (TOPAMAX) 50 MG tablet; take 1 tablet by mouth at bedtime. Dispense: 60 tablet; Refill: 1    6. Viral URI  - Refilled medications  - loratadine (CLARITIN) 10 MG tablet; Take 1 tablet by mouth daily  Dispense: 30 tablet; Refill: 0    7. Acute recurrent maxillary sinusitis  - Refilled medications  - fluticasone (FLONASE) 50 MCG/ACT nasal spray; instill 1 spray into each nostril once daily  Dispense: 1 Bottle; Refill: 1  - montelukast (SINGULAIR) 10 MG tablet; take 1 tablet by mouth once daily  Dispense: 180 tablet; Refill: 1      Health Maintenance Due   Topic Date Due    Hepatitis C screen  Never done    Varicella vaccine (1 of 2 - 2-dose childhood series) Never done    COVID-19 Vaccine (1) Never done    DTaP/Tdap/Td vaccine (1 - Tdap) Never done           Return in about 6 weeks (around 9/7/2021) for follow up mood and headaches.       Medications Prescribed:  Orders Placed This Encounter   Medications    fluticasone (FLONASE) 50 MCG/ACT nasal spray     Sig: instill 1 spray into each nostril once daily     Dispense:  1 Bottle Refill:  1    loratadine (CLARITIN) 10 MG tablet     Sig: Take 1 tablet by mouth daily     Dispense:  30 tablet     Refill:  0    montelukast (SINGULAIR) 10 MG tablet     Sig: take 1 tablet by mouth once daily     Dispense:  180 tablet     Refill:  1    nitrofurantoin, macrocrystal-monohydrate, (MACROBID) 100 MG capsule     Sig: Take 1 capsule by mouth 2 times daily for 5 days     Dispense:  10 capsule     Refill:  0    FLUoxetine (PROZAC) 20 MG capsule     Sig: Take 2 capsules by mouth daily     Dispense:  60 capsule     Refill:  3    topiramate (TOPAMAX) 50 MG tablet     Sig: take 1 tablet by mouth at bedtime. Dispense:  60 tablet     Refill:  1    Probiotic Acidophilus (FLORANEX) TABS     Sig: Take 1 tablet by mouth daily     Dispense:  30 tablet     Refill:  0       Future Appointments   Date Time Provider Jennifer Gonzalezi   9/7/2021 10:20 AM Selena Leal MD [facility] [place]       Patient given educational materials - see patient instructions. Discussed use, benefit, and side effects of prescribed medications. All patient questions answered. Patient voiced understanding. Reviewed health maintenance. Instructed to continue current medications, diet and exercise. Patient agreed with treatment plan. Follow up as directed.      Electronically signed by Selena Leal MD on 7/27/2021 at 6:26 PM

## 2021-07-27 NOTE — PROGRESS NOTES
Jimi Lambert is a 27 y. o.female    Chief Complaint   Patient presents with    Urinary Tract Infection     started yesterday, taking azo for pain, urgency to urinate, burning    Medication Refill     prozac       Chief complaint, [place], and all pertinent details of the case reviewed with the resident. Please see resident's note for specific details discussed at today's visit. Pt presents with complaints of UTI. Took azo at home, did not help much. Has hx of uti after intercourse. Also anxiety and depression, had been taking prozac which helped maybe minimally but ran out. Would like refill. Also having headaches despite taking low dose topamax. Patient Active Problem List   Diagnosis    Normal delivery    Panic disorder with agoraphobia    Obesity, Class III, BMI 40-49.9 (morbid obesity) ([facility] [address]    Vitamin D deficiency    Acute recurrent maxillary sinusitis    Wheezing symptom    Hypercholesteremia       Current Outpatient Medications   Medication Sig Dispense Refill    fluticasone (FLONASE) 50 MCG/ACT nasal spray instill 1 spray into each nostril once daily 1 Bottle 1    loratadine (CLARITIN) 10 MG tablet Take 1 tablet by mouth daily 30 tablet 0    montelukast (SINGULAIR) 10 MG tablet take 1 tablet by mouth once daily 180 tablet 1    nitrofurantoin, macrocrystal-monohydrate, (MACROBID) 100 MG capsule Take 1 capsule by mouth 2 times daily for 5 days 10 capsule 0    FLUoxetine (PROZAC) 20 MG capsule Take 2 capsules by mouth daily 60 capsule 3    topiramate (TOPAMAX) 50 MG tablet take 1 tablet by mouth at bedtime.  60 tablet 1    Probiotic Acidophilus (FLORANEX) TABS Take 1 tablet by mouth daily 30 tablet 0    RA VITAMIN D-3 125 MCG (5000 UT) CAPS capsule take 1 capsule by mouth once daily 30 capsule 5    levonorgestrel-ethinyl estradiol (NORDETTE) 0.15-30 MG-MCG per tablet take 1 tablet by mouth once daily 112 tablet 1    guaiFENesin (ROBITUSSIN) 100 MG/5ML liquid Take 10 mLs by mouth 3 times daily as needed for Cough 1 Bottle 0    Omega-3 Fatty Acids (RA FISH OIL) 1000 MG CAPS take 1 capsule by mouth three times a day 90 capsule 5    busPIRone (BUSPAR) 5 MG tablet Take 1 tablet by mouth 3 times daily as needed (anxiety) 90 tablet 5    Cinnamon 500 MG CAPS Take four times daily before meals and at bedtime 120 capsule 5    Vitamins-Lipotropics (BALANCED B-100 COMPLEX CR) TBCR Take 1 tablet by mouth 4 times daily (after meals and at bedtime) 120 tablet 5    Phenylephrine-DM-GG (MUCINEX FAST-MAX CONGEST COUGH) 5- MG TABS Take by mouth      Saline 0.2 % SOLN by Nasal route      guaiFENesin (MUCINEX) 600 MG extended release tablet Take 1 tablet by mouth 2 times daily 20 tablet 1    Elastic Bandages & Supports (B & B ELASTIC ANKLE BRACE) MISC Wear brace when up walking during the day 1 each 0    SUMAtriptan (IMITREX) 50 MG tablet take 1 tablet by mouth if needed AT ONSET OF HEADACHE may repeat in 2 hours IF headache PERSISTS maximum daily dose of 2 tablets ( 100 milligrams ) every 24 hours (Patient not taking: Reported on 7/27/2021) 9 tablet 1    Blood Pressure Monitor KIT Take blood pressure daily (Patient not taking: Reported on 7/27/2021) 1 kit 0     No current facility-administered medications for this visit. Review of Systems per Dr. Simin Taylor     /82 (Site: Left Lower Arm, Position: Sitting, Cuff Size: Large Adult)   Pulse 94   Temp 98.1 °F (36.7 °C) (Oral)   Resp 12   Ht 5' (1.524 m)   Wt (!) 312 lb 6.4 oz (141.7 kg)   LMP 05/03/2021 (Approximate)   SpO2 98%   BMI 61.01 kg/m²   BP Readings from Last 3 Encounters:   07/27/21 118/82   01/09/20 126/74   09/22/19 (!) 164/99       Wt Readings from Last 3 Encounters:   07/27/21 (!) 312 lb 6.4 oz (141.7 kg)   01/09/20 (!) 300 lb 9.6 oz (136.4 kg)   09/22/19 290 lb (131.5 kg)     Body mass index is 61.01 kg/m².     Physical Exam per Dr. Vikki Metzger    Lab Results   Component Value Date    LABA1C 5.3 09/22/2016       Lab Results   Component Value Date    CHOL 181 01/29/2019    TRIG 161 01/29/2019    HDL 45 01/29/2019    LDLCALC 104 01/29/2019       The ASCVD Risk score (Hermann Rangel., et al., 2013) failed to calculate for the following reasons: The 2013 ASCVD risk score is only valid for ages 36 to 78    Lab Results   Component Value Date     01/29/2019    K 4.5 01/29/2019     01/29/2019    CO2 24 01/29/2019    BUN 7 01/29/2019    CREATININE 0.5 01/29/2019    GLUCOSE 89 01/29/2019    CALCIUM 9.2 01/29/2019    PROT 7.0 01/29/2019    LABALBU 4.1 01/29/2019    BILITOT 0.3 01/29/2019    ALKPHOS 85 01/29/2019    AST 13 01/29/2019    ALT 16 01/29/2019    LABGLOM >90 01/29/2019       Lab Results   Component Value Date    TSH 2.890 01/29/2019       Lab Results   Component Value Date    WBC 8.1 01/29/2019    HGB 14.6 01/29/2019    HCT 44.4 01/29/2019    MCV 89.9 01/29/2019     01/29/2019         Future Appointments   Date Time Provider Jennifer Palacio   9/7/2021 10:20 AM Ashu Jean Baptiste MD [facility]       ASSESSMENT       Diagnosis Orders   1. Positive depression screening  Positive Screen for Clinical Depression with a Documented Follow-up Plan    2. Acute recurrent maxillary sinusitis  fluticasone (FLONASE) 50 MCG/ACT nasal spray    montelukast (SINGULAIR) 10 MG tablet   3. Viral URI  loratadine (CLARITIN) 10 MG tablet   4. Anxiety  FLUoxetine (PROZAC) 20 MG capsule   5. Moderate episode of recurrent major depressive disorder (HCC)  FLUoxetine (PROZAC) 20 MG capsule   6. Migraine with aura and without status migrainosus, not intractable  topiramate (TOPAMAX) 50 MG tablet   7. Acute cystitis with hematuria  POCT Urinalysis No Micro (Auto)    nitrofurantoin, macrocrystal-monohydrate, (MACROBID) 100 MG capsule    Culture, Urine       PLAN      After discussion with pt and Dr. Parish Solorio, we agreed on plan as follows:    1. macrobid for UTI.  Urine to culture  2. prozac to titrate up to 40mg  3. Increase topomax. Keep headache diary  4. Follow up in 6 wks           Attending Physician Statement  I have discussed the case, including pertinent history and exam findings with the resident. I also have seen the patient and performed key portions of the examination. I agree with the documented assessment and plan as documented by the resident.   GC modifier added to this encounter        Electronically signed by Beth Huddleston MD on 7/27/2021 at 3:49 PM

## 2021-07-27 NOTE — PATIENT INSTRUCTIONS
Prozac: take 1 capsule (20 mg) daily for 2 weeks. After two weeks, increase to 2 capsules (40 mg). Topamax: take 1 tablet (50 mg) at night.

## 2021-07-27 NOTE — TELEPHONE ENCOUNTER
Pt called in c/o dysuria. She said she used an azo test which showed she was positive for leukocytes  and nitrates. She is requesting an antibiotic to be sent in. I told her she may need to come in for an meredith so we can ensure the antibiotic we are giving her will treat the UTI.  Please advise

## 2021-07-28 LAB
ORGANISM: ABNORMAL
URINE CULTURE, ROUTINE: ABNORMAL

## 2021-07-29 ENCOUNTER — TELEPHONE (OUTPATIENT)
Dept: FAMILY MEDICINE CLINIC | Age: 31
End: 2021-07-29

## 2021-07-29 NOTE — RESULT ENCOUNTER NOTE
Randolph Romero,   Your urine culture grew E. Coli, which is sensitive to the medication I gave you. Let me know if you have any questions or if symptoms do not resolve.  Thanks,  Dr. Asher Estevez

## 2021-07-29 NOTE — TELEPHONE ENCOUNTER
----- Message from Pankaj Wood MD sent at 7/29/2021  7:46 AM EDT -----  Randolph Romero,   Your urine culture grew E. Coli, which is sensitive to the medication I gave you. Let me know if you have any questions or if symptoms do not resolve.  Thanks,  Dr. Jl Mcbride

## 2021-07-29 NOTE — TELEPHONE ENCOUNTER
Yes, she can increase to 75 mg if she still has 25 mg tablets (1 50 mg plus the 25) or break the 50 in half and take 1.5 pills.  thanks

## 2021-07-29 NOTE — TELEPHONE ENCOUNTER
Patient notified and voiced understanding. Patient had a question in regards to Topamax rx. States you guys had increase the medication to 50mg. Patient states that before she was taking 2 of the 25 mg tablets and it was not working for her. Wants to know if the 50mg can be increased.

## 2021-08-02 NOTE — TELEPHONE ENCOUNTER
Dr. Shyann Stern called and states that she is still having urgency and bladder pressure but the dysuria has stopped. Please Advise. Please send new script to [facility] on [address].

## 2021-08-03 DIAGNOSIS — N30.01 ACUTE CYSTITIS WITH HEMATURIA: Primary | ICD-10-CM

## 2021-08-03 RX ORDER — CIPROFLOXACIN 500 MG/1
500 TABLET, FILM COATED ORAL 2 TIMES DAILY
Qty: 14 TABLET | Refills: 0 | Status: SHIPPED | OUTPATIENT
Start: 2021-08-03 | End: 2021-08-10

## 2021-08-03 NOTE — PROGRESS NOTES
Reviewed urine culture and sensitivity. Patient called with continued urinary frequency and urgency. Will send script for Cipro 500 mg BID x 7 days.

## 2021-08-08 DIAGNOSIS — G43.109 MIGRAINE WITH AURA AND WITHOUT STATUS MIGRAINOSUS, NOT INTRACTABLE: ICD-10-CM

## 2021-08-09 RX ORDER — SUMATRIPTAN 50 MG/1
TABLET, FILM COATED ORAL
Qty: 9 TABLET | Refills: 1 | Status: SHIPPED | OUTPATIENT
Start: 2021-08-09 | End: 2021-12-14 | Stop reason: SDUPTHER

## 2021-08-09 NOTE — TELEPHONE ENCOUNTER
Last visit- 7/27/2021  Next visit- 9/7/2021    Requested Prescriptions     Pending Prescriptions Disp Refills    SUMAtriptan (IMITREX) 50 MG tablet [Pharmacy Med Name: SUMATRIPTAN SUCC 50 MG TABLET] 9 tablet 1     Sig: take 1 tablet by mouth if needed AT ONSET OF HEADACHE may repeat in 2 hours IF headache PERSISTS maximum daily dose of 2 tablets ( 100 milligrams ) every 24 hours

## 2021-08-18 NOTE — TELEPHONE ENCOUNTER
Heather called the office requesting something for her UTI symptoms. Indy Kelsey states that she feels that she needs to urinate frequently. Please advise.

## 2021-08-19 ENCOUNTER — NURSE ONLY (OUTPATIENT)
Dept: LAB | Age: 31
End: 2021-08-19

## 2021-08-19 ENCOUNTER — TELEPHONE (OUTPATIENT)
Dept: FAMILY MEDICINE CLINIC | Age: 31
End: 2021-08-19

## 2021-08-19 DIAGNOSIS — N30.01 ACUTE CYSTITIS WITH HEMATURIA: Primary | ICD-10-CM

## 2021-08-19 DIAGNOSIS — N30.01 ACUTE CYSTITIS WITH HEMATURIA: ICD-10-CM

## 2021-08-19 LAB
BACTERIA: NORMAL
BILIRUBIN URINE: NEGATIVE
BLOOD, URINE: NEGATIVE
CASTS: NORMAL /LPF
CASTS: NORMAL /LPF
CHARACTER, URINE: CLEAR
COLOR: YELLOW
CRYSTALS: NORMAL
EPITHELIAL CELLS, UA: NORMAL /HPF
GLUCOSE, URINE: NEGATIVE MG/DL
KETONES, URINE: NEGATIVE
LEUKOCYTE ESTERASE, URINE: NEGATIVE
MISCELLANEOUS LAB TEST RESULT: NORMAL
NITRITE, URINE: NEGATIVE
PH UA: 5 (ref 5–9)
PROTEIN UA: NEGATIVE MG/DL
RBC URINE: NORMAL /HPF
RENAL EPITHELIAL, UA: NORMAL
SPECIFIC GRAVITY UA: 1.02 (ref 1–1.03)
UROBILINOGEN, URINE: 0.2 EU/DL (ref 0–1)
WBC UA: NORMAL /HPF
YEAST: NORMAL

## 2021-08-19 NOTE — TELEPHONE ENCOUNTER
Reviewed message that she is still having UTI symptoms. She finished course of Cipro. Would like to check urine again before prescribing another antibiotic.

## 2021-08-26 ENCOUNTER — TELEPHONE (OUTPATIENT)
Dept: FAMILY MEDICINE CLINIC | Age: 31
End: 2021-08-26

## 2021-08-26 NOTE — TELEPHONE ENCOUNTER
Patient Communication  Seen by patient Sayda Gill on 8/25/2021  4:33 PM EDT    Back to Top    Randolph Romero, How are your symptoms? Your UA did not show any bacteria or signs of infection. Let me know if you are continuing to have symptoms.    Written by Odalys Parr MD on 8/25/2021  8:03 AM EDT

## 2021-08-26 NOTE — TELEPHONE ENCOUNTER
Message  Received: Lake Alcocer MD  [facility] Clinical Staff  Randolph Romero, How are your symptoms. Your UA did not show any bacteria or signs of infection.           Associated Results    Urinalysis With Microscopic  Order: [id number]  Status:  Final result   Visible to patient:  Yes (MyChart) Dx:  Acute cystitis with hematuria   1 Result Note     1 Patient Communication   Ref Range & Units 7 d ago 1 mo ago   Glucose, Urine NEGATIVE mg/dl NEGATIVE     Bilirubin Urine NEGATIVE NEGATIVE  Negative R    Ketones, Urine NEGATIVE NEGATIVE  Negative    Specific Gravity, UA 1.002 - 1.030 1.019     Blood, Urine NEGATIVE NEGATIVE     pH, UA 5.0 - 9.0 5.0     Protein, UA NEGATIVE mg/dl NEGATIVE     Urobilinogen, Urine 0.0 - 1.0 eu/dl 0.2  0.20    Nitrite, Urine NEGATIVE NEGATIVE  Negative    Leukocyte Esterase, Urine NEGATIVE NEGATIVE     Color, UA YELLOW-STRAW YELLOW  Yellow    Character, Urine CLR-SL.CLOUD CLEAR  Clear CM    RBC, UA 0-2/hpf /hpf 3-5     WBC, UA 0-4/hpf /hpf 2-4     Epithelial Cells, UA 3-5/hpf /hpf 0-2     Bacteria, UA FEW/NONE SEEN NONE SEEN     Casts NONE SEEN /lpf >15 HYALINE     Crystals NONE SEEN NONE SEEN     Renal Epithelial, UA NONE SEEN NONE SEEN     Yeast, UA NONE SEEN NONE SEEN     Casts /lpf NONE SEEN     Miscellaneous Lab Test Result  NONE SEEN     Comment: Performed at [facility], [address]   Resulting Agency  [address] [place] [address] Lab [address] [place] [address] Lab         Specimen Collected: 08/19/21 15:17 Last Resulted: 08/19/21 18:51         Lab Flowsheet      Order Details      View Encounter      Lab and Collection Details      Routing      Result History        CM=Additional comments  R=Reference range differs from displayed range        Result Care Coordination      Result Notes     Robbin Choi MD   8/25/2021  8:03 AM EDT Back to Top      Randolph Romero, How are your symptoms.  Your UA did not show any bacteria or signs of infection.         Patient Communication  Seen by patient Bryan Oscar on 8/25/2021  4:33 PM EDT    Back to Top    Randolph Romero, How are your symptoms? Your UA did not show any bacteria or signs of infection. Let me know if you are continuing to have symptoms.    Written by Kevin Thomas MD on 8/25/2021  8:03 AM EDT

## 2021-09-04 DIAGNOSIS — E66.01 OBESITY, CLASS III, BMI 40-49.9 (MORBID OBESITY) (HCC): ICD-10-CM

## 2021-09-09 RX ORDER — OMEGA-3 FATTY ACIDS/FISH OIL 300-500 MG
CAPSULE ORAL
Qty: 90 CAPSULE | Refills: 5 | Status: SHIPPED | OUTPATIENT
Start: 2021-09-09 | End: 2021-12-14 | Stop reason: SDUPTHER

## 2021-11-26 DIAGNOSIS — N92.6 IRREGULAR MENSTRUATION: ICD-10-CM

## 2021-11-26 DIAGNOSIS — G43.821 MENSTRUAL MIGRAINE WITH STATUS MIGRAINOSUS, NOT INTRACTABLE: ICD-10-CM

## 2021-11-26 NOTE — TELEPHONE ENCOUNTER
----- Message from Diego Henry sent at 11/23/2021  4:55 PM EST -----  Subject: Refill Request    QUESTIONS  Name of Medication? levonorgestrel-ethinyl estradiol (NORDETTE) 0.15-30   MG-MCG per tablet  Patient-reported dosage and instructions? take 1 tablet by mouth once   daily  How many days do you have left? 1  Preferred Pharmacy? [address] Pharmacy phone number (if available)? [phone]  Additional Information for Provider? Patient's pharmacy said they sent   over a request for this medication as well. Patient was calling back to   check the status of her prescription. I did not see an order for it so I   put in a request.   ---------------------------------------------------------------------------  --------------  CALL BACK INFO  What is the best way for the office to contact you? OK to leave message on   voicemail  Preferred Call Back Phone Number?  [phone]

## 2021-11-26 NOTE — TELEPHONE ENCOUNTER
Patient's last appointment was : 7/27/2021  Patient's next appointment is : Visit date not found  Last refilled: 3/29/2021

## 2021-11-30 ENCOUNTER — APPOINTMENT (OUTPATIENT)
Dept: GENERAL RADIOLOGY | Age: 31
End: 2021-11-30
Payer: MEDICARE

## 2021-11-30 ENCOUNTER — APPOINTMENT (OUTPATIENT)
Dept: CT IMAGING | Age: 31
End: 2021-11-30
Payer: MEDICARE

## 2021-11-30 ENCOUNTER — HOSPITAL ENCOUNTER (EMERGENCY)
Age: 31
Discharge: HOME | End: 2021-11-30
Payer: MEDICARE

## 2021-11-30 VITALS
WEIGHT: 293 LBS | DIASTOLIC BLOOD PRESSURE: 89 MMHG | HEART RATE: 105 BPM | OXYGEN SATURATION: 98 % | BODY MASS INDEX: 58.59 KG/M2 | RESPIRATION RATE: 16 BRPM | SYSTOLIC BLOOD PRESSURE: 151 MMHG | TEMPERATURE: 98.8 F

## 2021-11-30 DIAGNOSIS — R19.7 NAUSEA VOMITING AND DIARRHEA: Primary | ICD-10-CM

## 2021-11-30 DIAGNOSIS — R11.2 NAUSEA VOMITING AND DIARRHEA: Primary | ICD-10-CM

## 2021-11-30 LAB
ALBUMIN SERPL-MCNC: 4.3 G/DL (ref 3.5–5.1)
ALP BLD-CCNC: 89 U/L (ref 38–126)
ALT SERPL-CCNC: 119 U/L (ref 11–66)
ANION GAP SERPL CALCULATED.3IONS-SCNC: 17 MEQ/L (ref 8–16)
AST SERPL-CCNC: 58 U/L (ref 5–40)
BASOPHILS # BLD: 0.2 %
BASOPHILS ABSOLUTE: 0 THOU/MM3 (ref 0–0.1)
BILIRUB SERPL-MCNC: 0.4 MG/DL (ref 0.3–1.2)
BUN BLDV-MCNC: 7 MG/DL (ref 7–22)
CALCIUM SERPL-MCNC: 9.6 MG/DL (ref 8.5–10.5)
CHLORIDE BLD-SCNC: 101 MEQ/L (ref 98–111)
CO2: 21 MEQ/L (ref 23–33)
CREAT SERPL-MCNC: 0.5 MG/DL (ref 0.4–1.2)
EOSINOPHIL # BLD: 0.8 %
EOSINOPHILS ABSOLUTE: 0.1 THOU/MM3 (ref 0–0.4)
ERYTHROCYTE [DISTWIDTH] IN BLOOD BY AUTOMATED COUNT: 13.2 % (ref 11.5–14.5)
ERYTHROCYTE [DISTWIDTH] IN BLOOD BY AUTOMATED COUNT: 43.9 FL (ref 35–45)
GFR SERPL CREATININE-BSD FRML MDRD: > 90 ML/MIN/1.73M2
GLUCOSE BLD-MCNC: 95 MG/DL (ref 70–108)
HCG,BETA SUBUNIT,QUAL,SERUM: < 0.1 MIU/ML (ref 0–5)
HCT VFR BLD CALC: 44.6 % (ref 37–47)
HEMOGLOBIN: 14.5 GM/DL (ref 12–16)
IMMATURE GRANS (ABS): 0.03 THOU/MM3 (ref 0–0.07)
IMMATURE GRANULOCYTES: 0.3 %
LYMPHOCYTES # BLD: 22.8 %
LYMPHOCYTES ABSOLUTE: 2.4 THOU/MM3 (ref 1–4.8)
MCH RBC QN AUTO: 29.7 PG (ref 26–33)
MCHC RBC AUTO-ENTMCNC: 32.5 GM/DL (ref 32.2–35.5)
MCV RBC AUTO: 91.2 FL (ref 81–99)
MONOCYTES # BLD: 5.9 %
MONOCYTES ABSOLUTE: 0.6 THOU/MM3 (ref 0.4–1.3)
NUCLEATED RED BLOOD CELLS: 0 /100 WBC
OSMOLALITY CALCULATION: 275.3 MOSMOL/KG (ref 275–300)
PLATELET # BLD: 273 THOU/MM3 (ref 130–400)
PMV BLD AUTO: 9.1 FL (ref 9.4–12.4)
POTASSIUM SERPL-SCNC: 4 MEQ/L (ref 3.5–5.2)
RBC # BLD: 4.89 MILL/MM3 (ref 4.2–5.4)
SEG NEUTROPHILS: 70 %
SEGMENTED NEUTROPHILS ABSOLUTE COUNT: 7.4 THOU/MM3 (ref 1.8–7.7)
SODIUM BLD-SCNC: 139 MEQ/L (ref 135–145)
TOTAL PROTEIN: 7.6 G/DL (ref 6.1–8)
WBC # BLD: 10.6 THOU/MM3 (ref 4.8–10.8)

## 2021-11-30 PROCEDURE — 99282 EMERGENCY DEPT VISIT SF MDM: CPT

## 2021-11-30 PROCEDURE — 85025 COMPLETE CBC W/AUTO DIFF WBC: CPT

## 2021-11-30 PROCEDURE — 80053 COMPREHEN METABOLIC PANEL: CPT

## 2021-11-30 PROCEDURE — 6370000000 HC RX 637 (ALT 250 FOR IP): Performed by: PHYSICIAN ASSISTANT

## 2021-11-30 PROCEDURE — 6360000002 HC RX W HCPCS

## 2021-11-30 PROCEDURE — 96374 THER/PROPH/DIAG INJ IV PUSH: CPT

## 2021-11-30 PROCEDURE — 84702 CHORIONIC GONADOTROPIN TEST: CPT

## 2021-11-30 PROCEDURE — 36415 COLL VENOUS BLD VENIPUNCTURE: CPT

## 2021-11-30 PROCEDURE — 6360000004 HC RX CONTRAST MEDICATION: Performed by: PHYSICIAN ASSISTANT

## 2021-11-30 PROCEDURE — 96372 THER/PROPH/DIAG INJ SC/IM: CPT

## 2021-11-30 PROCEDURE — 6360000002 HC RX W HCPCS: Performed by: PHYSICIAN ASSISTANT

## 2021-11-30 PROCEDURE — 74022 RADEX COMPL AQT ABD SERIES: CPT

## 2021-11-30 PROCEDURE — 74177 CT ABD & PELVIS W/CONTRAST: CPT

## 2021-11-30 PROCEDURE — 2580000003 HC RX 258: Performed by: PHYSICIAN ASSISTANT

## 2021-11-30 RX ORDER — ONDANSETRON 4 MG/1
4 TABLET, ORALLY DISINTEGRATING ORAL EVERY 8 HOURS PRN
Qty: 20 TABLET | Refills: 0 | Status: SHIPPED | OUTPATIENT
Start: 2021-11-30 | End: 2021-12-14 | Stop reason: ALTCHOICE

## 2021-11-30 RX ORDER — ONDANSETRON 2 MG/ML
INJECTION INTRAMUSCULAR; INTRAVENOUS
Status: COMPLETED
Start: 2021-11-30 | End: 2021-11-30

## 2021-11-30 RX ORDER — LEVONORGESTREL AND ETHINYL ESTRADIOL 0.15-0.03
KIT ORAL
Qty: 112 TABLET | Refills: 0 | Status: SHIPPED | OUTPATIENT
Start: 2021-11-30 | End: 2021-12-14 | Stop reason: SDUPTHER

## 2021-11-30 RX ORDER — ONDANSETRON 4 MG/1
4 TABLET, ORALLY DISINTEGRATING ORAL ONCE
Status: COMPLETED | OUTPATIENT
Start: 2021-11-30 | End: 2021-11-30

## 2021-11-30 RX ORDER — ONDANSETRON 2 MG/ML
4 INJECTION INTRAMUSCULAR; INTRAVENOUS ONCE
Status: COMPLETED | OUTPATIENT
Start: 2021-11-30 | End: 2021-11-30

## 2021-11-30 RX ORDER — DICYCLOMINE HYDROCHLORIDE 10 MG/1
10 CAPSULE ORAL EVERY 6 HOURS PRN
Qty: 20 CAPSULE | Refills: 0 | Status: SHIPPED | OUTPATIENT
Start: 2021-11-30 | End: 2021-12-14 | Stop reason: ALTCHOICE

## 2021-11-30 RX ORDER — KETOROLAC TROMETHAMINE 30 MG/ML
30 INJECTION, SOLUTION INTRAMUSCULAR; INTRAVENOUS ONCE
Status: COMPLETED | OUTPATIENT
Start: 2021-11-30 | End: 2021-11-30

## 2021-11-30 RX ORDER — 0.9 % SODIUM CHLORIDE 0.9 %
1000 INTRAVENOUS SOLUTION INTRAVENOUS ONCE
Status: COMPLETED | OUTPATIENT
Start: 2021-11-30 | End: 2021-11-30

## 2021-11-30 RX ADMIN — SODIUM CHLORIDE 1000 ML: 9 INJECTION, SOLUTION INTRAVENOUS at 20:55

## 2021-11-30 RX ADMIN — KETOROLAC TROMETHAMINE 30 MG: 30 INJECTION, SOLUTION INTRAMUSCULAR; INTRAVENOUS at 20:08

## 2021-11-30 RX ADMIN — ONDANSETRON 4 MG: 2 INJECTION INTRAMUSCULAR; INTRAVENOUS at 20:58

## 2021-11-30 RX ADMIN — IOPAMIDOL 80 ML: 755 INJECTION, SOLUTION INTRAVENOUS at 21:15

## 2021-11-30 RX ADMIN — ONDANSETRON 4 MG: 4 TABLET, ORALLY DISINTEGRATING ORAL at 20:08

## 2021-11-30 ASSESSMENT — PAIN SCALES - GENERAL
PAINLEVEL_OUTOF10: 2
PAINLEVEL_OUTOF10: 4

## 2021-11-30 ASSESSMENT — PAIN DESCRIPTION - FREQUENCY: FREQUENCY: CONTINUOUS

## 2021-11-30 ASSESSMENT — PAIN DESCRIPTION - DESCRIPTORS: DESCRIPTORS: ACHING

## 2021-11-30 ASSESSMENT — PAIN DESCRIPTION - LOCATION: LOCATION: ABDOMEN

## 2021-11-30 ASSESSMENT — PAIN DESCRIPTION - PAIN TYPE: TYPE: ACUTE PAIN

## 2021-11-30 NOTE — ED TRIAGE NOTES
Patient presents to ED with chief complaint of Emesis, diarrhea, constipation, and abdominal pain. Patient states that she has tried a few different types of stool softener, but has not had a bowel movement since Saturday. AOx4. Respirations easy and unlabored. No distress noted.

## 2021-12-01 NOTE — TELEPHONE ENCOUNTER
Can she come in for an apt for a refill of the medication with Dr Ruchi Garvey?     And to recheck from the [facility] visit

## 2021-12-02 ENCOUNTER — HOSPITAL ENCOUNTER (EMERGENCY)
Age: 31
Discharge: HOME | End: 2021-12-02
Payer: MEDICARE

## 2021-12-02 VITALS
OXYGEN SATURATION: 96 % | HEART RATE: 115 BPM | HEIGHT: 67 IN | RESPIRATION RATE: 18 BRPM | WEIGHT: 293 LBS | SYSTOLIC BLOOD PRESSURE: 162 MMHG | BODY MASS INDEX: 45.99 KG/M2 | TEMPERATURE: 98.2 F | DIASTOLIC BLOOD PRESSURE: 100 MMHG

## 2021-12-02 DIAGNOSIS — J06.9 UPPER RESPIRATORY TRACT INFECTION, UNSPECIFIED TYPE: Primary | ICD-10-CM

## 2021-12-02 PROCEDURE — 99213 OFFICE O/P EST LOW 20 MIN: CPT | Performed by: NURSE PRACTITIONER

## 2021-12-02 PROCEDURE — 99213 OFFICE O/P EST LOW 20 MIN: CPT

## 2021-12-02 RX ORDER — BROMPHENIRAMINE MALEATE, PSEUDOEPHEDRINE HYDROCHLORIDE, AND DEXTROMETHORPHAN HYDROBROMIDE 2; 30; 10 MG/5ML; MG/5ML; MG/5ML
5 SYRUP ORAL 4 TIMES DAILY PRN
Qty: 118 ML | Refills: 0 | Status: SHIPPED | OUTPATIENT
Start: 2021-12-02 | End: 2021-12-14 | Stop reason: ALTCHOICE

## 2021-12-02 RX ORDER — CETIRIZINE HYDROCHLORIDE 10 MG/1
10 TABLET ORAL DAILY
Qty: 30 TABLET | Refills: 0 | Status: SHIPPED | OUTPATIENT
Start: 2021-12-02 | End: 2021-12-14 | Stop reason: ALTCHOICE

## 2021-12-02 RX ORDER — AMOXICILLIN 500 MG/1
500 CAPSULE ORAL 3 TIMES DAILY
Qty: 21 CAPSULE | Refills: 0 | Status: SHIPPED | OUTPATIENT
Start: 2021-12-02 | End: 2021-12-09

## 2021-12-02 ASSESSMENT — ENCOUNTER SYMPTOMS
RHINORRHEA: 1
SORE THROAT: 0
CHEST TIGHTNESS: 0
SHORTNESS OF BREATH: 0
COUGH: 1
NAUSEA: 1
VOMITING: 0
DIARRHEA: 0
SINUS PRESSURE: 1

## 2021-12-02 NOTE — ED TRIAGE NOTES
Pt walked to room 6 with family. Pt here with complaints of a cough, drainage, nauseated, weakness (hasn't ate today).

## 2021-12-02 NOTE — ED NOTES
Pt discharged. Pt verbalized understanding of discharge instructions and scripts. Pt walked out with family. Pt in stable condition.      Sandra Spence LPN  68/30/42 [id number]

## 2021-12-02 NOTE — ED PROVIDER NOTES
[facility]  Urgent Care Encounter       CHIEF COMPLAINT       Chief Complaint   Patient presents with    Cough     Cough, drainage, nauseated. Started 1 1/2 weeks ago. Nurses Notes reviewed and I agree except as noted in the HPI. HISTORY OF PRESENT ILLNESS   Nadeem Duong is a 27 y.o. female who presents to the [facility] urgent care for evaluation of cough. She reports the symptoms started 10 days ago. She reports the symptoms as nasal congestion, drainage, postnasal drainage, cough, nausea, and sinus pressure. She does report that she was in the emergency department 2 days ago for abdominal pain/cramping and constipation. She denies loss of taste or smell. She denies fever or chills. The history is provided by the patient. No  was used. REVIEW OF SYSTEMS     Review of Systems   Constitutional: Negative for activity change, appetite change, chills, fatigue and fever. HENT: Positive for congestion, postnasal drip, rhinorrhea and sinus pressure. Negative for ear discharge, ear pain and sore throat. Respiratory: Positive for cough. Negative for chest tightness and shortness of breath. Cardiovascular: Negative for chest pain. Gastrointestinal: Positive for nausea. Negative for diarrhea and vomiting. Genitourinary: Negative for dysuria. Skin: Negative for rash. Allergic/Immunologic: Negative for environmental allergies and food allergies. Neurological: Negative for dizziness and headaches. PAST MEDICAL HISTORY         Diagnosis Date    Anxiety 06/2016    Postpartum depression 2010       SURGICALHISTORY     Patient  has a past surgical history that includes [place] tooth extraction (2006).     CURRENT MEDICATIONS       Discharge Medication List as of 12/2/2021  3:55 PM      CONTINUE these medications which have NOT CHANGED    Details   levonorgestrel-ethinyl estradiol (NORDETTE) 0.15-30 MG-MCG per tablet One daily, Disp-112 tablet, R-0Normal      ondansetron (ZOFRAN ODT) 4 MG disintegrating tablet Take 1 tablet by mouth every 8 hours as needed for Nausea, Disp-20 tablet, R-0Normal      dicyclomine (BENTYL) 10 MG capsule Take 1 capsule by mouth every 6 hours as needed (cramps), Disp-20 capsule, R-0Normal      Omega-3 Fatty Acids (RA FISH OIL) 1000 MG CAPS Take 1 capsule 3 times daily, Disp-90 capsule, R-5Normal      SUMAtriptan (IMITREX) 50 MG tablet take 1 tablet by mouth if needed AT ONSET OF HEADACHE may repeat in 2 hours IF headache PERSISTS maximum daily dose of 2 tablets ( 100 milligrams ) every 24 hours, Disp-9 tablet, R-1Normal      fluticasone (FLONASE) 50 MCG/ACT nasal spray instill 1 spray into each nostril once daily, Disp-1 Bottle, R-1Normal      montelukast (SINGULAIR) 10 MG tablet take 1 tablet by mouth once daily, Disp-180 tablet, R-1Normal      FLUoxetine (PROZAC) 20 MG capsule Take 2 capsules by mouth daily, Disp-60 capsule, R-3Normal      topiramate (TOPAMAX) 50 MG tablet take 1 tablet by mouth at bedtime. , Disp-60 tablet, R-1Normal      RA VITAMIN D-3 125 MCG (5000 UT) CAPS capsule take 1 capsule by mouth once daily, Disp-30 capsule, R-5Normal      busPIRone (BUSPAR) 5 MG tablet Take 1 tablet by mouth 3 times daily as needed (anxiety), Disp-90 tablet, R-5Normal      Cinnamon 500 MG CAPS Take four times daily before meals and at bedtime, Disp-120 capsule, R-5Normal      Vitamins-Lipotropics (BALANCED B-100 COMPLEX CR) TBCR Take 1 tablet by mouth 4 times daily (after meals and at bedtime), Disp-120 tablet, R-5Normal      Saline 0.2 % SOLN by Nasal routeHistorical Med      guaiFENesin (MUCINEX) 600 MG extended release tablet Take 1 tablet by mouth 2 times daily, Disp-20 tablet, R-1Normal      Elastic Bandages & Supports (B & B ELASTIC ANKLE BRACE) MISC Disp-1 each, R-0, PrintWear brace when up walking during the day             ALLERGIES     Patient is is allergic to latex and adhesive tape.     Patients There is no immunization history for the selected administration types on file for this patient. FAMILY HISTORY     Patient's family history includes Diabetes in her maternal grandmother; High Blood Pressure in her brother and father; Hildeazucenad Pete / Prashantib[place]i in her mother. SOCIAL HISTORY     Patient  reports that she quit smoking about 15 years ago. She has never used smokeless tobacco. She reports that she does not drink alcohol and does not use drugs. PHYSICAL EXAM     ED TRIAGE VITALS  BP: (!) 162/100, Temp: 98.2 °F (36.8 °C), Pulse: 115, Resp: 18, SpO2: 96 %,Estimated body mass index is 48.96 kg/m² as calculated from the following:    Height as of this encounter: 5' 7\" (1.702 m). Weight as of this encounter: 312 lb 9.6 oz (141.8 kg). ,Patient's last menstrual period was 11/18/2021 (approximate). Physical Exam  Vitals and nursing note reviewed. Constitutional:       General: She is not in acute distress. Appearance: Normal appearance. She is not ill-appearing, toxic-appearing or diaphoretic. HENT:      Head: Normocephalic. Right Ear: Ear canal and external ear normal.      Left Ear: Ear canal and external ear normal.      Nose: Nose normal. No congestion or rhinorrhea. Mouth/Throat:      Mouth: Mucous membranes are moist.      Pharynx: Oropharynx is clear. No oropharyngeal exudate or posterior oropharyngeal erythema. Cardiovascular:      Rate and Rhythm: Normal rate. Pulses: Normal pulses. Pulmonary:      Effort: Pulmonary effort is normal. No respiratory distress. Breath sounds: No stridor. No wheezing or rhonchi. Abdominal:      General: Abdomen is flat. Bowel sounds are normal.      Palpations: Abdomen is soft. Musculoskeletal:         General: No swelling or tenderness. Normal range of motion. Cervical back: Normal range of motion. Neurological:      General: No focal deficit present. Mental Status: She is alert and oriented to person, place, and time. Psychiatric:         Mood and Affect: Mood normal.         Behavior: Behavior normal.         DIAGNOSTIC RESULTS     Labs:No results found for this visit on 12/02/21. IMAGING:    No orders to display         EKG: None      URGENT CARE COURSE:     Vitals:    12/02/21 1504   BP: (!) 162/100   Pulse: 115   Resp: 18   Temp: 98.2 °F (36.8 °C)   TempSrc: Oral   SpO2: 96%   Weight: (!) 312 lb 9.6 oz (141.8 kg)   Height: 5' 7\" (1.702 m)       Medications - No data to display         PROCEDURES:  None    FINAL IMPRESSION      1. Upper respiratory tract infection, unspecified type          DISPOSITION/ PLAN     Patient seen and evaluated for the above symptoms. Assessment consistent with likely upper respiratory type infection. Due to the duration of symptoms will treat with amoxicillin. Also provided prescriptions for Bromfed-DM and Zyrtec. Instructed to use over-the-counter Tylenol and Motrin for pain or fever. Instructed to follow-up with PCP in 3 to 5 days with new worsening symptoms. Patient is agreeable to above plan denies questions or concerns at this time.         PATIENT REFERRED TO:  [facility],   [address] [address]      DISCHARGE MEDICATIONS:  Discharge Medication List as of 12/2/2021  3:55 PM      START taking these medications    Details   amoxicillin (AMOXIL) 500 MG capsule Take 1 capsule by mouth 3 times daily for 7 days, Disp-21 capsule, R-0Normal      brompheniramine-pseudoephedrine-DM 2-30-10 MG/5ML syrup Take 5 mLs by mouth 4 times daily as needed for Congestion or Cough, Disp-118 mL, R-0Normal      cetirizine (ZYRTEC) 10 MG tablet Take 1 tablet by mouth daily, Disp-30 tablet, R-0Normal             Discharge Medication List as of 12/2/2021  3:55 PM      STOP taking these medications       guaiFENesin (ROBITUSSIN) 100 MG/5ML liquid Comments:   Reason for Stopping:               Discharge Medication List as of 12/2/2021  3:55 PM          EARL Steen -

## 2021-12-05 ASSESSMENT — ENCOUNTER SYMPTOMS
ABDOMINAL PAIN: 1
DIARRHEA: 1
BACK PAIN: 0
CONSTIPATION: 1
CHEST TIGHTNESS: 0
RHINORRHEA: 0
EYE REDNESS: 0
VOMITING: 1
NAUSEA: 1
COUGH: 0

## 2021-12-05 NOTE — ED PROVIDER NOTES
[facility] Emergency [address] [place] [address]       Chief Complaint   Patient presents with    Emesis    Diarrhea    Constipation       Nurses Notes reviewed and I agree except as noted in the HPI. HISTORY OF PRESENT ILLNESS    Tyler Mortensen mohit 27 y.o. female who presents to the ED for evaluation of vomiting, diarrhea followed by constipation and abdominal pains. She has been using stool softeners but has not been able to have a BM since Saturday. Has a hx of IBS. Usually stool softeners will help her but not this time. No fever. HPI was provided by the patient    REVIEW OF SYSTEMS     Review of Systems   Constitutional: Negative for chills, fatigue and fever. HENT: Negative for congestion, ear discharge, ear pain, postnasal drip and rhinorrhea. Eyes: Negative for redness. Respiratory: Negative for cough and chest tightness. Cardiovascular: Negative for chest pain and leg swelling. Gastrointestinal: Positive for abdominal pain, constipation, diarrhea, nausea and vomiting. Genitourinary: Negative for difficulty urinating, dysuria, enuresis, flank pain and hematuria. Musculoskeletal: Negative for back pain and joint swelling. Skin: Negative for rash. Neurological: Negative for dizziness, light-headedness, numbness and headaches. Psychiatric/Behavioral: Negative for agitation, behavioral problems and confusion. All other systems negative except as noted. PAST MEDICAL HISTORY     Past Medical History:   Diagnosis Date    Anxiety 06/2016    Postpartum depression 2010       SURGICALHISTORY      has a past surgical history that includes [place] tooth extraction (2006).     CURRENT MEDICATIONS       Discharge Medication List as of 11/30/2021 10:31 PM      CONTINUE these medications which have NOT CHANGED    Details   Omega-3 Fatty Acids (RA FISH OIL) 1000 MG CAPS Take 1 capsule 3 times daily, Disp-90 capsule, R-5Normal      SUMAtriptan (IMITREX) 50 MG tablet take 1 tablet by mouth if needed AT ONSET OF HEADACHE may repeat in 2 hours IF headache PERSISTS maximum daily dose of 2 tablets ( 100 milligrams ) every 24 hours, Disp-9 tablet, R-1Normal      fluticasone (FLONASE) 50 MCG/ACT nasal spray instill 1 spray into each nostril once daily, Disp-1 Bottle, R-1Normal      montelukast (SINGULAIR) 10 MG tablet take 1 tablet by mouth once daily, Disp-180 tablet, R-1Normal      FLUoxetine (PROZAC) 20 MG capsule Take 2 capsules by mouth daily, Disp-60 capsule, R-3Normal      topiramate (TOPAMAX) 50 MG tablet take 1 tablet by mouth at bedtime. , Disp-60 tablet, R-1Normal      loratadine (CLARITIN) 10 MG tablet Take 1 tablet by mouth daily, Disp-30 tablet, R-0Normal      RA VITAMIN D-3 125 MCG (5000 UT) CAPS capsule take 1 capsule by mouth once daily, Disp-30 capsule, R-5Normal      guaiFENesin (ROBITUSSIN) 100 MG/5ML liquid Take 10 mLs by mouth 3 times daily as needed for Cough, Disp-1 Bottle, R-0Normal      busPIRone (BUSPAR) 5 MG tablet Take 1 tablet by mouth 3 times daily as needed (anxiety), Disp-90 tablet, R-5Normal      Cinnamon 500 MG CAPS Take four times daily before meals and at bedtime, Disp-120 capsule, R-5Normal      Vitamins-Lipotropics (BALANCED B-100 COMPLEX CR) TBCR Take 1 tablet by mouth 4 times daily (after meals and at bedtime), Disp-120 tablet, R-5Normal      Saline 0.2 % SOLN by Nasal routeHistorical Med      Phenylephrine-DM-GG (MUCINEX FAST-MAX CONGEST COUGH) 5- MG TABS Take by mouthHistorical Med      guaiFENesin (MUCINEX) 600 MG extended release tablet Take 1 tablet by mouth 2 times daily, Disp-20 tablet, R-1Normal      Elastic Bandages & Supports (B & B ELASTIC ANKLE BRACE) MISC Disp-1 each, R-0, PrintWear brace when up walking during the day             ALLERGIES     is allergic to latex and adhesive tape. FAMILY HISTORY     She indicated that her mother is alive. She indicated that her father is alive.  She indicated that all of her three sisters are Club or Organization Meetings: Not on file    Marital Status: Not on file   Intimate Partner Violence:     Fear of Current or Ex-Partner: Not on file    Emotionally Abused: Not on file    Physically Abused: Not on file    Sexually Abused: Not on file   Housing Stability:     Unable to Pay for Housing in the Last Year: Not on file    Number of Ruth in the Last Year: Not on file    Unstable Housing in the Last Year: Not on file       PHYSICAL EXAM     INITIAL VITALS:  weight is 300 lb (136.1 kg). Her temperature is 98.8 °F (37.1 °C). Her blood pressure is 151/89 (abnormal) and her pulse is 105. Her respiration is 16 and oxygen saturation is 98%. Physical Exam  Vitals and nursing note reviewed. Constitutional:       General: She is not in acute distress. Appearance: She is well-developed. She is not diaphoretic. HENT:      Head: Normocephalic and atraumatic. Nose: Nose normal.      Mouth/Throat:      Mouth: Mucous membranes are moist.      Pharynx: Oropharynx is clear. Eyes:      General:         Right eye: No discharge. Left eye: No discharge. Conjunctiva/sclera: Conjunctivae normal.   Neck:      Trachea: No tracheal deviation. Cardiovascular:      Rate and Rhythm: Normal rate and regular rhythm. Pulses: Normal pulses. Heart sounds: Normal heart sounds. No murmur heard. No gallop. Comments: Normal capillary refill  Pulmonary:      Effort: Pulmonary effort is normal. No respiratory distress. Breath sounds: Normal breath sounds. No stridor. Abdominal:      General: Bowel sounds are normal.      Palpations: Abdomen is soft. Tenderness: There is abdominal tenderness (diffuse). Musculoskeletal:         General: No tenderness or deformity. Normal range of motion. Cervical back: Normal range of motion. Skin:     General: Skin is warm and dry. Capillary Refill: Capillary refill takes less than 2 seconds.       Coloration: Skin is not pale.      Findings: No erythema or rash. Neurological:      General: No focal deficit present. Mental Status: She is alert and oriented to person, place, and time. Cranial Nerves: No cranial nerve deficit. Psychiatric:         Mood and Affect: Mood normal.         Behavior: Behavior normal.         DIFFERENTIAL DIAGNOSIS:   IBS, obstruction, diverticulitis, appy    DIAGNOSTIC RESULTS     EKG: All EKG's are interpreted by the Emergency Department Physician who eithersigns or Co-signs this chart in the absence of a cardiologist.        RADIOLOGY: non-plainfilm images(s) such as CT, Ultrasound and MRI are read by the radiologist.  Plain radiographic images are visualized and preliminarily interpreted by the emergency physician unless otherwise stated below. CT ABDOMEN PELVIS W IV CONTRAST Additional Contrast? None   Final Result   No acute abdominal or pelvic abnormality            **This report has been created using voice recognition software. It may contain minor errors which are inherent in voice recognition technology. **      Final report electronically signed by Dr. Keller Seen on 11/30/2021 9:45 PM      XR ACUTE ABD SERIES CHEST 1 VW   Final Result   No acute cardiopulmonary disease no acute abdominal pathology            **This report has been created using voice recognition software. It may contain minor errors which are inherent in voice recognition technology. **      Final report electronically signed by Dr. Keller Seen on 11/30/2021 8:23 PM            LABS:   Labs Reviewed   CBC WITH AUTO DIFFERENTIAL - Abnormal; Notable for the following components:       Result Value    MPV 9.1 (*)     All other components within normal limits   COMPREHENSIVE METABOLIC PANEL - Abnormal; Notable for the following components:    CO2 21 (*)     AST 58 (*)      (*)     All other components within normal limits   ANION GAP - Abnormal; Notable for the following components:    Anion Gap 17.0 (*)     All other components within normal limits   HCG, QUANTITATIVE, PREGNANCY   GLOMERULAR FILTRATION RATE, ESTIMATED   OSMOLALITY       EMERGENCY DEPARTMENT COURSE:   Vitals:    Vitals:    11/30/21 1811   BP: (!) 151/89   Pulse: 105   Resp: 16   Temp: 98.8 °F (37.1 °C)   SpO2: 98%   Weight: 300 lb (136.1 kg)                              MDM    Patient was seen and evaluated in the emergency department, patient appeared to be in stable condition. Vital signs assessed, no abnormality noted. Physical exam completed. Diffuse abdominal tenderness is noted but worse in the lower abdomen. Patient is treated with IVF, Toradol and Zofran. KUB shows non obstructive pattern. CT shows a normal abdomen and pelvis. I reviewed findings with the patient. She is comfortable with discharge home with close follow up. Medications   ondansetron (ZOFRAN-ODT) disintegrating tablet 4 mg (4 mg Oral Given 11/30/21 2008)   ketorolac (TORADOL) injection 30 mg (30 mg IntraMUSCular Given 11/30/21 2008)   0.9 % sodium chloride bolus (0 mLs IntraVENous Stopped 11/30/21 2250)   ondansetron (ZOFRAN) injection 4 mg (4 mg IntraVENous Given 11/30/21 2058)   iopamidol (ISOVUE-370) 76 % injection 80 mL (80 mLs IntraVENous Given 11/30/21 2115)       Please note that the patient was evaluated during a pandemic. All efforts were made for HIPPA compliance as well as provision of appropriate care. Patient was seen independently by myself. The patient's final impression and disposition and plan was determined by myself. Strict return precautions and follow up instructions were discussed with the patient prior to discharge, with which the patient agrees. Physical assessment findings, diagnostic testing(s) if applicable, and vital signs reviewed with patient/patient representative. Questions answered. Medications asdirected, including OTC medications for supportive care. Education provided on medications.   Differential diagnosis(s) discussed with patient/patient representative. Home care/self care instructions reviewed withpatient/patient representative. Patient is to follow-up with family care provider in 2-3 days if no improvement. Patient is to go to the emergency department if symptoms worsen. Patient/patient representative isaware of care plan, questions answered, verbalizes understanding and is in agreement. CRITICAL CARE:   None    CONSULTS:  None    PROCEDURES:  None    FINAL IMPRESSION     1.  Nausea vomiting and diarrhea          DISPOSITION/PLAN   DISPOSITION Decision To Discharge 11/30/2021 10:31:00 PM      PATIENT REFERREDTO:  Nena Bolanos   [address] [facility] [address]  [phone]    Schedule an appointment as soon as possible for a visit in 2 days  For follow up      DISCHARGE MEDICATIONS:  Discharge Medication List as of 11/30/2021 10:31 PM      START taking these medications    Details   ondansetron (ZOFRAN ODT) 4 MG disintegrating tablet Take 1 tablet by mouth every 8 hours as needed for Nausea, Disp-20 tablet, R-0Normal      dicyclomine (BENTYL) 10 MG capsule Take 1 capsule by mouth every 6 hours as needed (cramps), Disp-20 capsule, R-0Normal      levonorgestrel-ethinyl estradiol (NORDETTE) 0.15-30 MG-MCG per tablet One daily, Disp-112 tablet, R-0Normal             (Please note that portions of this note were completed with a voice recognition program.  Efforts were made to edit the dictations but occasionally words are mis-transcribed.)         EARL Lares - EARL Melton CNP  12/05/21 7305

## 2021-12-07 ASSESSMENT — ENCOUNTER SYMPTOMS
ABDOMINAL PAIN: 1
NAUSEA: 1
BLOOD IN STOOL: 0
SORE THROAT: 0
SHORTNESS OF BREATH: 0
BACK PAIN: 1
PHOTOPHOBIA: 0
COUGH: 0
RHINORRHEA: 0
CONSTIPATION: 1
VOMITING: 1
DIARRHEA: 1

## 2021-12-07 NOTE — ED PROVIDER NOTES
[facility] EMERGENCY DEPT      CHIEF COMPLAINT       Chief Complaint   Patient presents with    Emesis    Diarrhea    Constipation       Nurses Notes reviewed and I agree except as noted in the HPI. HISTORY OF PRESENT ILLNESS    Lindsey Morales is a 27 y.o. female who presents for constipation. Saturday patient ate mac & cheese. She developed nausea, headache, and diarrhea. Sunday evening she started to feel constipated. There were minor cold symptoms and sinus drainage. The constipation has continued, today being Tuesday. She has tried Colace, magnesium citrate, milk of magnesia, and MiraLAX. Patient has passed some diarrhea described as watery but there has been no solid bowel movement. Patient complains of intermittent abdominal cramping that produces vomiting when its intense. There is low back discomfort and generalized weakness. Patient is passing gas. Patient denies fever, chills, urinary changes, UTI symptoms, chest pain, dyspnea, or other complaints. Patient has no prior history of abdominal surgery. Patient denies possibility of pregnancy. The patient declines Covid testing. Location/Symptom: Generalized abdominal pain  Timing/Onset: 3 to 4 days  Context/Setting: Patient had some diarrhea after eating mac & cheese and became constipated. She had she tried multiple stool softeners and laxatives without relief. Quality: Cramping  Duration: Episodic/intermittent  Modifying Factors: None  Severity: Mild     REVIEW OF SYSTEMS     Review of Systems   Constitutional: Positive for appetite change and fever. Negative for chills. HENT: Positive for congestion and postnasal drip. Negative for ear pain, rhinorrhea and sore throat. Eyes: Negative for photophobia. Respiratory: Negative for cough and shortness of breath. Cardiovascular: Negative for chest pain. Gastrointestinal: Positive for abdominal pain, constipation, diarrhea, nausea and vomiting. Negative for blood in stool. Endocrine: Negative for polyuria. Genitourinary: Negative for difficulty urinating, dysuria, flank pain and frequency. Musculoskeletal: Positive for back pain. Negative for gait problem. Skin: Negative for rash. Neurological: Positive for weakness and headaches. Negative for dizziness and numbness. Psychiatric/Behavioral: Negative for confusion and sleep disturbance. PAST MEDICAL HISTORY    has a past medical history of Anxiety and Postpartum depression. SURGICAL HISTORY      has a past surgical history that includes [place] tooth extraction (2006). CURRENT MEDICATIONS       Discharge Medication List as of 11/30/2021 10:31 PM      CONTINUE these medications which have NOT CHANGED    Details   Omega-3 Fatty Acids (RA FISH OIL) 1000 MG CAPS Take 1 capsule 3 times daily, Disp-90 capsule, R-5Normal      SUMAtriptan (IMITREX) 50 MG tablet take 1 tablet by mouth if needed AT ONSET OF HEADACHE may repeat in 2 hours IF headache PERSISTS maximum daily dose of 2 tablets ( 100 milligrams ) every 24 hours, Disp-9 tablet, R-1Normal      fluticasone (FLONASE) 50 MCG/ACT nasal spray instill 1 spray into each nostril once daily, Disp-1 Bottle, R-1Normal      montelukast (SINGULAIR) 10 MG tablet take 1 tablet by mouth once daily, Disp-180 tablet, R-1Normal      FLUoxetine (PROZAC) 20 MG capsule Take 2 capsules by mouth daily, Disp-60 capsule, R-3Normal      topiramate (TOPAMAX) 50 MG tablet take 1 tablet by mouth at bedtime. , Disp-60 tablet, R-1Normal      loratadine (CLARITIN) 10 MG tablet Take 1 tablet by mouth daily, Disp-30 tablet, R-0Normal      RA VITAMIN D-3 125 MCG (5000 UT) CAPS capsule take 1 capsule by mouth once daily, Disp-30 capsule, R-5Normal      guaiFENesin (ROBITUSSIN) 100 MG/5ML liquid Take 10 mLs by mouth 3 times daily as needed for Cough, Disp-1 Bottle, R-0Normal      busPIRone (BUSPAR) 5 MG tablet Take 1 tablet by mouth 3 times daily as needed (anxiety), Disp-90 tablet, R-5Normal Cinnamon 500 MG CAPS Take four times daily before meals and at bedtime, Disp-120 capsule, R-5Normal      Vitamins-Lipotropics (BALANCED B-100 COMPLEX CR) TBCR Take 1 tablet by mouth 4 times daily (after meals and at bedtime), Disp-120 tablet, R-5Normal      Saline 0.2 % SOLN by Nasal routeHistorical Med      Phenylephrine-DM-GG (MUCINEX FAST-MAX CONGEST COUGH) 5- MG TABS Take by mouthHistorical Med      guaiFENesin (MUCINEX) 600 MG extended release tablet Take 1 tablet by mouth 2 times daily, Disp-20 tablet, R-1Normal      Elastic Bandages & Supports (B & B ELASTIC ANKLE BRACE) MISC Disp-1 each, R-0, PrintWear brace when up walking during the day             ALLERGIES     is allergic to latex and adhesive tape. FAMILY HISTORY     She indicated that her mother is alive. She indicated that her father is alive. She indicated that all of her three sisters are alive. She indicated that both of her brothers are alive. She indicated that her maternal grandmother is alive. She indicated that her maternal grandfather is alive. She indicated that her paternal grandmother is alive. She indicated that her paternal grandfather is alive. family history includes Diabetes in her maternal grandmother; High Blood Pressure in her brother and father; Freica Lian / Alberto er in her mother. SOCIAL HISTORY    reports that she quit smoking about 15 years ago. She has never used smokeless tobacco. She reports that she does not drink alcohol and does not use drugs. PHYSICAL EXAM     INITIAL VITALS:  weight is 300 lb (136.1 kg). Her temperature is 98.8 °F (37.1 °C). Her blood pressure is 151/89 (abnormal) and her pulse is 105. Her respiration is 16 and oxygen saturation is 98%. Physical Exam  Vitals and nursing note reviewed. Constitutional:       General: She is not in acute distress. Appearance: Normal appearance. She is well-developed. She is not toxic-appearing or diaphoretic.    HENT:      Head: Normocephalic and atraumatic. Right Ear: Hearing normal.      Left Ear: Hearing normal.      Nose: Nose normal. No rhinorrhea. Mouth/Throat:      Lips: Pink. Mouth: Mucous membranes are moist.      Pharynx: Uvula midline. Eyes:      General: Lids are normal. No scleral icterus. Extraocular Movements: Extraocular movements intact. Conjunctiva/sclera: Conjunctivae normal.      Pupils: Pupils are equal, round, and reactive to light. Neck:      Vascular: No JVD. Trachea: Trachea normal. No tracheal deviation. Cardiovascular:      Rate and Rhythm: Normal rate and regular rhythm. Heart sounds: Normal heart sounds. Pulmonary:      Effort: Pulmonary effort is normal. No respiratory distress. Breath sounds: Normal breath sounds. No decreased breath sounds or wheezing. Abdominal:      General: Bowel sounds are normal. There is no distension. Palpations: Abdomen is soft. Abdomen is not rigid. There is no pulsatile mass. Tenderness: There is generalized abdominal tenderness. There is no right CVA tenderness, left CVA tenderness, guarding or rebound. Negative signs include Andersen's sign and McBurney's sign. Hernia: No hernia is present. Musculoskeletal:         General: Normal range of motion. Cervical back: No rigidity. Comments: Movement normal as observed   Lymphadenopathy:      Cervical: No cervical adenopathy. Skin:     General: Skin is warm and dry. Coloration: Skin is not pale. Findings: No rash. Neurological:      General: No focal deficit present. Mental Status: She is alert and oriented to person, place, and time. Gait: Gait normal.   Psychiatric:         Mood and Affect: Mood normal.         Speech: Speech normal.         Behavior: Behavior normal.         Thought Content:  Thought content normal.         Cognition and Memory: Cognition normal.         DIFFERENTIAL DIAGNOSIS:   Including but not limited to: Constipation, colitis, diverticulitis    DIAGNOSTIC RESULTS     EKG: All E[facility]'s [facility] who either signs or Co-signs this chart in the absence of a cardiologist.  None    RADIOLOGY: non-plain film images(s) such as CT,Ultrasound and MRI are read by the radiologist.  Plain radiographic images are visualized and preliminarily interpreted by the emergency physician unless otherwise stated below. CT ABDOMEN PELVIS W IV CONTRAST Additional Contrast? None   Final Result   No acute abdominal or pelvic abnormality            **This report has been created using voice recognition software. It may contain minor errors which are inherent in voice recognition technology. **      Final report electronically signed by Dr. Miguel Serrano on 11/30/2021 9:45 PM      XR ACUTE ABD SERIES CHEST 1 VW   Final Result   No acute cardiopulmonary disease no acute abdominal pathology            **This report has been created using voice recognition software. It may contain minor errors which are inherent in voice recognition technology. **      Final report electronically signed by Dr. Miguel Serrano on 11/30/2021 8:23 PM          LABS:   Labs Reviewed   CBC WITH AUTO DIFFERENTIAL - Abnormal; Notable for the following components:       Result Value    MPV 9.1 (*)     All other components within normal limits   COMPREHENSIVE METABOLIC PANEL - Abnormal; Notable for the following components:    CO2 21 (*)     AST 58 (*)      (*)     All other components within normal limits   ANION GAP - Abnormal; Notable for the following components:    Anion Gap 17.0 (*)     All other components within normal limits   HCG, QUANTITATIVE, PREGNANCY   GLOMERULAR FILTRATION RATE, ESTIMATED   OSMOLALITY       EMERGENCY DEPARTMENT COURSE:   Vitals:    Vitals:    11/30/21 1811   BP: (!) 151/89   Pulse: 105   Resp: 16   Temp: 98.8 °F (37.1 °C)   SpO2: 98%   Weight: 300 lb (136.1 kg)       Patient was seen in the emergency department during the global pandemic, when there was surge capacity and regional healthcare crisis. MDM:  The patient was seen and evaluated by me in the intake area. Vital signs were reviewed and noted stable. Physical exam revealed a nontoxic-appearing patient with mild diffuse abdominal tenderness. Appropriate testing was ordered. Results were reviewed by me upon completion. Results showed no acute abnormalities. X-rays were reviewed by me and not impressive for significant constipation. Due to patient's pain level and lack of constipation seen on the x-rays I felt we should proceed with CT scan to rule out diverticulitis. Results were discussed with the patient as well as plan of care. Patient was amenable to progressing forward with a CT scan. Patient was turned over to Mario Chaney, NP at the end of my shift. CRITICAL CARE:   None    CONSULTS:  None    PROCEDURES:  None    FINAL IMPRESSION      1. Nausea vomiting and diarrhea          DISPOSITION/PLAN     1.  Nausea vomiting and diarrhea        PATIENT REFERRED TO:  Mike Saunders DO  [address] [place] [address] [facility] [address]  [phone]    Schedule an appointment as soon as possible for a visit in 2 days  For follow up      DISCHARGE MEDICATIONS:  Discharge Medication List as of 11/30/2021 10:31 PM      START taking these medications    Details   ondansetron (ZOFRAN ODT) 4 MG disintegrating tablet Take 1 tablet by mouth every 8 hours as needed for Nausea, Disp-20 tablet, R-0Normal      dicyclomine (BENTYL) 10 MG capsule Take 1 capsule by mouth every 6 hours as needed (cramps), Disp-20 capsule, R-0Normal      levonorgestrel-ethinyl estradiol (NORDETTE) 0.15-30 MG-MCG per tablet One daily, Disp-112 tablet, R-0Normal             (Please note that portions of this note were completed with a voice recognition program.  Efforts were made to edit the dictations but occasionally words are mis-transcribed.)    Kathlean Landau, PA-C 12/07/21 3:07 PM    Scott Fulton Tre Jarrett  12/07/21 6421

## 2021-12-10 ENCOUNTER — HOSPITAL ENCOUNTER (EMERGENCY)
Age: 31
Discharge: HOME | End: 2021-12-10
Payer: MEDICARE

## 2021-12-10 ENCOUNTER — APPOINTMENT (OUTPATIENT)
Dept: GENERAL RADIOLOGY | Age: 31
End: 2021-12-10
Payer: MEDICARE

## 2021-12-10 VITALS
RESPIRATION RATE: 20 BRPM | SYSTOLIC BLOOD PRESSURE: 142 MMHG | BODY MASS INDEX: 48.87 KG/M2 | HEART RATE: 104 BPM | TEMPERATURE: 98.2 F | OXYGEN SATURATION: 97 % | WEIGHT: 293 LBS | DIASTOLIC BLOOD PRESSURE: 80 MMHG

## 2021-12-10 DIAGNOSIS — U07.1 COVID-19: Primary | ICD-10-CM

## 2021-12-10 LAB — SARS-COV-2, NAA: DETECTED

## 2021-12-10 PROCEDURE — 99213 OFFICE O/P EST LOW 20 MIN: CPT

## 2021-12-10 PROCEDURE — 71046 X-RAY EXAM CHEST 2 VIEWS: CPT

## 2021-12-10 PROCEDURE — 87635 SARS-COV-2 COVID-19 AMP PRB: CPT

## 2021-12-10 PROCEDURE — 99213 OFFICE O/P EST LOW 20 MIN: CPT | Performed by: NURSE PRACTITIONER

## 2021-12-10 ASSESSMENT — ENCOUNTER SYMPTOMS
DIARRHEA: 0
ABDOMINAL PAIN: 0
CHEST TIGHTNESS: 0
VOMITING: 0
SINUS PRESSURE: 0
SORE THROAT: 0
EYE ITCHING: 0
COUGH: 1
EYE REDNESS: 0
SHORTNESS OF BREATH: 0
NAUSEA: 0

## 2021-12-10 NOTE — ED PROVIDER NOTES
[address]       Chief Complaint   Patient presents with    Covid Testing    Cough     x1 week        Nurses Notes reviewed and I agree except as noted in the HPI. HISTORY OF PRESENT ILLNESS   Ning Sahu is a 27 y.o. female who presents for evaluation and COVID-19 testing. The patient/patient representative has no other acute complaints at this time. REVIEW OF SYSTEMS     Review of Systems   Constitutional: Negative for chills, fatigue and fever. HENT: Negative for congestion, ear pain, sinus pressure and sore throat. Eyes: Negative for redness and itching. Respiratory: Positive for cough. Negative for chest tightness and shortness of breath. Cardiovascular: Negative for chest pain. Gastrointestinal: Negative for abdominal pain, diarrhea, nausea and vomiting. Skin: Negative for rash. Allergic/Immunologic: Negative for environmental allergies and food allergies. Neurological: Negative for headaches. Hematological: Negative for adenopathy. PAST MEDICAL HISTORY         Diagnosis Date    Anxiety 06/2016    Postpartum depression 2010       SURGICAL HISTORY     Patient  has a past surgical history that includes Adams tooth extraction (2006).     CURRENT MEDICATIONS       Discharge Medication List as of 12/10/2021  1:19 PM      CONTINUE these medications which have NOT CHANGED    Details   brompheniramine-pseudoephedrine-DM 2-30-10 MG/5ML syrup Take 5 mLs by mouth 4 times daily as needed for Congestion or Cough, Disp-118 mL, R-0Normal      cetirizine (ZYRTEC) 10 MG tablet Take 1 tablet by mouth daily, Disp-30 tablet, R-0Normal      levonorgestrel-ethinyl estradiol (NORDETTE) 0.15-30 MG-MCG per tablet One daily, Disp-112 tablet, R-0Normal      ondansetron (ZOFRAN ODT) 4 MG disintegrating tablet Take 1 tablet by mouth every 8 hours as needed for Nausea, Disp-20 tablet, R-0Normal      dicyclomine (BENTYL) 10 MG capsule Take 1 capsule by mouth every 6 hours as needed (cramps), Disp-20 capsule, R-0Normal      Omega-3 Fatty Acids (RA FISH OIL) 1000 MG CAPS Take 1 capsule 3 times daily, Disp-90 capsule, R-5Normal      SUMAtriptan (IMITREX) 50 MG tablet take 1 tablet by mouth if needed AT ONSET OF HEADACHE may repeat in 2 hours IF headache PERSISTS maximum daily dose of 2 tablets ( 100 milligrams ) every 24 hours, Disp-9 tablet, R-1Normal      fluticasone (FLONASE) 50 MCG/ACT nasal spray instill 1 spray into each nostril once daily, Disp-1 Bottle, R-1Normal      montelukast (SINGULAIR) 10 MG tablet take 1 tablet by mouth once daily, Disp-180 tablet, R-1Normal      FLUoxetine (PROZAC) 20 MG capsule Take 2 capsules by mouth daily, Disp-60 capsule, R-3Normal      topiramate (TOPAMAX) 50 MG tablet take 1 tablet by mouth at bedtime. , Disp-60 tablet, R-1Normal      RA VITAMIN D-3 125 MCG (5000 UT) CAPS capsule take 1 capsule by mouth once daily, Disp-30 capsule, R-5Normal      busPIRone (BUSPAR) 5 MG tablet Take 1 tablet by mouth 3 times daily as needed (anxiety), Disp-90 tablet, R-5Normal      Cinnamon 500 MG CAPS Take four times daily before meals and at bedtime, Disp-120 capsule, R-5Normal      Vitamins-Lipotropics (BALANCED B-100 COMPLEX CR) TBCR Take 1 tablet by mouth 4 times daily (after meals and at bedtime), Disp-120 tablet, R-5Normal      Saline 0.2 % SOLN by Nasal routeHistorical Med      guaiFENesin (MUCINEX) 600 MG extended release tablet Take 1 tablet by mouth 2 times daily, Disp-20 tablet, R-1Normal      Elastic Bandages & Supports (B & B ELASTIC ANKLE BRACE) MISC Disp-1 each, R-0, PrintWear brace when up walking during the day             ALLERGIES     Patient is is allergic to latex and adhesive tape. FAMILY HISTORY     Patient'sfamily history includes Diabetes in her maternal grandmother; High Blood Pressure in her brother and father; Hildegard Clore / [place] in her mother.     SOCIAL HISTORY     Patient  reports that she quit smoking about 15 years ago. She has never used smokeless tobacco. She reports that she does not drink alcohol and does not use drugs. PHYSICAL EXAM     ED TRIAGE VITALS  BP: (!) 142/80, Temp: 98.2 °F (36.8 °C), Pulse: 104, Resp: 20, SpO2: 97 %  Physical Exam  Vitals and nursing note reviewed. Constitutional:       General: She is not in acute distress. Appearance: Normal appearance. She is well-developed and well-groomed. HENT:      Head: Normocephalic and atraumatic. Right Ear: External ear normal.      Left Ear: External ear normal.      Nose: Mucosal edema and congestion present. Mouth/Throat:      Lips: Pink. Mouth: Mucous membranes are moist.   Eyes:      Conjunctiva/sclera: Conjunctivae normal.      Right eye: Right conjunctiva is not injected. Left eye: Left conjunctiva is not injected. Pupils: Pupils are equal.   Cardiovascular:      Rate and Rhythm: Normal rate. Heart sounds: Normal heart sounds. Pulmonary:      Effort: Pulmonary effort is normal. No respiratory distress. Breath sounds: Examination of the right-lower field reveals decreased breath sounds. Examination of the left-lower field reveals decreased breath sounds. Decreased breath sounds present. Musculoskeletal:      Cervical back: Normal range of motion. Skin:     General: Skin is warm and dry. Findings: No rash (on exposed surfaces). Neurological:      Mental Status: She is alert and oriented to person, place, and time. Psychiatric:         Mood and Affect: Mood normal.         Speech: Speech normal.         Behavior: Behavior is cooperative.          DIAGNOSTIC RESULTS   Labs:  Abnormal Labs Reviewed   COVID-19, RAPID - Abnormal; Notable for the following components:       Result Value    SARS-CoV-2, CITLALY DETECTED (*)     All other components within normal limits        IMAGING:  XR CHEST (2 VW)   Final Result   Stable radiographic appearance of the chest. No evidence of an acute process. **This report has been created using voice recognition software. It may contain minor errors which are inherent in voice recognition technology. **      Final report electronically signed by Dr. Estrella Winter on 12/10/2021 1:11 PM        URGENT CARE COURSE:     Vitals:    12/10/21 1240   BP: (!) 142/80   Pulse: 104   Resp: 20   Temp: 98.2 °F (36.8 °C)   TempSrc: Tympanic   SpO2: 97%   Weight: (!) 312 lb (141.5 kg)       Medications - No data to display  PROCEDURES:  FINALIMPRESSION      1. COVID-19        DISPOSITION/PLAN   DISPOSITION Decision To Discharge 12/10/2021 01:00:46 PM    ED Course as of 12/10/21 1448   Fri Dec 10, 2021   1447 SARS-CoV-2, CITLALY(!!): DETECTED [HA]   1448 XR CHEST (2 VW) [HA]      ED Course User Index  [ESQUIVEL] Superior Million, APRN - CNP     Physical assessment findings, diagnostic testing(s) if applicable, and vital signs reviewed with patient/patient representative. If applicable, patient/patient representative will be contacted upon receipt of final culture and sensitivity or other testing results when available. Any additions or changes to medications or changes the plan of care will be made at that time. Differential diagnosis(s) discussed with patient/patient representative. Prescription medications and/or over-the-counter medications for symptom management discussed. Patient is to follow-up with family care provider in 2-3 days if no improvement. Patient is to go to the emergency department if symptoms change/worsen. Patient/patient representative is aware of care plan, questions answered, verbalizes understanding and is in agreement. Printed instructions attached to after visit summary.       Problem List Items Addressed This Visit     None      Visit Diagnoses     COVID-19    -  Primary          PATIENT REFERRED TO:  Shirley Domingo DO  [address]  [facility] [address]  [phone]    Schedule an appointment as soon as possible for a visit in 1 days  For further evaluation. , If symptoms change/worsen, go to the [id number] [facility], APRN - CNP    Please note that some or all of this chart was generated using [employer] voice recognition software. Although every effort was made to ensure the accuracy of this automated transcription, some errors in transcription may have occurred.         EARL Joyner - CNP  12/10/21 8182

## 2021-12-14 ENCOUNTER — HOSPITAL ENCOUNTER (EMERGENCY)
Age: 31
Discharge: HOME | End: 2021-12-14
Attending: EMERGENCY MEDICINE
Payer: MEDICARE

## 2021-12-14 ENCOUNTER — APPOINTMENT (OUTPATIENT)
Dept: GENERAL RADIOLOGY | Age: 31
End: 2021-12-14
Payer: MEDICARE

## 2021-12-14 ENCOUNTER — TELEMEDICINE (OUTPATIENT)
Dept: FAMILY MEDICINE CLINIC | Age: 31
End: 2021-12-14
Payer: MEDICARE

## 2021-12-14 VITALS
OXYGEN SATURATION: 97 % | DIASTOLIC BLOOD PRESSURE: 94 MMHG | SYSTOLIC BLOOD PRESSURE: 166 MMHG | HEART RATE: 83 BPM | RESPIRATION RATE: 19 BRPM | TEMPERATURE: 98.2 F

## 2021-12-14 DIAGNOSIS — U07.1 COVID-19: Primary | ICD-10-CM

## 2021-12-14 DIAGNOSIS — E86.0 DEHYDRATION: ICD-10-CM

## 2021-12-14 DIAGNOSIS — F40.01 PANIC DISORDER WITH AGORAPHOBIA: ICD-10-CM

## 2021-12-14 DIAGNOSIS — J01.01 ACUTE RECURRENT MAXILLARY SINUSITIS: ICD-10-CM

## 2021-12-14 DIAGNOSIS — N92.6 IRREGULAR MENSTRUATION: ICD-10-CM

## 2021-12-14 DIAGNOSIS — E66.01 OBESITY, CLASS III, BMI 40-49.9 (MORBID OBESITY) (HCC): ICD-10-CM

## 2021-12-14 DIAGNOSIS — F33.1 MODERATE EPISODE OF RECURRENT MAJOR DEPRESSIVE DISORDER (HCC): ICD-10-CM

## 2021-12-14 DIAGNOSIS — G43.821 MENSTRUAL MIGRAINE WITH STATUS MIGRAINOSUS, NOT INTRACTABLE: ICD-10-CM

## 2021-12-14 DIAGNOSIS — F41.9 ANXIETY: ICD-10-CM

## 2021-12-14 DIAGNOSIS — E55.9 VITAMIN D DEFICIENCY: ICD-10-CM

## 2021-12-14 DIAGNOSIS — G43.109 MIGRAINE WITH AURA AND WITHOUT STATUS MIGRAINOSUS, NOT INTRACTABLE: ICD-10-CM

## 2021-12-14 LAB
ANION GAP SERPL CALCULATED.3IONS-SCNC: 13 MEQ/L (ref 8–16)
BASOPHILS # BLD: 0.2 %
BASOPHILS ABSOLUTE: 0 THOU/MM3 (ref 0–0.1)
BUN BLDV-MCNC: 5 MG/DL (ref 7–22)
C-REACTIVE PROTEIN: 5.31 MG/DL (ref 0–1)
CALCIUM SERPL-MCNC: 8.8 MG/DL (ref 8.5–10.5)
CHLORIDE BLD-SCNC: 96 MEQ/L (ref 98–111)
CO2: 26 MEQ/L (ref 23–33)
CREAT SERPL-MCNC: 0.6 MG/DL (ref 0.4–1.2)
EKG ATRIAL RATE: 101 BPM
EKG P AXIS: 7 DEGREES
EKG P-R INTERVAL: 136 MS
EKG Q-T INTERVAL: 322 MS
EKG QRS DURATION: 86 MS
EKG QTC CALCULATION (BAZETT): 417 MS
EKG R AXIS: 3 DEGREES
EKG T AXIS: 37 DEGREES
EKG VENTRICULAR RATE: 101 BPM
EOSINOPHIL # BLD: 0.2 %
EOSINOPHILS ABSOLUTE: 0 THOU/MM3 (ref 0–0.4)
ERYTHROCYTE [DISTWIDTH] IN BLOOD BY AUTOMATED COUNT: 13.2 % (ref 11.5–14.5)
ERYTHROCYTE [DISTWIDTH] IN BLOOD BY AUTOMATED COUNT: 43.2 FL (ref 35–45)
FERRITIN: 589 NG/ML (ref 10–291)
GFR SERPL CREATININE-BSD FRML MDRD: > 90 ML/MIN/1.73M2
GLUCOSE BLD-MCNC: 89 MG/DL (ref 70–108)
HCT VFR BLD CALC: 41.9 % (ref 37–47)
HEMOGLOBIN: 14 GM/DL (ref 12–16)
IMMATURE GRANS (ABS): 0.03 THOU/MM3 (ref 0–0.07)
IMMATURE GRANULOCYTES: 0.7 %
LACTIC ACID: 1.2 MMOL/L (ref 0.5–2)
LD: 263 U/L (ref 100–190)
LYMPHOCYTES # BLD: 25.2 %
LYMPHOCYTES ABSOLUTE: 1.1 THOU/MM3 (ref 1–4.8)
MCH RBC QN AUTO: 29.7 PG (ref 26–33)
MCHC RBC AUTO-ENTMCNC: 33.4 GM/DL (ref 32.2–35.5)
MCV RBC AUTO: 88.8 FL (ref 81–99)
MONOCYTES # BLD: 6.1 %
MONOCYTES ABSOLUTE: 0.3 THOU/MM3 (ref 0.4–1.3)
NUCLEATED RED BLOOD CELLS: 0 /100 WBC
OSMOLALITY CALCULATION: 266.8 MOSMOL/KG (ref 275–300)
PLATELET # BLD: 181 THOU/MM3 (ref 130–400)
PMV BLD AUTO: 9.5 FL (ref 9.4–12.4)
POTASSIUM SERPL-SCNC: 3.9 MEQ/L (ref 3.5–5.2)
RBC # BLD: 4.72 MILL/MM3 (ref 4.2–5.4)
SEG NEUTROPHILS: 67.6 %
SEGMENTED NEUTROPHILS ABSOLUTE COUNT: 3 THOU/MM3 (ref 1.8–7.7)
SODIUM BLD-SCNC: 135 MEQ/L (ref 135–145)
TROPONIN T: < 0.01 NG/ML
WBC # BLD: 4.5 THOU/MM3 (ref 4.8–10.8)

## 2021-12-14 PROCEDURE — 6360000002 HC RX W HCPCS: Performed by: STUDENT IN AN ORGANIZED HEALTH CARE EDUCATION/TRAINING PROGRAM

## 2021-12-14 PROCEDURE — 85025 COMPLETE CBC W/AUTO DIFF WBC: CPT

## 2021-12-14 PROCEDURE — 84484 ASSAY OF TROPONIN QUANT: CPT

## 2021-12-14 PROCEDURE — 96374 THER/PROPH/DIAG INJ IV PUSH: CPT

## 2021-12-14 PROCEDURE — 80048 BASIC METABOLIC PNL TOTAL CA: CPT

## 2021-12-14 PROCEDURE — 83615 LACTATE (LD) (LDH) ENZYME: CPT

## 2021-12-14 PROCEDURE — G8427 DOCREV CUR MEDS BY ELIG CLIN: HCPCS | Performed by: NURSE PRACTITIONER

## 2021-12-14 PROCEDURE — 93005 ELECTROCARDIOGRAM TRACING: CPT | Performed by: EMERGENCY MEDICINE

## 2021-12-14 PROCEDURE — 2580000003 HC RX 258: Performed by: STUDENT IN AN ORGANIZED HEALTH CARE EDUCATION/TRAINING PROGRAM

## 2021-12-14 PROCEDURE — 96375 TX/PRO/DX INJ NEW DRUG ADDON: CPT

## 2021-12-14 PROCEDURE — 82728 ASSAY OF FERRITIN: CPT

## 2021-12-14 PROCEDURE — 36415 COLL VENOUS BLD VENIPUNCTURE: CPT

## 2021-12-14 PROCEDURE — 99282 EMERGENCY DEPT VISIT SF MDM: CPT

## 2021-12-14 PROCEDURE — 96361 HYDRATE IV INFUSION ADD-ON: CPT

## 2021-12-14 PROCEDURE — 86140 C-REACTIVE PROTEIN: CPT

## 2021-12-14 PROCEDURE — 71045 X-RAY EXAM CHEST 1 VIEW: CPT

## 2021-12-14 PROCEDURE — 99214 OFFICE O/P EST MOD 30 MIN: CPT | Performed by: NURSE PRACTITIONER

## 2021-12-14 PROCEDURE — 83605 ASSAY OF LACTIC ACID: CPT

## 2021-12-14 PROCEDURE — 93010 ELECTROCARDIOGRAM REPORT: CPT | Performed by: INTERNAL MEDICINE

## 2021-12-14 RX ORDER — LEVONORGESTREL AND ETHINYL ESTRADIOL 0.15-0.03
KIT ORAL
Qty: 112 TABLET | Refills: 0 | Status: SHIPPED | OUTPATIENT
Start: 2021-12-14 | End: 2022-01-21 | Stop reason: SDUPTHER

## 2021-12-14 RX ORDER — ALBUTEROL SULFATE 90 UG/1
2 AEROSOL, METERED RESPIRATORY (INHALATION) 4 TIMES DAILY PRN
Qty: 18 G | Refills: 0 | Status: SHIPPED | OUTPATIENT
Start: 2021-12-14

## 2021-12-14 RX ORDER — SODIUM CHLORIDE 9 MG/ML
25 INJECTION, SOLUTION INTRAVENOUS PRN
Status: CANCELLED | OUTPATIENT
Start: 2021-12-14

## 2021-12-14 RX ORDER — METHYLPREDNISOLONE SODIUM SUCCINATE 125 MG/2ML
125 INJECTION, POWDER, LYOPHILIZED, FOR SOLUTION INTRAMUSCULAR; INTRAVENOUS
Status: CANCELLED | OUTPATIENT
Start: 2021-12-14 | End: 2021-12-14

## 2021-12-14 RX ORDER — SODIUM CHLORIDE 0.9 % (FLUSH) 0.9 %
5-40 SYRINGE (ML) INJECTION EVERY 12 HOURS SCHEDULED
Status: CANCELLED | OUTPATIENT
Start: 2021-12-14

## 2021-12-14 RX ORDER — BUSPIRONE HYDROCHLORIDE 5 MG/1
5 TABLET ORAL 3 TIMES DAILY PRN
Qty: 90 TABLET | Refills: 5 | Status: SHIPPED | OUTPATIENT
Start: 2021-12-14 | End: 2022-04-06 | Stop reason: ALTCHOICE

## 2021-12-14 RX ORDER — SODIUM CHLORIDE 9 MG/ML
100 INJECTION, SOLUTION INTRAVENOUS CONTINUOUS PRN
Status: CANCELLED | OUTPATIENT
Start: 2021-12-14

## 2021-12-14 RX ORDER — ONDANSETRON 2 MG/ML
4 INJECTION INTRAMUSCULAR; INTRAVENOUS ONCE
Status: COMPLETED | OUTPATIENT
Start: 2021-12-14 | End: 2021-12-14

## 2021-12-14 RX ORDER — FLUTICASONE PROPIONATE 50 MCG
SPRAY, SUSPENSION (ML) NASAL
Qty: 1 EACH | Refills: 3 | Status: SHIPPED | OUTPATIENT
Start: 2021-12-14

## 2021-12-14 RX ORDER — SUMATRIPTAN 50 MG/1
TABLET, FILM COATED ORAL
Qty: 9 TABLET | Refills: 1 | Status: SHIPPED | OUTPATIENT
Start: 2021-12-14 | End: 2022-01-21 | Stop reason: SDUPTHER

## 2021-12-14 RX ORDER — DIPHENHYDRAMINE HYDROCHLORIDE 50 MG/ML
50 INJECTION INTRAMUSCULAR; INTRAVENOUS
Status: CANCELLED | OUTPATIENT
Start: 2021-12-14 | End: 2021-12-14

## 2021-12-14 RX ORDER — FLUOXETINE HYDROCHLORIDE 20 MG/1
40 CAPSULE ORAL DAILY
Qty: 60 CAPSULE | Refills: 3 | Status: SHIPPED | OUTPATIENT
Start: 2021-12-14 | End: 2022-04-04

## 2021-12-14 RX ORDER — TOPIRAMATE 50 MG/1
TABLET, FILM COATED ORAL
Qty: 60 TABLET | Refills: 1 | Status: SHIPPED | OUTPATIENT
Start: 2021-12-14 | End: 2022-01-21 | Stop reason: SDUPTHER

## 2021-12-14 RX ORDER — OMEGA-3 FATTY ACIDS/FISH OIL 300-500 MG
CAPSULE ORAL
Qty: 90 CAPSULE | Refills: 5 | Status: SHIPPED | OUTPATIENT
Start: 2021-12-14

## 2021-12-14 RX ORDER — MONTELUKAST SODIUM 10 MG/1
TABLET ORAL
Qty: 180 TABLET | Refills: 1 | Status: SHIPPED | OUTPATIENT
Start: 2021-12-14

## 2021-12-14 RX ORDER — KETOROLAC TROMETHAMINE 30 MG/ML
30 INJECTION, SOLUTION INTRAMUSCULAR; INTRAVENOUS ONCE
Status: COMPLETED | OUTPATIENT
Start: 2021-12-14 | End: 2021-12-14

## 2021-12-14 RX ORDER — SODIUM CHLORIDE 0.9 % (FLUSH) 0.9 %
5-40 SYRINGE (ML) INJECTION PRN
Status: CANCELLED | OUTPATIENT
Start: 2021-12-14

## 2021-12-14 RX ORDER — CHOLECALCIFEROL (VITAMIN D3) 125 MCG
CAPSULE ORAL
Qty: 30 CAPSULE | Refills: 5 | Status: SHIPPED | OUTPATIENT
Start: 2021-12-14 | End: 2022-05-31

## 2021-12-14 RX ORDER — 0.9 % SODIUM CHLORIDE 0.9 %
1000 INTRAVENOUS SOLUTION INTRAVENOUS ONCE
Status: COMPLETED | OUTPATIENT
Start: 2021-12-14 | End: 2021-12-14

## 2021-12-14 RX ORDER — SODIUM CHLORIDE 9 MG/ML
INJECTION, SOLUTION INTRAVENOUS CONTINUOUS PRN
Status: CANCELLED | OUTPATIENT
Start: 2021-12-14 | End: 2021-12-15

## 2021-12-14 RX ADMIN — SODIUM CHLORIDE 1000 ML: 9 INJECTION, SOLUTION INTRAVENOUS at 13:01

## 2021-12-14 RX ADMIN — KETOROLAC TROMETHAMINE 30 MG: 30 INJECTION, SOLUTION INTRAMUSCULAR; INTRAVENOUS at 13:02

## 2021-12-14 RX ADMIN — ONDANSETRON 4 MG: 2 INJECTION INTRAMUSCULAR; INTRAVENOUS at 13:02

## 2021-12-14 ASSESSMENT — PAIN SCALES - GENERAL
PAINLEVEL_OUTOF10: 5
PAINLEVEL_OUTOF10: 5

## 2021-12-14 ASSESSMENT — ENCOUNTER SYMPTOMS
NAUSEA: 0
EYE REDNESS: 0
RHINORRHEA: 0
ANAL BLEEDING: 0
ABDOMINAL DISTENTION: 0
BLOOD IN STOOL: 0
CONSTIPATION: 0
DIARRHEA: 0
EYE DISCHARGE: 0
RESPIRATORY NEGATIVE: 1
GASTROINTESTINAL NEGATIVE: 1
EYES NEGATIVE: 1
COLOR CHANGE: 0
ABDOMINAL PAIN: 0
SORE THROAT: 0
COUGH: 1
SHORTNESS OF BREATH: 0

## 2021-12-14 ASSESSMENT — PAIN DESCRIPTION - PAIN TYPE: TYPE: ACUTE PAIN

## 2021-12-14 ASSESSMENT — PAIN DESCRIPTION - DESCRIPTORS: DESCRIPTORS: TIGHTNESS

## 2021-12-14 ASSESSMENT — PAIN DESCRIPTION - LOCATION: LOCATION: CHEST

## 2021-12-14 NOTE — ED NOTES
Patient ambulates with pulse oximetry and is noted to elevate to 98% on room air. Patient voices shortness of breath. Patient back to bed without difficulty.       Nano Mann RN  12/14/21 2036

## 2021-12-14 NOTE — ED TRIAGE NOTES
Pt presents to the ED for chest pain and sob x4 days. Pt states she has been sick for a month but tested positive for covid on 12/10. Pt states her O2 at home was 86-94%.

## 2021-12-14 NOTE — PROGRESS NOTES
[facility]  [phone] (phone)  [phone] (fax)    Visit Date: 12/14/2021    Tyler Mortensen is a 27 y.o. female who presents today for:  Chief Complaint   Patient presents with    Post-COVID Symptoms     HPI:     THIS VISIT WAS PERFORMED VIA A SYNCHRONOUS TELECOMMUNICATION SYSTEM. Patient gave consent for synchronous telecommunication visit during ACSAW-46 public health emergency. I was present in my home utilizing EPIC patient was in their home. Visit was started at [address] for follow-up - dx with covid 12/10/2021 - symptoms started 11/30/2021 - c/o increased sob, cough, fatigue. Weakness with walking.      Oxygen dropped to 86% last night after walking to the restroom - will go do 89-90%, never above 94%    Feels like she can get a deep breath - having pain between shoulder blades and chest.     HPI  Health Maintenance   Topic Date Due    Hepatitis C screen  Never done    Varicella vaccine (1 of 2 - 2-dose childhood series) Never done    COVID-19 Vaccine (1) Never done    DTaP/Tdap/Td vaccine (1 - Tdap) Never done    Flu vaccine (1) Never done    Cervical cancer screen  05/21/2022    HIV screen  Completed    Hepatitis A vaccine  Aged Out    Hepatitis B vaccine  Aged Out    Hib vaccine  Aged Out    Meningococcal (ACWY) vaccine  Aged Out    Pneumococcal 0-64 years Vaccine  Aged Out     Past Medical History:   Diagnosis Date    Anxiety 06/2016    Postpartum depression 2010      Past Surgical History:   Procedure Laterality Date    WISDOM TOOTH EXTRACTION  2006     Family History   Problem Relation Age of Onset   Ha Miscarriages / [place] Mother     High Blood Pressure Father     High Blood Pressure Brother     Diabetes Maternal Grandmother      Social History     Tobacco Use    Smoking status: Former Smoker     Quit date: 5/25/2006     Years since quitting: 15.5    Smokeless tobacco: Never Used   Substance Use Topics    Alcohol use: No      Current Outpatient Medications   Medication Sig Dispense Refill    levonorgestrel-ethinyl estradiol (NORDETTE) 0.15-30 MG-MCG per tablet One daily 112 tablet 0    SUMAtriptan (IMITREX) 50 MG tablet take 1 tablet by mouth if needed AT ONSET OF HEADACHE may repeat in 2 hours IF headache PERSISTS maximum daily dose of 2 tablets ( 100 milligrams ) every 24 hours 9 tablet 1    fluticasone (FLONASE) 50 MCG/ACT nasal spray instill 1 spray into each nostril once daily 1 each 3    montelukast (SINGULAIR) 10 MG tablet take 1 tablet by mouth once daily 180 tablet 1    FLUoxetine (PROZAC) 20 MG capsule Take 2 capsules by mouth daily 60 capsule 3    topiramate (TOPAMAX) 50 MG tablet take 1 tablet by mouth at bedtime. 60 tablet 1    busPIRone (BUSPAR) 5 MG tablet Take 1 tablet by mouth 3 times daily as needed (anxiety) 90 tablet 5    Omega-3 Fatty Acids (RA FISH OIL) 1000 MG CAPS Take 1 capsule 3 times daily 90 capsule 5    vitamin D (RA VITAMIN D-3) 125 MCG (5000 UT) CAPS capsule take 1 capsule by mouth once daily 30 capsule 5    Vitamins-Lipotropics (BALANCED B-100 COMPLEX CR) TBCR Take 1 tablet by mouth 4 times daily (after meals and at bedtime) 120 tablet 5    Cinnamon 500 MG CAPS Take four times daily before meals and at bedtime 120 capsule 5    Saline 0.2 % SOLN by Nasal route      guaiFENesin (MUCINEX) 600 MG extended release tablet Take 1 tablet by mouth 2 times daily 20 tablet 1    Elastic Bandages & Supports (B & B ELASTIC ANKLE BRACE) MISC Wear brace when up walking during the day 1 each 0     No current facility-administered medications for this visit. Allergies   Allergen Reactions    Latex Rash     And itching    Adhesive Tape Rash       Subjective:    Review of Systems   Constitutional: Positive for activity change and fatigue. Negative for chills and fever. HENT: Positive for congestion. Negative for ear pain, postnasal drip, rhinorrhea and sore throat. Eyes: Negative for discharge and redness. Date    WBC 10.6 11/30/2021    HGB 14.5 11/30/2021    HCT 44.6 11/30/2021     11/30/2021    CHOL 181 01/29/2019    TRIG 161 01/29/2019    HDL 45 01/29/2019    LDLCALC 104 01/29/2019    AST 58 (H) 11/30/2021     11/30/2021    K 4.0 11/30/2021     11/30/2021    CREATININE 0.5 11/30/2021    BUN 7 11/30/2021    CO2 21 (L) 11/30/2021    TSH 2.890 01/29/2019    LABA1C 5.3 09/22/2016    LABGLOM >90 11/30/2021    MG 2.0 01/29/2019    CALCIUM 9.6 11/30/2021    VITD25 24 (L) 01/29/2019     Assessment:       Diagnosis Orders   1. Irregular menstruation  levonorgestrel-ethinyl estradiol (NORDETTE) 0.15-30 MG-MCG per tablet   2. Menstrual migraine with status migrainosus, not intractable  levonorgestrel-ethinyl estradiol (NORDETTE) 0.15-30 MG-MCG per tablet   3. Migraine with aura and without status migrainosus, not intractable  SUMAtriptan (IMITREX) 50 MG tablet    topiramate (TOPAMAX) 50 MG tablet   4. Acute recurrent maxillary sinusitis  fluticasone (FLONASE) 50 MCG/ACT nasal spray    montelukast (SINGULAIR) 10 MG tablet   5. Anxiety  FLUoxetine (PROZAC) 20 MG capsule    busPIRone (BUSPAR) 5 MG tablet   6. Moderate episode of recurrent major depressive disorder (HCC)  FLUoxetine (PROZAC) 20 MG capsule   7. Obesity, Class III, BMI 40-49.9 (morbid obesity) (HCC)  Omega-3 Fatty Acids (RA FISH OIL) 1000 MG CAPS   8. Vitamin D deficiency  vitamin D (RA VITAMIN D-3) 125 MCG (5000 UT) CAPS capsule   9. Panic disorder with agoraphobia  Vitamins-Lipotropics (BALANCED B-100 COMPLEX CR) TBCR     Plan:     Heather was seen today for post-covid symptoms. Diagnoses and all orders for this visit:    Irregular menstruation  -     levonorgestrel-ethinyl estradiol (NORDETTE) 0.15-30 MG-MCG per tablet; One daily    Menstrual migraine with status migrainosus, not intractable  -     levonorgestrel-ethinyl estradiol (NORDETTE) 0.15-30 MG-MCG per tablet;  One daily    Migraine with aura and without status migrainosus, not intractable  -     SUMAtriptan (IMITREX) 50 MG tablet; take 1 tablet by mouth if needed AT ONSET OF HEADACHE may repeat in 2 hours IF headache PERSISTS maximum daily dose of 2 tablets ( 100 milligrams ) every 24 hours  -     topiramate (TOPAMAX) 50 MG tablet; take 1 tablet by mouth at bedtime. Acute recurrent maxillary sinusitis  -     fluticasone (FLONASE) 50 MCG/ACT nasal spray; instill 1 spray into each nostril once daily  -     montelukast (SINGULAIR) 10 MG tablet; take 1 tablet by mouth once daily    Anxiety  -     FLUoxetine (PROZAC) 20 MG capsule; Take 2 capsules by mouth daily  -     busPIRone (BUSPAR) 5 MG tablet; Take 1 tablet by mouth 3 times daily as needed (anxiety)    Moderate episode of recurrent major depressive disorder (HCC)  -     FLUoxetine (PROZAC) 20 MG capsule; Take 2 capsules by mouth daily    Obesity, Class III, BMI 40-49.9 (morbid obesity) (HCC)  -     Omega-3 Fatty Acids (RA FISH OIL) 1000 MG CAPS; Take 1 capsule 3 times daily    Vitamin D deficiency  -     vitamin D (RA VITAMIN D-3) 125 MCG (5000 UT) CAPS capsule; take 1 capsule by mouth once daily    Panic disorder with agoraphobia  -     Vitamins-Lipotropics (BALANCED B-100 COMPLEX CR) TBCR; Take 1 tablet by mouth 4 times daily (after meals and at bedtime)          Return if symptoms worsen or fail to improve. Orders Placed:  No orders of the defined types were placed in this encounter.     Medications Prescribed:  Orders Placed This Encounter   Medications    levonorgestrel-ethinyl estradiol (NORDETTE) 0.15-30 MG-MCG per tablet     Sig: One daily     Dispense:  112 tablet     Refill:  0    SUMAtriptan (IMITREX) 50 MG tablet     Sig: take 1 tablet by mouth if needed AT ONSET OF HEADACHE may repeat in 2 hours IF headache PERSISTS maximum daily dose of 2 tablets ( 100 milligrams ) every 24 hours     Dispense:  9 tablet     Refill:  1    fluticasone (FLONASE) 50 MCG/ACT nasal spray     Sig: instill 1 spray into each nostril once daily     Dispense:  1 each     Refill:  3    montelukast (SINGULAIR) 10 MG tablet     Sig: take 1 tablet by mouth once daily     Dispense:  180 tablet     Refill:  1    FLUoxetine (PROZAC) 20 MG capsule     Sig: Take 2 capsules by mouth daily     Dispense:  60 capsule     Refill:  3    topiramate (TOPAMAX) 50 MG tablet     Sig: take 1 tablet by mouth at bedtime. Dispense:  60 tablet     Refill:  1    busPIRone (BUSPAR) 5 MG tablet     Sig: Take 1 tablet by mouth 3 times daily as needed (anxiety)     Dispense:  90 tablet     Refill:  5    Omega-3 Fatty Acids (RA FISH OIL) 1000 MG CAPS     Sig: Take 1 capsule 3 times daily     Dispense:  90 capsule     Refill:  5    vitamin D (RA VITAMIN D-3) 125 MCG (5000 UT) CAPS capsule     Sig: take 1 capsule by mouth once daily     Dispense:  30 capsule     Refill:  5    Vitamins-Lipotropics (BALANCED B-100 COMPLEX CR) TBCR     Sig: Take 1 tablet by mouth 4 times daily (after meals and at bedtime)     Dispense:  120 tablet     Refill:  5     No future appointments. Patient given educational materials - see patient instructions. Discussed use, benefit, and side effects of prescribedmedications. All patient questions answered. Pt voiced understanding. Reviewed health maintenance. Instructed to continue current medications, diet and exercise. Patient agreed with treatment plan. Follow up as directed. Kristine Solitario is a 27 y.o. female being evaluated by a Virtual Visit (video visit) encounter to address concerns as mentioned above. A caregiver was present when appropriate. Due to this being a TeleHealth encounter (During FGLQX-72 public health emergency). Evaluation of the following organ systems was limited: Vitals/Constitutional/EENT/Resp/CV/GI//MS/Neuro/Skin/Heme-Lymph-Imm.     Pursuant to the emergency declaration under the [facility] waiver authority and the [employer] Appropriations Act, this Virtual Visit was conducted with patient's (and/or legal guardian's) consent, to reduce the patient's risk of exposure to COVID-19 and provide necessary medical care. The patient (and/or legal guardian) has also been advised to contact this office for worsening conditions or problems, and seek emergency medical treatment and/or call 911 if deemed necessary. Services were provided through a video synchronous discussion virtually to substitute for in-person clinic visit. Patient and provider were located at their individual homes. --EARL Burt CNP on 12/14/2021 at 10:21 AM    An electronic signature was used to authenticate this note.      Electronically signed by EARL Burt CNP on 12/14/2021 at 10:21 AM

## 2021-12-14 NOTE — ED PROVIDER NOTES
tablet, take 1 tablet by mouth if needed AT ONSET OF HEADACHE may repeat in 2 hours IF headache PERSISTS maximum daily dose of 2 tablets ( 100 milligrams ) every 24 hours, Disp: 9 tablet, Rfl: 1    fluticasone (FLONASE) 50 MCG/ACT nasal spray, instill 1 spray into each nostril once daily, Disp: 1 each, Rfl: 3    montelukast (SINGULAIR) 10 MG tablet, take 1 tablet by mouth once daily, Disp: 180 tablet, Rfl: 1    FLUoxetine (PROZAC) 20 MG capsule, Take 2 capsules by mouth daily, Disp: 60 capsule, Rfl: 3    topiramate (TOPAMAX) 50 MG tablet, take 1 tablet by mouth at bedtime. , Disp: 60 tablet, Rfl: 1    busPIRone (BUSPAR) 5 MG tablet, Take 1 tablet by mouth 3 times daily as needed (anxiety), Disp: 90 tablet, Rfl: 5    Omega-3 Fatty Acids (RA FISH OIL) 1000 MG CAPS, Take 1 capsule 3 times daily, Disp: 90 capsule, Rfl: 5    vitamin D (RA VITAMIN D-3) 125 MCG (5000 UT) CAPS capsule, take 1 capsule by mouth once daily, Disp: 30 capsule, Rfl: 5    Vitamins-Lipotropics (BALANCED B-100 COMPLEX CR) TBCR, Take 1 tablet by mouth 4 times daily (after meals and at bedtime), Disp: 120 tablet, Rfl: 5    Cinnamon 500 MG CAPS, Take four times daily before meals and at bedtime, Disp: 120 capsule, Rfl: 5    Saline 0.2 % SOLN, by Nasal route, Disp: , Rfl:     guaiFENesin (MUCINEX) 600 MG extended release tablet, Take 1 tablet by mouth 2 times daily, Disp: 20 tablet, Rfl: 1    Elastic Bandages & Supports (B & B ELASTIC ANKLE BRACE) MISC, Wear brace when up walking during the day, Disp: 1 each, Rfl: 0      SOCIAL HISTORY     Social History     Social History Narrative    Not on file     Social History     Tobacco Use    Smoking status: Former Smoker     Quit date: 5/25/2006     Years since quitting: 15.5    Smokeless tobacco: Never Used   Vaping Use    Vaping Use: Never used   Substance Use Topics    Alcohol use: No    Drug use: No         ALLERGIES     Allergies   Allergen Reactions    Latex Rash     And itching    physical examination unremarkable, apart from dry mucosa. Given shortness of breath associated with obesity is a risk factor we will add acute inflammatory reactants in order to perform severity classification of COVID-19. IV fluids as well as  symptomatic treatment will be started. a. Clinical impression  i. COVID-19 pneumonia  ii. Dehydration  Plan:    Fluids, Zofran, Toradol   CBC, BMP, troponin, chest x-ray, ferritin, LDH, CRP   Reassessment        ED RESULTS   Laboratory results:  Labs Reviewed   CBC WITH AUTO DIFFERENTIAL - Abnormal; Notable for the following components:       Result Value    WBC 4.5 (*)     Monocytes Absolute 0.3 (*)     All other components within normal limits   BASIC METABOLIC PANEL - Abnormal; Notable for the following components:    Chloride 96 (*)     BUN 5 (*)     All other components within normal limits   C-REACTIVE PROTEIN - Abnormal; Notable for the following components:    CRP 5.31 (*)     All other components within normal limits   FERRITIN - Abnormal; Notable for the following components:    Ferritin 589 (*)     All other components within normal limits   LACTATE DEHYDROGENASE - Abnormal; Notable for the following components:     (*)     All other components within normal limits   OSMOLALITY - Abnormal; Notable for the following components:    Osmolality Calc 266.8 (*)     All other components within normal limits   TROPONIN   LACTIC ACID, PLASMA   ANION GAP   GLOMERULAR FILTRATION RATE, ESTIMATED       Radiologic studies results:  XR CHEST PORTABLE   Final Result   Mild infiltrates are scattered throughout both lungs. **This report has been created using voice recognition software. It may contain minor errors which are inherent in voice recognition technology. **      Final report electronically signed by Dr Airam Presley on 12/14/2021 12:28 PM          ED Medications administered this visit:   Medications   0.9 % sodium chloride bolus (0 mLs IntraVENous Stopped 12/14/21 1401)   ondansetron (ZOFRAN) injection 4 mg (4 mg IntraVENous Given 12/14/21 1302)   ketorolac (TORADOL) injection 30 mg (30 mg IntraVENous Given 12/14/21 1302)         ED COURSE      Reassessment  34year-old patient, COVID-19 positive, currently with improvement in initial symptoms, tolerating air room with oxygen saturations above 94, adequate oxygen saturations with ambulation, mildly elevated acute inflammatory reactants, patient qualifies for monoclonal antibody therapy, information about therapy is given, patient agrees, an appointment is scheduled. Patient will be discharged with alarm signs, recommendations, follow-up with primary care physician, patient understands and agrees. Strict return precautions and follow up instructions were discussed with the patient prior to discharge, with which the patient agrees. MEDICATION CHANGES     Discharge Medication List as of 12/14/2021  1:47 PM      START taking these medications    Details   albuterol sulfate HFA (VENTOLIN HFA) 108 (90 Base) MCG/ACT inhaler Inhale 2 puffs into the lungs 4 times daily as needed for Wheezing, Disp-18 g, R-0Normal               FINAL DISPOSITION     Final diagnoses:   COVID-19   Dehydration     Condition: condition: good  Dispo: Discharge to home      This transcription was electronically signed. Parts of this transcriptions may have been dictated by use of voice recognition software and electronically transcribed, and parts may have been transcribed with the assistance of an ED scribe. The transcription may contain errors not detected in proofreading. Please refer to my supervising physician's documentation if my documentation differs.     Electronically Signed: Izabel Light MD, 12/14/21, 9:28 PM       Izabel Light MD  Resident  12/14/21 [id number]

## 2021-12-15 ENCOUNTER — CARE COORDINATION (OUTPATIENT)
Dept: CARE COORDINATION | Age: 31
End: 2021-12-15

## 2021-12-15 NOTE — CARE COORDINATION
One time outreach per protocol  Scheduled for [facility] tomorrow    Patient contacted regarding COVID-19 diagnosis and monoclonal antibody infusion follow up. Discussed COVID-19 related testing which was available at this time. Test results were positive. Patient informed of results, if available? Yes. Ambulatory Care Manager contacted the patient by telephone to perform post discharge assessment. Call within 2 business days of discharge: Yes. Verified name and  with patient as identifiers. Provided introduction to self, and explanation of the CTN/ACM role, and reason for call due to risk factors for infection and/or exposure to COVID-19. Symptoms reviewed with patient who verbalized the following symptoms: fatigue, cough, no new symptoms and no worsening symptoms. Due to no new or worsening symptoms encounter was not routed to provider for escalation. Discussed follow-up appointments. If no appointment was previously scheduled, appointment scheduling offered: No.  [facility] follow up appointment(s):   Future Appointments   Date Time Provider Jennifer Palacio   2021  2:00 PM STR EXAM ROOM 2 COVID INFUSION STRZ OP NURS [facility] HOD     Non-[facility] follow up appointment(s): encouraged to call PCP office to set up Luciano Rajput [facility] 1237 for follow up and sx management    Non-face-to-face services provided:  Obtained and reviewed discharge summary and/or continuity of care documents     Advance Care Planning:   Does patient have an Advance Directive:  reviewed and current. Educated patient about risk for severe COVID-19 due to risk factors according to CDC guidelines. ACM reviewed discharge instructions, medical action plan and red flag symptoms with the patient who verbalized understanding. Discussed COVID vaccination status: No. Education provided on COVID-19 vaccination as appropriate. Discussed exposure protocols and quarantine with CDC Guidelines.  Patient was given an opportunity to verbalize any questions and concerns and agrees to contact ACM or health care provider for questions related to their healthcare. Reviewed and educated patient on any new and changed medications related to discharge diagnosis     Was patient discharged with a pulse oximeter? No Discussed and confirmed pulse oximeter discharge instructions and when to notify provider or seek emergency care. ACM provided contact information. No further follow-up call identified based on severity of symptoms and risk factors.

## 2021-12-16 ENCOUNTER — HOSPITAL ENCOUNTER (OUTPATIENT)
Dept: NURSING | Age: 31
Discharge: HOME | End: 2021-12-16
Payer: MEDICARE

## 2021-12-16 ENCOUNTER — TELEPHONE (OUTPATIENT)
Dept: FAMILY MEDICINE CLINIC | Age: 31
End: 2021-12-16

## 2021-12-16 VITALS
SYSTOLIC BLOOD PRESSURE: 146 MMHG | TEMPERATURE: 96.7 F | OXYGEN SATURATION: 97 % | DIASTOLIC BLOOD PRESSURE: 71 MMHG | RESPIRATION RATE: 20 BRPM | HEART RATE: 78 BPM

## 2021-12-16 DIAGNOSIS — U07.1 COVID-19: Primary | ICD-10-CM

## 2021-12-16 PROCEDURE — 6360000002 HC RX W HCPCS: Performed by: STUDENT IN AN ORGANIZED HEALTH CARE EDUCATION/TRAINING PROGRAM

## 2021-12-16 PROCEDURE — M0245 HC IV INFUSION BAMLANIVIMAB & ETESEVIMAB W/MONITORING: HCPCS

## 2021-12-16 PROCEDURE — 2580000003 HC RX 258: Performed by: STUDENT IN AN ORGANIZED HEALTH CARE EDUCATION/TRAINING PROGRAM

## 2021-12-16 PROCEDURE — 2500000003 HC RX 250 WO HCPCS: Performed by: STUDENT IN AN ORGANIZED HEALTH CARE EDUCATION/TRAINING PROGRAM

## 2021-12-16 RX ORDER — ALBUTEROL SULFATE 2.5 MG/3ML
2.5 SOLUTION RESPIRATORY (INHALATION) EVERY 6 HOURS PRN
Qty: 120 EACH | Refills: 3 | Status: SHIPPED | OUTPATIENT
Start: 2021-12-16 | End: 2022-01-21 | Stop reason: SDUPTHER

## 2021-12-16 RX ORDER — SODIUM CHLORIDE 0.9 % (FLUSH) 0.9 %
5-40 SYRINGE (ML) INJECTION EVERY 12 HOURS SCHEDULED
Status: DISCONTINUED | OUTPATIENT
Start: 2021-12-16 | End: 2021-12-17 | Stop reason: HOSPADM

## 2021-12-16 RX ORDER — NEBULIZER ACCESSORIES
1 KIT MISCELLANEOUS DAILY PRN
Qty: 1 KIT | Refills: 0 | Status: SHIPPED | OUTPATIENT
Start: 2021-12-16

## 2021-12-16 RX ORDER — SODIUM CHLORIDE 9 MG/ML
25 INJECTION, SOLUTION INTRAVENOUS PRN
Status: DISCONTINUED | OUTPATIENT
Start: 2021-12-16 | End: 2021-12-17 | Stop reason: HOSPADM

## 2021-12-16 RX ORDER — METHYLPREDNISOLONE SODIUM SUCCINATE 125 MG/2ML
125 INJECTION, POWDER, LYOPHILIZED, FOR SOLUTION INTRAMUSCULAR; INTRAVENOUS
Status: ACTIVE | OUTPATIENT
Start: 2021-12-16 | End: 2021-12-16

## 2021-12-16 RX ORDER — SODIUM CHLORIDE 9 MG/ML
100 INJECTION, SOLUTION INTRAVENOUS CONTINUOUS PRN
Status: DISCONTINUED | OUTPATIENT
Start: 2021-12-16 | End: 2021-12-17 | Stop reason: HOSPADM

## 2021-12-16 RX ORDER — SODIUM CHLORIDE 9 MG/ML
INJECTION, SOLUTION INTRAVENOUS CONTINUOUS PRN
Status: ACTIVE | OUTPATIENT
Start: 2021-12-16 | End: 2021-12-16

## 2021-12-16 RX ORDER — DIPHENHYDRAMINE HYDROCHLORIDE 50 MG/ML
50 INJECTION INTRAMUSCULAR; INTRAVENOUS
Status: ACTIVE | OUTPATIENT
Start: 2021-12-16 | End: 2021-12-16

## 2021-12-16 RX ORDER — SODIUM CHLORIDE 0.9 % (FLUSH) 0.9 %
5-40 SYRINGE (ML) INJECTION PRN
Status: DISCONTINUED | OUTPATIENT
Start: 2021-12-16 | End: 2021-12-17 | Stop reason: HOSPADM

## 2021-12-16 RX ADMIN — SODIUM CHLORIDE: 9 INJECTION, SOLUTION INTRAVENOUS at 13:44

## 2021-12-16 ASSESSMENT — PAIN - FUNCTIONAL ASSESSMENT: PAIN_FUNCTIONAL_ASSESSMENT: 0-10

## 2021-12-16 NOTE — TELEPHONE ENCOUNTER
Pt called states that she is using her inhaler and she is hearing a cracking sound when she breaths. She is using a pulse ox, pt states in the morning her pulse ox is showing 87-88%, but during the day they are 94-95%. Pt is requesting extra help other than the inhaler, like breathing treatments. SOB of breath with bending over. Please advise. Pharmacy [facility] [address]

## 2021-12-16 NOTE — TELEPHONE ENCOUNTER
Please call and see how she is feeling after infusion. Can sometimes feel bad after receiving this. If her shortness of breath is worse then she needs to go to ER.  I will send a nebulizer for her to use, but again if her breathing is getting progressively worse she needs to be seen in ER

## 2021-12-16 NOTE — TELEPHONE ENCOUNTER
----- Message from Eun Mario sent at 12/15/2021  4:18 PM EST -----  Subject: Message to Provider    QUESTIONS  Information for Provider? Pt wants to give the following information to   Pa Feliciano. Pt was seen at ER and they gave pt an inhaler and they did a chest   xray & labs, as well. Pt was given Toradol and Zofran while she was at ED. Pt recently picked up the inhaler and used it two or three times today,   but the inhaler hasn't really helped that much. she hasn't noticed much   difference. ---------------------------------------------------------------------------  --------------  Cathleen Arrow INFO  What is the best way for the office to contact you? OK to leave message on   voicemail  Preferred Call Back Phone Number? [phone]  ---------------------------------------------------------------------------  --------------  SCRIPT ANSWERS  Relationship to Patient?  Self

## 2021-12-16 NOTE — PROGRESS NOTES
1340        pt admitted to [facility] per ambulation for an monoclonal antibody infusion. Emergency use authorization form given  to patient for bamlanivimib and etesevimab . Verbalize understanding. Ok to proceed. PT RIGHTS AND RESPONSIBILITIES OFFERED TO PT.    1344 infusion in progress  1415 infusion completed. 1515  discharge instructions reviewed with patient. Verbalize understanding of home going instructions   1520 pt discharged per ambulation to home.                 _m___ Safety:       (Environmental)   [place] to environment   Ensure ID band is correct and in place/ allergy band as needed   Assess for fall risk   Initiate fall precautions as applicable (fall band, side rails, etc.)   Call light within reach   Bed in low position/ wheels locked    ___m_ Pain:        Assess pain level and characteristics   Administer analgesics as ordered   Assess effectiveness of pain management and report to MD as needed    __m__ Knowledge Deficit:   Assess baseline knowledge   Provide teaching at level of understanding   Provide teaching via preferred learning method   Evaluate teaching effectiveness    ___m_ Hemodynamic/Respiratory Status:       (Pre and Post Procedure Monitoring)   Assess/Monitor vital signs and LOC   Assess Baseline SpO2 prior to any sedation   Obtain weight/height   Assess vital signs/ LOC until patient meets discharge criteria   Monitor procedure site and notify MD of any issues

## 2021-12-16 NOTE — PROGRESS NOTES
__m__ Safety:       (Environmental)   [place] to environment   Ensure ID band is correct and in place/ allergy band as needed   Assess for fall risk   Initiate fall precautions as applicable (fall band, side rails, etc.)   Call light within reach   Bed in low position/ wheels locked    __m__ Pain:        Assess pain level and characteristics   Administer analgesics as ordered   Assess effectiveness of pain management and report to MD as needed    __m__ Knowledge Deficit:   Assess baseline knowledge   Provide teaching at level of understanding   Provide teaching via preferred learning method   Evaluate teaching effectiveness    ___m_ Hemodynamic/Respiratory Status:       (Pre and Post Procedure Monitoring)   Assess/Monitor vital signs and LOC   Assess Baseline SpO2 prior to any sedation   Obtain weight/height   Assess vital signs/ LOC until patient meets discharge criteria   Monitor procedure site and notify MD of any issues

## 2021-12-19 ENCOUNTER — TELEPHONE (OUTPATIENT)
Dept: FAMILY MEDICINE CLINIC | Age: 31
End: 2021-12-19

## 2021-12-19 ENCOUNTER — HOSPITAL ENCOUNTER (OUTPATIENT)
Age: 31
Setting detail: SPECIMEN
Discharge: HOME | End: 2021-12-19
Payer: MEDICARE

## 2021-12-19 DIAGNOSIS — N30.00 ACUTE CYSTITIS WITHOUT HEMATURIA: Primary | ICD-10-CM

## 2021-12-19 DIAGNOSIS — N30.00 ACUTE CYSTITIS WITHOUT HEMATURIA: ICD-10-CM

## 2021-12-19 LAB
BACTERIA: ABNORMAL
BILIRUBIN URINE: NEGATIVE
BLOOD, URINE: ABNORMAL
CASTS: ABNORMAL /LPF
CASTS: ABNORMAL /LPF
CHARACTER, URINE: CLEAR
COLOR: ABNORMAL
CRYSTALS: ABNORMAL
EPITHELIAL CELLS, UA: ABNORMAL /HPF
GLUCOSE, URINE: NEGATIVE MG/DL
KETONES, URINE: NEGATIVE
LEUKOCYTE EST, POC: ABNORMAL
MISCELLANEOUS LAB TEST RESULT: ABNORMAL
NITRITE, URINE: NEGATIVE
PH UA: 7 (ref 5–9)
PROTEIN UA: NEGATIVE MG/DL
RBC URINE: ABNORMAL /HPF
RENAL EPITHELIAL, UA: ABNORMAL
SPECIFIC GRAVITY UA: < 1.005 (ref 1–1.03)
UROBILINOGEN, URINE: 0.2 EU/DL (ref 0–1)
WBC UA: ABNORMAL /HPF
YEAST: ABNORMAL

## 2021-12-19 PROCEDURE — 87086 URINE CULTURE/COLONY COUNT: CPT

## 2021-12-19 PROCEDURE — 81003 URINALYSIS AUTO W/O SCOPE: CPT

## 2021-12-19 PROCEDURE — 81001 URINALYSIS AUTO W/SCOPE: CPT

## 2021-12-19 RX ORDER — SULFAMETHOXAZOLE AND TRIMETHOPRIM 800; 160 MG/1; MG/1
1 TABLET ORAL 2 TIMES DAILY
Qty: 10 TABLET | Refills: 0 | Status: SHIPPED | OUTPATIENT
Start: 2021-12-19 | End: 2021-12-24

## 2021-12-19 RX ORDER — PHENAZOPYRIDINE HYDROCHLORIDE 100 MG/1
100 TABLET, FILM COATED ORAL 3 TIMES DAILY PRN
Qty: 15 TABLET | Refills: 0 | Status: SHIPPED | OUTPATIENT
Start: 2021-12-19 | End: 2021-12-24

## 2021-12-19 NOTE — TELEPHONE ENCOUNTER
Heather called the after-hours line with complaints of urinary tract infection symptoms. She reports that this morning she awoke with dysuria, and urgency. States that when she goes to use the bathroom she gets discomfort and dribbling. She denies any fevers, chills, flank pain, hematuria, nausea, vomiting, diarrhea. No chest pain. No shortness of breath. She has had urinary tract infection in the past, and states that this feels similar. She is requesting an antibiotic and Pyridium for pain control. No other complaints at this time. She had a urinary tract infection with cultures positive for E. coli back in July. Susceptibilities done showing that the microbial was pansensitive. She was treated with nitrofurantoin at that time. Will start the patient on Bactrim for 5 days. Also ordered urinalysis with culture, and encourage patient to get this done. She does have follow-up appointment with her PCP in 2 days. Discussed risk/adverse effects of Bactrim. Patient understands. Discussed with patient need to go to emergency department if she develops new or worsening symptoms including fevers above 100.4, that is persistent despite antipyretics, worsening pain, flank pain, nausea vomiting no improvement in 48 to 72 hours.

## 2021-12-20 NOTE — TELEPHONE ENCOUNTER
Spoke to pt and she said that she is still feeling the same. Still has a sore throat, congestion, cough, and some SOB. She picked up the nebulizer and it has been helping more than the inhaler. Her SOB has improved.

## 2021-12-21 ENCOUNTER — OFFICE VISIT (OUTPATIENT)
Dept: FAMILY MEDICINE CLINIC | Age: 31
End: 2021-12-21
Payer: MEDICARE

## 2021-12-21 VITALS
RESPIRATION RATE: 18 BRPM | HEIGHT: 67 IN | DIASTOLIC BLOOD PRESSURE: 70 MMHG | OXYGEN SATURATION: 97 % | TEMPERATURE: 98.6 F | HEART RATE: 101 BPM | WEIGHT: 293 LBS | BODY MASS INDEX: 45.99 KG/M2 | SYSTOLIC BLOOD PRESSURE: 112 MMHG

## 2021-12-21 DIAGNOSIS — J02.9 SORE THROAT: ICD-10-CM

## 2021-12-21 DIAGNOSIS — J30.89 NON-SEASONAL ALLERGIC RHINITIS, UNSPECIFIED TRIGGER: ICD-10-CM

## 2021-12-21 DIAGNOSIS — R05.3 POST-COVID CHRONIC COUGH: Primary | ICD-10-CM

## 2021-12-21 DIAGNOSIS — U09.9 POST-COVID CHRONIC COUGH: Primary | ICD-10-CM

## 2021-12-21 LAB
ORGANISM: ABNORMAL
URINE CULTURE, ROUTINE: ABNORMAL

## 2021-12-21 PROCEDURE — 99213 OFFICE O/P EST LOW 20 MIN: CPT | Performed by: NURSE PRACTITIONER

## 2021-12-21 PROCEDURE — G8484 FLU IMMUNIZE NO ADMIN: HCPCS | Performed by: NURSE PRACTITIONER

## 2021-12-21 PROCEDURE — G8427 DOCREV CUR MEDS BY ELIG CLIN: HCPCS | Performed by: NURSE PRACTITIONER

## 2021-12-21 PROCEDURE — G8417 CALC BMI ABV UP PARAM F/U: HCPCS | Performed by: NURSE PRACTITIONER

## 2021-12-21 PROCEDURE — 1036F TOBACCO NON-USER: CPT | Performed by: NURSE PRACTITIONER

## 2021-12-21 RX ORDER — METHYLPREDNISOLONE 4 MG/1
TABLET ORAL
Qty: 1 KIT | Refills: 0 | Status: SHIPPED | OUTPATIENT
Start: 2021-12-21 | End: 2021-12-27

## 2021-12-21 RX ORDER — LORATADINE 10 MG/1
10 TABLET ORAL DAILY
Qty: 30 TABLET | Refills: 1 | Status: SHIPPED | OUTPATIENT
Start: 2021-12-21 | End: 2022-01-21 | Stop reason: SDUPTHER

## 2021-12-21 RX ORDER — GREEN TEA/HOODIA GORDONII 315-12.5MG
1 CAPSULE ORAL 2 TIMES DAILY
Qty: 60 TABLET | Refills: 0 | Status: SHIPPED | OUTPATIENT
Start: 2021-12-21 | End: 2022-01-20

## 2021-12-21 ASSESSMENT — ENCOUNTER SYMPTOMS
SORE THROAT: 1
EYE DISCHARGE: 0
CONSTIPATION: 0
BLOOD IN STOOL: 0
SHORTNESS OF BREATH: 1
COUGH: 1
NAUSEA: 0
EYE REDNESS: 0
SINUS PRESSURE: 1
ABDOMINAL DISTENTION: 0
DIARRHEA: 0
ANAL BLEEDING: 0
ABDOMINAL PAIN: 0
RHINORRHEA: 1
SINUS PAIN: 1
COLOR CHANGE: 0

## 2021-12-21 NOTE — PROGRESS NOTES
[address] [facility]  [phone] (phone)  [phone] (fax)    Visit Date: 12/21/2021    Maru Rollins is a 27 y.o. female who presents today for:  Chief Complaint   Patient presents with    Post-COVID Symptoms     check up from COVID sore throat, cough, SOB, congestion    Discuss Medications     probiotics     HPI:     Positive COVID-19 - 12/10/2021    Taking bactrim for UTI    Lingering cough, PND, sob with activity, congestion, and sore throat    Using nebulizer    HPI  Health Maintenance   Topic Date Due    Hepatitis C screen  Never done    Varicella vaccine (1 of 2 - 2-dose childhood series) Never done    COVID-19 Vaccine (1) Never done    DTaP/Tdap/Td vaccine (1 - Tdap) Never done    Flu vaccine (1) Never done    Cervical cancer screen  05/21/2022    HIV screen  Completed    Hepatitis A vaccine  Aged Out    Hepatitis B vaccine  Aged Out    Hib vaccine  Aged Out    Meningococcal (ACWY) vaccine  Aged Out    Pneumococcal 0-64 years Vaccine  Aged Out     Past Medical History:   Diagnosis Date    Anxiety 06/2016    COVID-19     Postpartum depression 2010      Past Surgical History:   Procedure Laterality Date    WISDOM TOOTH EXTRACTION  2006     Family History   Problem Relation Age of Onset   Sherley Alu Miscarriages / [place] Mother     High Blood Pressure Father     High Blood Pressure Brother     Diabetes Maternal Grandmother      Social History     Tobacco Use    Smoking status: Former Smoker     Quit date: 5/25/2006     Years since quitting: 15.5    Smokeless tobacco: Never Used   Substance Use Topics    Alcohol use: No      Current Outpatient Medications   Medication Sig Dispense Refill    Zinc Sulfate (ZINC-220 PO) Take by mouth      Probiotic Acidophilus (FLORANEX) TABS Take 1 tablet by mouth 2 times daily 60 tablet 0    methylPREDNISolone (MEDROL DOSEPACK) 4 MG tablet Take by mouth.  1 kit 0    loratadine (CLARITIN) 10 MG tablet Take 1 tablet by mouth daily 30 tablet 1    Magic Mouthwash (MIRACLE MOUTHWASH) Swish and spit 5 mLs 4 times daily as needed for Irritation 1 each 0    sulfamethoxazole-trimethoprim (BACTRIM DS;SEPTRA DS) 800-160 MG per tablet Take 1 tablet by mouth 2 times daily for 5 days 10 tablet 0    phenazopyridine (PYRIDIUM) 100 MG tablet Take 1 tablet by mouth 3 times daily as needed for Pain 15 tablet 0    albuterol (PROVENTIL) (2.5 MG/3ML) 0.083% nebulizer solution Take 3 mLs by nebulization every 6 hours as needed for Wheezing 120 each 3    Respiratory Therapy Supplies (NEBULIZER/TUBING/MOUTHPIECE) KIT 1 kit by Does not apply route daily as needed (wheezing) 1 kit 0    levonorgestrel-ethinyl estradiol (NORDETTE) 0.15-30 MG-MCG per tablet One daily 112 tablet 0    SUMAtriptan (IMITREX) 50 MG tablet take 1 tablet by mouth if needed AT ONSET OF HEADACHE may repeat in 2 hours IF headache PERSISTS maximum daily dose of 2 tablets ( 100 milligrams ) every 24 hours 9 tablet 1    fluticasone (FLONASE) 50 MCG/ACT nasal spray instill 1 spray into each nostril once daily 1 each 3    montelukast (SINGULAIR) 10 MG tablet take 1 tablet by mouth once daily 180 tablet 1    FLUoxetine (PROZAC) 20 MG capsule Take 2 capsules by mouth daily 60 capsule 3    topiramate (TOPAMAX) 50 MG tablet take 1 tablet by mouth at bedtime.  60 tablet 1    busPIRone (BUSPAR) 5 MG tablet Take 1 tablet by mouth 3 times daily as needed (anxiety) 90 tablet 5    Omega-3 Fatty Acids (RA FISH OIL) 1000 MG CAPS Take 1 capsule 3 times daily 90 capsule 5    vitamin D (RA VITAMIN D-3) 125 MCG (5000 UT) CAPS capsule take 1 capsule by mouth once daily 30 capsule 5    Vitamins-Lipotropics (BALANCED B-100 COMPLEX CR) TBCR Take 1 tablet by mouth 4 times daily (after meals and at bedtime) 120 tablet 5    Cinnamon 500 MG CAPS Take four times daily before meals and at bedtime 120 capsule 5    Saline 0.2 % SOLN by Nasal route      guaiFENesin (MUCINEX) 600 MG extended release tablet Take 1 tablet by mouth 2 times daily 20 tablet 1    Elastic Bandages & Supports (B & B ELASTIC ANKLE BRACE) MISC Wear brace when up walking during the day 1 each 0    albuterol sulfate HFA (VENTOLIN HFA) 108 (90 Base) MCG/ACT inhaler Inhale 2 puffs into the lungs 4 times daily as needed for Wheezing (Patient not taking: Reported on 12/21/2021) 18 g 0     No current facility-administered medications for this visit. Allergies   Allergen Reactions    Latex Rash     And itching    Adhesive Tape Rash       Subjective:    Review of Systems   Constitutional: Positive for fatigue. Negative for chills and fever. HENT: Positive for postnasal drip, rhinorrhea, sinus pressure, sinus pain and sore throat. Negative for congestion and ear pain. Eyes: Negative for discharge and redness. Respiratory: Positive for cough and shortness of breath. Cardiovascular: Negative for chest pain and leg swelling. Gastrointestinal: Negative for abdominal distention, abdominal pain, anal bleeding, blood in stool, constipation, diarrhea and nausea. Skin: Negative for color change and rash. Neurological: Negative for facial asymmetry, speech difficulty and weakness. Hematological: Does not bruise/bleed easily. Psychiatric/Behavioral: Negative for agitation and confusion. Objective:     Vitals:    12/21/21 0813   BP: 112/70   Site: Left Upper Arm   Position: Sitting   Cuff Size: Large Adult   Pulse: 101   Resp: 18   Temp: 98.6 °F (37 °C)   TempSrc: Oral   SpO2: 97%   Weight: (!) 305 lb 12.8 oz (138.7 kg)   Height: 5' 7\" (1.702 m)       Body mass index is 47.9 kg/m². Wt Readings from Last 3 Encounters:   12/21/21 (!) 305 lb 12.8 oz (138.7 kg)   12/10/21 (!) 312 lb (141.5 kg)   12/02/21 (!) 312 lb 9.6 oz (141.8 kg)     BP Readings from Last 3 Encounters:   12/21/21 112/70   12/16/21 (!) 146/71   12/14/21 (!) 166/94     Physical Exam  Constitutional:       Appearance: Normal appearance. She is well-developed.  She is not toxic-appearing or diaphoretic. HENT:      Head: Normocephalic and atraumatic. Right Ear: Hearing normal.      Left Ear: Hearing normal.      Nose: No nasal deformity. Eyes:      General:         Right eye: No discharge. Left eye: No discharge. Pulmonary:      Effort: Pulmonary effort is normal. No prolonged expiration or respiratory distress. Musculoskeletal:      Cervical back: Normal range of motion. Skin:     Findings: No rash (On exposed areas visible on camera). Neurological:      Mental Status: She is alert and oriented to person, place, and time. Psychiatric:         Attention and Perception: Attention normal.         Mood and Affect: Mood normal.         Speech: Speech normal.         Behavior: Behavior normal.         Thought Content: Thought content normal.         Cognition and Memory: Cognition and memory normal.         Judgment: Judgment normal.         Lab Results   Component Value Date    WBC 4.5 (L) 12/14/2021    HGB 14.0 12/14/2021    HCT 41.9 12/14/2021     12/14/2021    CHOL 181 01/29/2019    TRIG 161 01/29/2019    HDL 45 01/29/2019    LDLCALC 104 01/29/2019    AST 58 (H) 11/30/2021     12/14/2021    K 3.9 12/14/2021    CL 96 (L) 12/14/2021    CREATININE 0.6 12/14/2021    BUN 5 (L) 12/14/2021    CO2 26 12/14/2021    TSH 2.890 01/29/2019    LABA1C 5.3 09/22/2016    LABGLOM >90 12/14/2021    MG 2.0 01/29/2019    CALCIUM 8.8 12/14/2021    VITD25 24 (L) 01/29/2019     Assessment:       Diagnosis Orders   1. Post-COVID chronic cough  Probiotic Acidophilus (FLORANEX) TABS    methylPREDNISolone (MEDROL DOSEPACK) 4 MG tablet   2. Sore throat  Magic Mouthwash (MIRACLE MOUTHWASH)   3. Non-seasonal allergic rhinitis, unspecified trigger  loratadine (CLARITIN) 10 MG tablet       Plan:   Heather was seen today for post-covid symptoms and discuss medications.     Diagnoses and all orders for this visit:    Post-COVID chronic cough  -     Probiotic Acidophilus [facility])

## 2021-12-21 NOTE — PROGRESS NOTES
Health Maintenance Due   Topic Date Due    Hepatitis C screen  Never done    Varicella vaccine (1 of 2 - 2-dose childhood series) Never done    COVID-19 Vaccine (1) Never done    DTaP/Tdap/Td vaccine (1 - Tdap) Never done    Flu vaccine (1) Never done

## 2021-12-21 NOTE — PATIENT INSTRUCTIONS
Patient Education      Claritin in the morning  Singulair at night    Steroids as directed until gone    Use spirometer 3 times daily    Back in 6 months, sooner as needed  Allergies: Care Instructions  Overview     Allergies occur when your body's defense system (immune system) overreacts to certain substances. The immune system treats a harmless substance as if it were a harmful germ or virus. Many things can make this happen. These include pollens, medicine, food, dust, animal dander, and mold. Allergies can be mild or severe. Mild allergies can be managed with home treatment. But medicine may be needed to prevent problems. Managing your allergies is an important part of staying healthy. Your doctor may suggest that you have allergy testing to help find out what is causing your allergies. Severe allergies can cause reactions that affect your whole body (anaphylactic reactions). Your doctor may prescribe a shot of epinephrine to carry with you in case you have a severe reaction. Learn how to give yourself the shot and keep it with you at all times. Make sure it is not . Follow-up care is a key part of your treatment and safety. Be sure to make and go to all appointments, and call your doctor if you are having problems. It's also a good idea to know your test results and keep a list of the medicines you take. How can you care for yourself at home? · If you have been told by your doctor that dust or dust mites are causing your allergy, decrease the dust around your bed:  ? Wash sheets, pillowcases, and other bedding in hot water every week. ? Use dust-proof covers for pillows, duvets, and mattresses. Avoid plastic covers because they tear easily and do not \"breathe. \" Wash as instructed on the label. ? Do not use any blankets and pillows that you do not need. ? Use blankets that you can wash in your washing machine. ?  Consider removing drapes and carpets, which attract and hold dust, from your bedroom. · If you are allergic to house dust and mites, do not use home humidifiers. Your doctor can suggest ways you can control dust and mites. · Look for signs of cockroaches. Cockroaches cause allergic reactions. Use cockroach baits to get rid of them. Then, clean your home well. Cockroaches like areas where grocery bags, newspapers, empty bottles, or cardboard boxes are stored. Do not keep these inside your home, and keep trash and food containers sealed. Seal off any spots where cockroaches might enter your home. · If you are allergic to mold, get rid of furniture, rugs, and drapes that smell musty. Check for mold in the bathroom. · If you are allergic to outdoor pollen or mold spores, use air-conditioning. Change or clean all filters every month. Keep windows closed. · If you are allergic to pollen, stay inside when pollen counts are high. Use a vacuum  with a HEPA filter or a double-thickness filter at least two times each week. · Stay inside when air pollution is bad. Avoid paint fumes, perfumes, and other strong odors. · Avoid conditions that make your allergies worse. Stay away from smoke. Do not smoke or let anyone else smoke in your house. Do not use fireplaces or wood-burning stoves. · If you are allergic to your pets, change the air filter in your furnace every month. Use high-efficiency filters. · If you are allergic to pet dander, keep pets outside or out of your bedroom. Old carpet and cloth furniture can hold a lot of animal dander. You may need to replace them. When should you call for help? Give an epinephrine shot if:    · You think you are having a severe allergic reaction.     · You have symptoms in more than one body area, such as mild nausea and an itchy mouth. After giving an epinephrine shot call 911, even if you feel better. Call 911 if:    · You have symptoms of a severe allergic reaction.  These may include:  ? Sudden raised, red areas (hives) all over your body.  ? Swelling of the throat, mouth, lips, or tongue. ? Trouble breathing. ? Passing out (losing consciousness). Or you may feel very lightheaded or suddenly feel weak, confused, or restless.     · You have been given an epinephrine shot, even if you feel better. Call your doctor now or seek immediate medical care if:    · You have symptoms of an allergic reaction, such as:  ? A rash or hives (raised, red areas on the skin). ? Itching. ? Swelling. ? Belly pain, nausea, or vomiting. Watch closely for changes in your health, and be sure to contact your doctor if:    · You do not get better as expected. Where can you learn more? Go to [email or web address] org and sign in to your Gooddler account. Enter L134 in the [employer] box to learn more about \"Allergies: Care Instructions. \"     If you do not have an account, please click on the \"Sign Up Now\" link. Current as of: February 10, 2021               Content Version: 13.0  © 2006-2021 dELiAs. Care instructions adapted under license by [facility] ([facility]). If you have questions about a medical condition or this instruction, always ask your healthcare professional. [place] [address] any warranty or liability for your use of this information. Patient Education        Rhinitis: Care Instructions  Your Care Instructions  Rhinitis is swelling and irritation in the nose. Allergies and infections are often the cause. Your nose may run or feel stuffy. Other symptoms are itchy and sore eyes, ears, throat, and mouth. If allergies are the cause, your doctor may do tests to find out what you are allergic to. You may be able to stop symptoms if you avoid the things that cause them. Your doctor may suggest or prescribe medicine to ease your symptoms. Follow-up care is a key part of your treatment and safety. Be sure to make and go to all appointments, and call your doctor if you are having problems. It's also a good idea to know your test results and keep a list of the medicines you take. How can you care for yourself at home? · If your rhinitis is caused by allergies, try to find out what sets off (triggers) your symptoms. Take steps to avoid these triggers. ? Avoid yard work. It can stir up both pollen and mold. ? Do not smoke or allow others to smoke around you. If you need help quitting, talk to your doctor about stop-smoking programs and medicines. These can increase your chances of quitting for good. ? Do not use aerosol sprays, cleaning products, or perfumes. ? If pollen is one of your triggers, close your house and car windows during blooming season. ? Clean your house often to control dust.  ? Keep pets outside. · If your doctor recommends over-the-counter medicines to relieve symptoms, take your medicines exactly as prescribed. Call your doctor if you think you are having a problem with your medicine. · Use saline (saltwater) nasal washes to help keep your nasal passages open and wash out mucus and bacteria. You can buy saline nose drops at a grocery store or drugstore. Or you can make your own at home by adding 1 teaspoon of salt and 1 teaspoon of baking soda to 2 cups of distilled water. If you make your own, fill a bulb syringe with the solution, insert the tip into your nostril, and squeeze gently. [place] your nose. When should you call for help? Call your doctor now or seek immediate medical care if:    · You are having trouble breathing. Watch closely for changes in your health, and be sure to contact your doctor if:    · Mucus from your nose gets thicker (like pus) or has new blood in it.     · You have new or worse symptoms.     · You do not get better as expected. Where can you learn more? Go to [email or web address]://[email or web address].[email or web address] and sign in to your Sc[employer]crow [employer] account. Enter M030 in the [facility] box to learn more about \"Rhinitis: Care Instructions. \"     If you do not have an account, please click on the \"Sign Up Now\" link. Current as of: December 2, 2020               Content Version: 13.0  © 2006-2021 Healthwise, Parsimotion. Care instructions adapted under license by [facility] ([facility]). If you have questions about a medical condition or this instruction, always ask your healthcare professional. [address] any warranty or liability for your use of this information. Patient Education        Learning About COVID-19 and Flu Symptoms  How can you tell COVID-19 from the flu? COVID-19 and the flu have similar symptoms. The two can be hard to tell apart. The only way to know for sure which illness you have is to be tested. Since the symptoms are so alike, it makes sense to act as if you have COVID-19 until your test results come back. This means staying home and limiting contact with people in your home. You'll need to wash your hands often and disinfect surfaces that you touch. And be sure to wear a mask when you're around other people. This is also good advice if you think you have the flu. COVID-19 and the flu have these symptoms in common:  · Fever or chills  · Cough  · Shortness of breath  · Fatigue (tiredness)  · Sore throat  · Runny or stuffy nose  · Muscle and body aches  · Headache  · Vomiting and diarrhea (more common in children than adults)  COVID-19 has another symptom that also may occur:  · New loss of taste or smell  COVID-19 symptoms may appear from 2 to 14 days after infection. Flu symptoms usually appear 1 to 4 days after infection. Why should you get a flu shot during the COVID-19 pandemic? It's important to get your yearly flu vaccine. Both the flu and COVID-19 can be active at the same time. You can get sick with both infections at once. And having both may make you more sick than getting just one. The flu vaccine won't protect you from COVID-19. But it can help prevent the flu or reduce its symptoms.  If fewer people get very ill with the flu, this will help free up medical resources that are needed for COVID-19 patients. Where can you learn more? Go to [email or web address] and sign in to your dondeEstaâ„¢ account. Enter C123 in the [employer] box to learn more about \"Learning About COVID-19 and Flu Symptoms. \"     If you do not have an account, please click on the \"Sign Up Now\" link. Current as of: March 26, 2021               Content Version: 13.0  © 7930-7253 Healthwise, Incorporated. Care instructions adapted under license by [facility] ([facility]). If you have questions about a medical condition or this instruction, always ask your healthcare professional. [address] any warranty or liability for your use of this information.

## 2022-01-21 ENCOUNTER — HOSPITAL ENCOUNTER (OUTPATIENT)
Dept: GENERAL RADIOLOGY | Age: 32
Discharge: HOME | End: 2022-01-21
Payer: MEDICARE

## 2022-01-21 ENCOUNTER — OFFICE VISIT (OUTPATIENT)
Dept: FAMILY MEDICINE CLINIC | Age: 32
End: 2022-01-21
Payer: MEDICARE

## 2022-01-21 ENCOUNTER — HOSPITAL ENCOUNTER (OUTPATIENT)
Age: 32
Discharge: HOME | End: 2022-01-21
Payer: MEDICARE

## 2022-01-21 VITALS
TEMPERATURE: 96.9 F | SYSTOLIC BLOOD PRESSURE: 128 MMHG | WEIGHT: 293 LBS | RESPIRATION RATE: 20 BRPM | OXYGEN SATURATION: 98 % | HEART RATE: 98 BPM | HEIGHT: 67 IN | DIASTOLIC BLOOD PRESSURE: 80 MMHG | BODY MASS INDEX: 45.99 KG/M2

## 2022-01-21 DIAGNOSIS — R05.9 COUGH: ICD-10-CM

## 2022-01-21 DIAGNOSIS — U07.1 COVID-19: ICD-10-CM

## 2022-01-21 DIAGNOSIS — U09.9 POST-COVID CHRONIC COUGH: ICD-10-CM

## 2022-01-21 DIAGNOSIS — R05.9 COUGH: Primary | ICD-10-CM

## 2022-01-21 DIAGNOSIS — G43.821 MENSTRUAL MIGRAINE WITH STATUS MIGRAINOSUS, NOT INTRACTABLE: ICD-10-CM

## 2022-01-21 DIAGNOSIS — R05.3 POST-COVID CHRONIC COUGH: ICD-10-CM

## 2022-01-21 DIAGNOSIS — G43.109 MIGRAINE WITH AURA AND WITHOUT STATUS MIGRAINOSUS, NOT INTRACTABLE: ICD-10-CM

## 2022-01-21 DIAGNOSIS — N92.6 IRREGULAR MENSTRUATION: ICD-10-CM

## 2022-01-21 DIAGNOSIS — J30.89 NON-SEASONAL ALLERGIC RHINITIS, UNSPECIFIED TRIGGER: ICD-10-CM

## 2022-01-21 PROCEDURE — G8484 FLU IMMUNIZE NO ADMIN: HCPCS | Performed by: NURSE PRACTITIONER

## 2022-01-21 PROCEDURE — 71046 X-RAY EXAM CHEST 2 VIEWS: CPT

## 2022-01-21 PROCEDURE — 1036F TOBACCO NON-USER: CPT | Performed by: NURSE PRACTITIONER

## 2022-01-21 PROCEDURE — 99214 OFFICE O/P EST MOD 30 MIN: CPT | Performed by: NURSE PRACTITIONER

## 2022-01-21 PROCEDURE — G8417 CALC BMI ABV UP PARAM F/U: HCPCS | Performed by: NURSE PRACTITIONER

## 2022-01-21 PROCEDURE — G8427 DOCREV CUR MEDS BY ELIG CLIN: HCPCS | Performed by: NURSE PRACTITIONER

## 2022-01-21 RX ORDER — LORATADINE 10 MG/1
10 TABLET ORAL DAILY
Qty: 60 TABLET | Refills: 1 | Status: SHIPPED | OUTPATIENT
Start: 2022-01-21 | End: 2022-04-12

## 2022-01-21 RX ORDER — ALBUTEROL SULFATE 2.5 MG/3ML
2.5 SOLUTION RESPIRATORY (INHALATION) EVERY 6 HOURS PRN
Qty: 120 EACH | Refills: 3 | Status: SHIPPED | OUTPATIENT
Start: 2022-01-21 | End: 2022-05-11

## 2022-01-21 RX ORDER — AZITHROMYCIN 250 MG/1
TABLET, FILM COATED ORAL
Qty: 1 PACKET | Refills: 0 | Status: SHIPPED | OUTPATIENT
Start: 2022-01-21 | End: 2022-05-11 | Stop reason: ALTCHOICE

## 2022-01-21 RX ORDER — LEVONORGESTREL AND ETHINYL ESTRADIOL 0.15-0.03
KIT ORAL
Qty: 180 TABLET | Refills: 1 | Status: SHIPPED | OUTPATIENT
Start: 2022-01-21 | End: 2022-01-26 | Stop reason: SDUPTHER

## 2022-01-21 RX ORDER — SUMATRIPTAN 100 MG/1
TABLET, FILM COATED ORAL
Qty: 9 TABLET | Refills: 1 | Status: SHIPPED | OUTPATIENT
Start: 2022-01-21 | End: 2022-03-14

## 2022-01-21 RX ORDER — TOPIRAMATE 50 MG/1
TABLET, FILM COATED ORAL
Qty: 60 TABLET | Refills: 1 | Status: SHIPPED | OUTPATIENT
Start: 2022-01-21 | End: 2022-05-31

## 2022-01-21 ASSESSMENT — ENCOUNTER SYMPTOMS
ANAL BLEEDING: 0
BLOOD IN STOOL: 0
RHINORRHEA: 0
SORE THROAT: 0
EYE REDNESS: 0
EYE DISCHARGE: 0
ABDOMINAL PAIN: 0
DIARRHEA: 0
COUGH: 1
NAUSEA: 0
ABDOMINAL DISTENTION: 0
CONSTIPATION: 0
SHORTNESS OF BREATH: 0
COLOR CHANGE: 0

## 2022-01-21 NOTE — PATIENT INSTRUCTIONS
go to all appointments, and call your doctor if you are having problems. It's also a good idea to know your test results and keep a list of the medicines you take. How can you care for yourself at home? · If you have been told by your doctor that dust or dust mites are causing your allergy, decrease the dust around your bed:  ? Wash sheets, pillowcases, and other bedding in hot water every week. ? Use dust-proof covers for pillows, duvets, and mattresses. Avoid plastic covers because they tear easily and do not \"breathe. \" Wash as instructed on the label. ? Do not use any blankets and pillows that you do not need. ? Use blankets that you can wash in your washing machine. ? Consider removing drapes and carpets, which attract and hold dust, from your bedroom. · If you are allergic to house dust and mites, do not use home humidifiers. Your doctor can suggest ways you can control dust and mites. · Look for signs of cockroaches. Cockroaches cause allergic reactions. Use cockroach baits to get rid of them. Then, clean your home well. Cockroaches like areas where grocery bags, newspapers, empty bottles, or cardboard boxes are stored. Do not keep these inside your home, and keep trash and food containers sealed. Seal off any spots where cockroaches might enter your home. · If you are allergic to mold, get rid of furniture, rugs, and drapes that smell musty. Check for mold in the bathroom. · If you are allergic to outdoor pollen or mold spores, use air-conditioning. Change or clean all filters every month. Keep windows closed. · If you are allergic to pollen, stay inside when pollen counts are high. Use a vacuum  with a HEPA filter or a double-thickness filter at least two times each week. · Stay inside when air pollution is bad. Avoid paint fumes, perfumes, and other strong odors. · Avoid conditions that make your allergies worse. Stay away from smoke. Do not smoke or let anyone else smoke in your house. Do not use fireplaces or wood-burning stoves. · If you are allergic to your pets, change the air filter in your furnace every month. Use high-efficiency filters. · If you are allergic to pet dander, keep pets outside or out of your bedroom. Old carpet and cloth furniture can hold a lot of animal dander. You may need to replace them. When should you call for help? Give an epinephrine shot if:    · You think you are having a severe allergic reaction.     · You have symptoms in more than one body area, such as mild nausea and an itchy mouth. After giving an epinephrine shot call 911, even if you feel better. Call 911 if:    · You have symptoms of a severe allergic reaction. These may include:  ? Sudden raised, red areas (hives) all over your body. ? Swelling of the throat, mouth, lips, or tongue. ? Trouble breathing. ? Passing out (losing consciousness). Or you may feel very lightheaded or suddenly feel weak, confused, or restless.     · You have been given an epinephrine shot, even if you feel better. Call your doctor now or seek immediate medical care if:    · You have symptoms of an allergic reaction, such as:  ? A rash or hives (raised, red areas on the skin). ? Itching. ? Swelling. ? Belly pain, nausea, or vomiting. Watch closely for changes in your health, and be sure to contact your doctor if:    · You do not get better as expected. Where can you learn more? Go to [email or web address] and sign in to your [email or web address] account. Enter N330 in the [facility] box to learn more about \"Allergies: Care Instructions. \"     If you do not have an account, please click on the \"Sign Up Now\" link. Current as of: February 10, 2021               Content Version: 13.1  © 5286-4693 Healthwise, Incorporated. Care instructions adapted under license by [facility] ([facility]).  If you have questions about a medical condition or this instruction, always ask your healthcare professional. [place], [facility] disclaims any warranty or liability for your use of this information. Patient Education        Cough: Care Instructions  Your Care Instructions     A cough is your body's response to something that bothers your throat or airways. Many things can cause a cough. You might cough because of a cold or the flu, bronchitis, or asthma. Smoking, postnasal drip, allergies, and stomach acid that backs up into your throat also can cause coughs. A cough is a symptom, not a disease. Most coughs stop when the cause, such as a cold, goes away. You can take a few steps at home to cough less and feel better. Follow-up care is a key part of your treatment and safety. Be sure to make and go to all appointments, and call your doctor if you are having problems. It's also a good idea to know your test results and keep a list of the medicines you take. How can you care for yourself at home? · Drink lots of water and other fluids. This helps thin the mucus and soothes a dry or sore throat. Honey or lemon juice in hot water or tea may ease a dry cough. · Take cough medicine as directed by your doctor. · Prop up your head on pillows to help you breathe and ease a dry cough. · Try cough drops to soothe a dry or sore throat. Cough drops don't stop a cough. Medicine-flavored cough drops are no better than candy-flavored drops or hard candy. · Do not smoke. Avoid secondhand smoke. If you need help quitting, talk to your doctor about stop-smoking programs and medicines. These can increase your chances of quitting for good. When should you call for help? Call 911 anytime you think you may need emergency care. For example, call if:    · You have severe trouble breathing. Call your doctor now or seek immediate medical care if:    · You cough up blood.     · You have new or worse trouble breathing.     · You have a new or higher fever.     · You have a new rash.    Watch closely for changes in your health, and be sure to contact your doctor if:    · You cough more deeply or more often, especially if you notice more mucus or a change in the color of your mucus.     · You have new symptoms, such as a sore throat, an earache, or sinus pain.     · You do not get better as expected. Where can you learn more? Go to [email or web address] and sign in to your Emergent Properties account. Enter D279 in the MyCabbage box to learn more about \"Cough: Care Instructions. \"     If you do not have an account, please click on the \"Sign Up Now\" link. Current as of: July 6, 2021               Content Version: 13.1  © 2006-2021 Geekatoo. Care instructions adapted under license by [facility] ([facility]). If you have questions about a medical condition or this instruction, always ask your healthcare professional. [place] [address] any warranty or liability for your use of this information. Patient Education        Learning About Preventing COVID-19 When You Have a Weakened Immune System  What is coronavirus (COVID-19)? COVID-19 is a disease caused by a type of coronavirus. This illness was first found in December 2019. It has since spread worldwide. Coronaviruses are a large group of viruses. They cause the common cold. They also cause more serious illnesses like Middle East respiratory syndrome (MERS) and severe acute respiratory syndrome (SARS). COVID-19 is caused by a novel coronavirus. That means it's a new type that has not been seen in people before. What causes a weakened immune system? Your immune system may not work well because of certain medicines used to treat cancer or autoimmune disease. Taking steroid medicines for a long time can also weaken your immune system. Other causes include certain health problems, as well as immune problems that run in your family. Why is it important to take extra precautions?   A weakened immune system makes it more likely that you'll get very sick from COVID-19. And the COVID vaccine may not work as well for you. How can you protect yourself? Follow these guidelines until your doctor tells you it's safe to stop. · Get vaccinated, if you haven't already. · Talk to your doctor about whether you need an extra vaccine dose. · Avoid sick people. · Wear a mask if you go into public areas, especially indoor spaces. · Avoid crowds, and try to stay 6 feet apart from other people. If you have to be in a crowded area, wear a mask, even if you're outside. · Wash your hands often. · Avoid touching your mouth, nose, and eyes. · Ask the people you live with or who are in close contact with you to get vaccinated. · Ask the people you live with to wear a mask in public areas, even if they're fully vaccinated. If you think you've been exposed, call your doctor. You may need a COVID-19 test. Your doctor may want to give you medicine that can help prevent COVID-19. Where can you learn more? Go to [email or web address] and sign in to your Wynlink account. Enter C127 in the [employer] box to learn more about \"Learning About Preventing COVID-19 When You Have a Weakened Immune System. \"     If you do not have an account, please click on the \"Sign Up Now\" link. Current as of: October 6, 2021               Content Version: 13.1  © 2006-2021 Healthwise, Incorporated. Care instructions adapted under license by [facility] [place] ([facility]). If you have questions about a medical condition or this instruction, always ask your healthcare professional. [place] [address] any warranty or liability for your use of this information.

## 2022-01-21 NOTE — PROGRESS NOTES
(250 mg) on days 2 through 5. 1 packet 0    albuterol (PROVENTIL) (2.5 MG/3ML) 0.083% nebulizer solution Take 3 mLs by nebulization every 6 hours as needed for Wheezing 120 each 3    levonorgestrel-ethinyl estradiol (NORDETTE) 0.15-30 MG-MCG per tablet One daily 180 tablet 1    topiramate (TOPAMAX) 50 MG tablet take 1 tablet by mouth at bedtime.  60 tablet 1    SUMAtriptan (IMITREX) 100 MG tablet take 1 tablet by mouth if needed AT ONSET OF HEADACHE 9 tablet 1    loratadine (CLARITIN) 10 MG tablet Take 1 tablet by mouth daily Take 2 tables 60 tablet 1    Zinc Sulfate (ZINC-220 PO) Take by mouth      Magic Mouthwash (MIRACLE MOUTHWASH) Swish and spit 5 mLs 4 times daily as needed for Irritation 1 each 0    Respiratory Therapy Supplies (NEBULIZER/TUBING/MOUTHPIECE) KIT 1 kit by Does not apply route daily as needed (wheezing) 1 kit 0    fluticasone (FLONASE) 50 MCG/ACT nasal spray instill 1 spray into each nostril once daily 1 each 3    montelukast (SINGULAIR) 10 MG tablet take 1 tablet by mouth once daily 180 tablet 1    FLUoxetine (PROZAC) 20 MG capsule Take 2 capsules by mouth daily 60 capsule 3    busPIRone (BUSPAR) 5 MG tablet Take 1 tablet by mouth 3 times daily as needed (anxiety) 90 tablet 5    Omega-3 Fatty Acids (RA FISH OIL) 1000 MG CAPS Take 1 capsule 3 times daily 90 capsule 5    vitamin D (RA VITAMIN D-3) 125 MCG (5000 UT) CAPS capsule take 1 capsule by mouth once daily 30 capsule 5    Vitamins-Lipotropics (BALANCED B-100 COMPLEX CR) TBCR Take 1 tablet by mouth 4 times daily (after meals and at bedtime) 120 tablet 5    albuterol sulfate HFA (VENTOLIN HFA) 108 (90 Base) MCG/ACT inhaler Inhale 2 puffs into the lungs 4 times daily as needed for Wheezing 18 g 0    Cinnamon 500 MG CAPS Take four times daily before meals and at bedtime 120 capsule 5    Saline 0.2 % SOLN by Nasal route      guaiFENesin (MUCINEX) 600 MG extended release tablet Take 1 tablet by mouth 2 times daily 20 tablet 1    Elastic Bandages & Supports (B & B ELASTIC ANKLE BRACE) MISC Wear brace when up walking during the day 1 each 0     No current facility-administered medications for this visit. Allergies   Allergen Reactions    Latex Rash     And itching    Adhesive Tape Rash       Subjective:    Review of Systems   Constitutional: Negative for chills, fatigue and fever. HENT: Negative for congestion, ear pain, postnasal drip, rhinorrhea and sore throat. Eyes: Negative for discharge and redness. Respiratory: Positive for cough. Negative for shortness of breath. \"crackling in right lung\"   Cardiovascular: Negative for chest pain and leg swelling. Gastrointestinal: Negative for abdominal distention, abdominal pain, anal bleeding, blood in stool, constipation, diarrhea and nausea. Skin: Negative for color change and rash. Neurological: Negative for facial asymmetry, speech difficulty and weakness. Hematological: Does not bruise/bleed easily. Psychiatric/Behavioral: Negative for agitation and confusion. Objective:     Vitals:    01/21/22 0918   BP: 128/80   Site: Left Upper Arm   Position: Sitting   Cuff Size: Large Adult   Pulse: 98   Resp: 20   Temp: 96.9 °F (36.1 °C)   TempSrc: Skin   SpO2: 98%   Weight: (!) 315 lb 3.2 oz (143 kg)   Height: 5' 7\" (1.702 m)       Body mass index is 49.37 kg/m². Wt Readings from Last 3 Encounters:   01/21/22 (!) 315 lb 3.2 oz (143 kg)   12/21/21 (!) 305 lb 12.8 oz (138.7 kg)   12/10/21 (!) 312 lb (141.5 kg)     BP Readings from Last 3 Encounters:   01/21/22 128/80   12/21/21 112/70   12/16/21 (!) 146/71     Physical Exam  Constitutional:       General: She is not in acute distress. Appearance: She is well-developed. She is not ill-appearing or diaphoretic. HENT:      Head: Normocephalic and atraumatic. Right Ear: Hearing and external ear normal. No decreased hearing noted. Left Ear: Hearing and external ear normal. No decreased hearing noted. Nose: Nose normal. No nasal deformity. Eyes:      General:         Right eye: No discharge. Left eye: No discharge. Conjunctiva/sclera: Conjunctivae normal.   Pulmonary:      Effort: Pulmonary effort is normal. No respiratory distress. Abdominal:      General: There is no distension. Tenderness: There is no guarding. Musculoskeletal:         General: No tenderness or deformity. Normal range of motion. Cervical back: Normal range of motion and neck supple. Skin:     Coloration: Skin is not pale. Findings: No erythema or rash (On exposed areas). Neurological:      Mental Status: She is alert. Gait: Gait normal.   Psychiatric:         Speech: Speech normal.         Behavior: Behavior normal.         Thought Content: Thought content normal.         Judgment: Judgment normal.         Lab Results   Component Value Date    WBC 4.5 (L) 12/14/2021    HGB 14.0 12/14/2021    HCT 41.9 12/14/2021     12/14/2021    CHOL 181 01/29/2019    TRIG 161 01/29/2019    HDL 45 01/29/2019    LDLCALC 104 01/29/2019    AST 58 (H) 11/30/2021     12/14/2021    K 3.9 12/14/2021    CL 96 (L) 12/14/2021    CREATININE 0.6 12/14/2021    BUN 5 (L) 12/14/2021    CO2 26 12/14/2021    TSH 2.890 01/29/2019    LABA1C 5.3 09/22/2016    LABGLOM >90 12/14/2021    MG 2.0 01/29/2019    CALCIUM 8.8 12/14/2021    VITD25 24 (L) 01/29/2019     Assessment:       Diagnosis Orders   1. Cough  XR CHEST STANDARD (2 VW)   2. COVID-19  albuterol (PROVENTIL) (2.5 MG/3ML) 0.083% nebulizer solution   3. Irregular menstruation  levonorgestrel-ethinyl estradiol (NORDETTE) 0.15-30 MG-MCG per tablet   4. Menstrual migraine with status migrainosus, not intractable  levonorgestrel-ethinyl estradiol (NORDETTE) 0.15-30 MG-MCG per tablet   5. Migraine with aura and without status migrainosus, not intractable  topiramate (TOPAMAX) 50 MG tablet    SUMAtriptan (IMITREX) 100 MG tablet   6.  Non-seasonal allergic rhinitis, unspecified trigger  loratadine (CLARITIN) 10 MG tablet   7. Post-COVID chronic cough  azithromycin (ZITHROMAX) 250 MG tablet       Plan: We will send in an antibiotic, please hold off on taking this unless her symptoms progressively get worse over the weekend    On a daily inhaler, if used twice daily, rinse her mouth after each use  We will refill your other medications  · Avoid headache triggers:  · Lack of food - Aim for 3 health meals daily, with healthy snacks in between  · Lack of water - Aim for half of your body weight in ounces daily  · Lack of sleep - Aim for 6-8 hours nightly  · Certain foods that trigger migraines: Aged cheese, wine, onions, carbohydrates, and chocolate  · Please start a headache diary:  · Write down when the headache starts, home treatment, and when the headache ends  · Write down what he has had to eat and drink that day and the day before  · Write down how many hours of sleep he had the night before  · Write down how many ounces of water he had that day and the day before. We will get a chest x-ray  Return if symptoms worsen or fail to improve. Orders Placed:  Orders Placed This Encounter   Procedures    XR CHEST STANDARD (2 VW)     Medications Prescribed:  Orders Placed This Encounter   Medications    fluticasone-salmeterol (ADVAIR) 100-50 MCG/DOSE diskus inhaler     Sig: Inhale 1 puff into the lungs every 12 hours     Dispense:  60 each     Refill:  3    Nebulizers (PORTABLE COMPRESSOR NEBULIZER) MISC     Sig: Use daily as needed     Dispense:  1 each     Refill:  0     [employer] Innospire Go Portable Mesh Nebulizer Kit    azithromycin (ZITHROMAX) 250 MG tablet     Sig: Take 2 tabs (500 mg) on Day 1, and take 1 tab (250 mg) on days 2 through 5.      Dispense:  1 packet     Refill:  0    albuterol (PROVENTIL) (2.5 MG/3ML) 0.083% nebulizer solution     Sig: Take 3 mLs by nebulization every 6 hours as needed for Wheezing     Dispense:  120 each     Refill:  3  levonorgestrel-ethinyl estradiol (NORDETTE) 0.15-30 MG-MCG per tablet     Sig: One daily     Dispense:  180 tablet     Refill:  1    topiramate (TOPAMAX) 50 MG tablet     Sig: take 1 tablet by mouth at bedtime. Dispense:  60 tablet     Refill:  1    SUMAtriptan (IMITREX) 100 MG tablet     Sig: take 1 tablet by mouth if needed AT ONSET OF HEADACHE     Dispense:  9 tablet     Refill:  1    loratadine (CLARITIN) 10 MG tablet     Sig: Take 1 tablet by mouth daily Take 2 tables     Dispense:  60 tablet     Refill:  1     Future Appointments   Date Time Provider Jennifer Palacio   6/21/2022  9:20 AM EARL Padilla CNP [facility] [address] [facility]      Patient given educational materials - see patient instructions. Discussed use, benefit, and side effects of prescribedmedications. All patient questions answered. Pt voiced understanding. Reviewed health maintenance. Instructed to continue current medications, diet and exercise. Patient agreed with treatment plan. Follow up as directed.     Electronically signed by EARL Padilla CNP on 1/21/2022 at 10:27 AM

## 2022-01-24 ENCOUNTER — TELEPHONE (OUTPATIENT)
Dept: OBGYN CLINIC | Age: 32
End: 2022-01-24

## 2022-01-24 NOTE — TELEPHONE ENCOUNTER
I called the pt for a result note pt had questions   She states when she was here 1/21/22 you had discussed changing her med to nasal new instead of flonase  Can you get this changed? Also she is req her ocp to be written that she takes ocp continuously instead of daily  She takes 21 pills then goes into the next pack every 3 months for periiod control  Can you fix this for her?   She states she uses [facility] on [address]  Thank you Jessica

## 2022-01-25 ENCOUNTER — TELEPHONE (OUTPATIENT)
Dept: FAMILY MEDICINE CLINIC | Age: 32
End: 2022-01-25

## 2022-01-26 DIAGNOSIS — N92.6 IRREGULAR MENSTRUATION: ICD-10-CM

## 2022-01-26 DIAGNOSIS — G43.821 MENSTRUAL MIGRAINE WITH STATUS MIGRAINOSUS, NOT INTRACTABLE: ICD-10-CM

## 2022-01-26 RX ORDER — TRIAMCINOLONE ACETONIDE 55 UG/1
2 SPRAY, METERED NASAL DAILY
Qty: 1 EACH | Refills: 3 | Status: SHIPPED | OUTPATIENT
Start: 2022-01-26 | End: 2022-05-17

## 2022-01-26 RX ORDER — LEVONORGESTREL AND ETHINYL ESTRADIOL 0.15-0.03
KIT ORAL
Qty: 3 PACKET | Refills: 1 | Status: SHIPPED | OUTPATIENT
Start: 2022-01-26 | End: 2022-05-11 | Stop reason: SDUPTHER

## 2022-03-14 DIAGNOSIS — G43.109 MIGRAINE WITH AURA AND WITHOUT STATUS MIGRAINOSUS, NOT INTRACTABLE: ICD-10-CM

## 2022-03-14 RX ORDER — SUMATRIPTAN 100 MG/1
TABLET, FILM COATED ORAL
Qty: 9 TABLET | Refills: 1 | Status: SHIPPED | OUTPATIENT
Start: 2022-03-14 | End: 2022-06-07 | Stop reason: SDUPTHER

## 2022-03-14 NOTE — TELEPHONE ENCOUNTER
Patient's last appointment was : 1/21/2022  Patient's next appointment is : 6/21/2022  Last refilled: 2/15/22

## 2022-04-04 DIAGNOSIS — F33.1 MODERATE EPISODE OF RECURRENT MAJOR DEPRESSIVE DISORDER (HCC): ICD-10-CM

## 2022-04-04 DIAGNOSIS — F41.9 ANXIETY: ICD-10-CM

## 2022-04-04 RX ORDER — FLUOXETINE HYDROCHLORIDE 20 MG/1
CAPSULE ORAL
Qty: 60 CAPSULE | Refills: 3 | Status: SHIPPED | OUTPATIENT
Start: 2022-04-04 | End: 2022-04-06 | Stop reason: ALTCHOICE

## 2022-04-04 NOTE — TELEPHONE ENCOUNTER
Patient's last appointment was : 1/21/2022  Patient's next appointment is : 6/21/2022  Last refilled: 12/14/21

## 2022-04-06 ENCOUNTER — OFFICE VISIT (OUTPATIENT)
Dept: FAMILY MEDICINE CLINIC | Age: 32
End: 2022-04-06
Payer: MEDICARE

## 2022-04-06 ENCOUNTER — NURSE ONLY (OUTPATIENT)
Dept: LAB | Age: 32
End: 2022-04-06

## 2022-04-06 VITALS
RESPIRATION RATE: 16 BRPM | BODY MASS INDEX: 45.99 KG/M2 | TEMPERATURE: 97.1 F | HEIGHT: 67 IN | HEART RATE: 87 BPM | WEIGHT: 293 LBS | OXYGEN SATURATION: 98 % | SYSTOLIC BLOOD PRESSURE: 124 MMHG | DIASTOLIC BLOOD PRESSURE: 84 MMHG

## 2022-04-06 DIAGNOSIS — F41.9 ANXIETY: ICD-10-CM

## 2022-04-06 DIAGNOSIS — R23.2 FACIAL FLUSHING: ICD-10-CM

## 2022-04-06 DIAGNOSIS — R60.0 LEG EDEMA, LEFT: ICD-10-CM

## 2022-04-06 DIAGNOSIS — R35.89 POLYURIA: Primary | ICD-10-CM

## 2022-04-06 DIAGNOSIS — R35.89 POLYURIA: ICD-10-CM

## 2022-04-06 LAB
ALBUMIN SERPL-MCNC: 4.2 G/DL (ref 3.5–5.1)
ALP BLD-CCNC: 85 U/L (ref 38–126)
ALT SERPL-CCNC: 14 U/L (ref 11–66)
ANION GAP SERPL CALCULATED.3IONS-SCNC: 12 MEQ/L (ref 8–16)
AST SERPL-CCNC: 10 U/L (ref 5–40)
AVERAGE GLUCOSE: 102 MG/DL (ref 70–126)
BILIRUB SERPL-MCNC: 0.2 MG/DL (ref 0.3–1.2)
BILIRUBIN URINE: NEGATIVE
BLOOD URINE, POC: NEGATIVE
BUN BLDV-MCNC: 10 MG/DL (ref 7–22)
CALCIUM SERPL-MCNC: 9.2 MG/DL (ref 8.5–10.5)
CHARACTER, URINE: CLEAR
CHLORIDE BLD-SCNC: 104 MEQ/L (ref 98–111)
CO2: 19 MEQ/L (ref 23–33)
COLOR, URINE: YELLOW
CREAT SERPL-MCNC: 0.5 MG/DL (ref 0.4–1.2)
GFR SERPL CREATININE-BSD FRML MDRD: > 90 ML/MIN/1.73M2
GLUCOSE BLD-MCNC: 111 MG/DL (ref 70–108)
GLUCOSE URINE: NEGATIVE MG/DL
HBA1C MFR BLD: 5.4 % (ref 4.4–6.4)
KETONES, URINE: NEGATIVE
LEUKOCYTE CLUMPS, URINE: NEGATIVE
NITRITE, URINE: NEGATIVE
PH, URINE: 6 (ref 5–9)
POTASSIUM SERPL-SCNC: 4.1 MEQ/L (ref 3.5–5.2)
PROTEIN, URINE: NEGATIVE MG/DL
RHEUMATOID FACTOR: < 10 IU/ML (ref 0–13)
SEDIMENTATION RATE, ERYTHROCYTE: 7 MM/HR (ref 0–20)
SODIUM BLD-SCNC: 135 MEQ/L (ref 135–145)
SPECIFIC GRAVITY, URINE: 1.02 (ref 1–1.03)
TOTAL PROTEIN: 7.3 G/DL (ref 6.1–8)
TSH SERPL DL<=0.05 MIU/L-ACNC: 1.63 UIU/ML (ref 0.4–4.2)
UROBILINOGEN, URINE: 0.2 EU/DL (ref 0–1)

## 2022-04-06 PROCEDURE — G8417 CALC BMI ABV UP PARAM F/U: HCPCS | Performed by: NURSE PRACTITIONER

## 2022-04-06 PROCEDURE — 99214 OFFICE O/P EST MOD 30 MIN: CPT | Performed by: NURSE PRACTITIONER

## 2022-04-06 PROCEDURE — G8427 DOCREV CUR MEDS BY ELIG CLIN: HCPCS | Performed by: NURSE PRACTITIONER

## 2022-04-06 PROCEDURE — 1036F TOBACCO NON-USER: CPT | Performed by: NURSE PRACTITIONER

## 2022-04-06 RX ORDER — BUPROPION HYDROCHLORIDE 150 MG/1
150 TABLET ORAL EVERY MORNING
Qty: 30 TABLET | Refills: 3 | Status: SHIPPED | OUTPATIENT
Start: 2022-04-06 | End: 2022-11-01

## 2022-04-06 RX ORDER — PYRIDOXINE HCL (VITAMIN B6) 100 MG
500 TABLET ORAL 2 TIMES DAILY
Qty: 60 CAPSULE | Refills: 5 | Status: SHIPPED | OUTPATIENT
Start: 2022-04-06

## 2022-04-06 ASSESSMENT — ENCOUNTER SYMPTOMS
DIARRHEA: 0
NAUSEA: 0
BLOOD IN STOOL: 0
EYE DISCHARGE: 0
COUGH: 0
EYE REDNESS: 0
ABDOMINAL DISTENTION: 0
RHINORRHEA: 0
ANAL BLEEDING: 0
COLOR CHANGE: 0
SHORTNESS OF BREATH: 0
ABDOMINAL PAIN: 0
CONSTIPATION: 0
SORE THROAT: 0

## 2022-04-06 NOTE — PROGRESS NOTES
Health Maintenance Due   Topic Date Due    Hepatitis C screen  Never done    Varicella vaccine (1 of 2 - 2-dose childhood series) Never done    COVID-19 Vaccine (1) Never done    DTaP/Tdap/Td vaccine (1 - Tdap) Never done

## 2022-04-06 NOTE — PATIENT INSTRUCTIONS
Patient Education      Stop Prozac  Start Wellbutrin    For the next week take Prozac every other day, on the days you're not taking Prozac you will take Wellbutrin    The next week take only Wellbutrin    Stop Buspar for now    Will get a bladder ultrasound    Blood work    Back in 5 weeks    Anxiety Disorder: Care Instructions  Your Care Instructions     Anxiety is a normal reaction to stress. Difficult situations can cause you to have symptoms such as sweaty palms and a nervous feeling. In an anxiety disorder, the symptoms are far more severe. Constant worry, muscle tension, trouble sleeping, nausea and diarrhea, and other symptoms can make normal daily activities difficult or impossible. These symptoms may occur for no reason, and they can affect your work, school, or social life. Medicines, counseling, and self-care can all help. Follow-up care is a key part of your treatment and safety. Be sure to make and go to all appointments, and call your doctor if you are having problems. It's also a good idea to know your test results and keep a list of the medicines you take. How can you care for yourself at home? · Take medicines exactly as directed. Call your doctor if you think you are having a problem with your medicine. · Go to your counseling sessions and follow-up appointments. · Recognize and accept your anxiety. Then, when you are in a situation that makes you anxious, say to yourself, \"This is not an emergency. I feel uncomfortable, but I am not in danger. I can keep going even if I feel anxious. \"  · Be kind to your body:  ? Relieve tension with exercise or a massage. ? Get enough rest.  ? Avoid alcohol, caffeine, nicotine, and illegal drugs. They can increase your anxiety level and cause sleep problems. ? Learn and do relaxation techniques. See below for more about these techniques. · Engage your mind. Get out and do something you enjoy. Go to a funny movie, or take a walk or hike. Plan your day. Having too much or too little to do can make you anxious. · Keep a record of your symptoms. Discuss your fears with a good friend or family member, or join a support group for people with similar problems. Talking to others sometimes relieves stress. · Get involved in social groups, or volunteer to help others. Being alone sometimes makes things seem worse than they are. · Get at least 30 minutes of exercise on most days of the week to relieve stress. Walking is a good choice. You also may want to do other activities, such as running, swimming, cycling, or playing tennis or team sports. Relaxation techniques  Do relaxation exercises 10 to 20 minutes a day. You can play soothing, relaxing music while you do them, if you wish. · Tell others in your house that you are going to do your relaxation exercises. Ask them not to disturb you. · Find a comfortable place, away from all distractions and noise. · Lie down on your back, or sit with your back straight. · Focus on your breathing. Make it slow and steady. · Breathe in through your nose. Breathe out through either your nose or mouth. · Breathe deeply, filling up the area between your navel and your rib cage. Breathe so that your belly goes up and down. · Do not hold your breath. · Breathe like this for 5 to 10 minutes. Notice the feeling of calmness throughout your whole body. As you continue to breathe slowly and deeply, relax by doing the following for another 5 to 10 minutes:  · Tighten and relax each muscle group in your body. You can begin at your toes and work your way up to your head. · Imagine your muscle groups relaxing and becoming heavy. · Empty your mind of all thoughts. · Let yourself relax more and more deeply. · Become aware of the state of calmness that surrounds you.   · When your relaxation time is over, you can bring yourself back to alertness by moving your fingers and toes and then your hands and feet and then stretching and moving your entire body. Sometimes people fall asleep during relaxation, but they usually wake up shortly afterward. · Always give yourself time to return to full alertness before you drive a car or do anything that might cause an accident if you are not fully alert. Never play a relaxation tape while you drive a car. When should you call for help? Call 911 anytime you think you may need emergency care. For example, call if:    · You feel you cannot stop from hurting yourself or someone else. Keep the numbers for these national suicide hotlines: 6-438-445-TALK (0-677.661.3119) and 3-462-SIVPBCQ (6-839.276.8932). If you or someone you know talks about suicide or feeling hopeless, get help right away. Watch closely for changes in your health, and be sure to contact your doctor if:    · You have anxiety or fear that affects your life.     · You have symptoms of anxiety that are new or different from those you had before. Where can you learn more? Go to https://Rakuten.Celltex Therapeutics. org and sign in to your Yogurt3D Engine account. Enter P754 in the [employer] box to learn more about \"Anxiety Disorder: Care Instructions. \"     If you do not have an account, please click on the \"Sign Up Now\" link. Current as of: September 23, 2020               Content Version: 12.9  © 2006-2021 Healthwise, [facility]. Care instructions adapted under license by [facility] ([facility]). If you have questions about a medical condition or this instruction, always ask your healthcare professional. [place] [address] any warranty or liability for your use of this information. Patient Education        Learning About Anxiety Disorders  What are anxiety disorders? Anxiety disorders are a type of medical problem. They cause severe anxiety. When you feel anxious, you feel that something bad is about to happen. Thisfeeling interferes with your life. These disorders include:   Generalized anxiety disorder.  You feel worried and stressed about many everyday events and activities. This goes on for several months and disrupts your life on most days.  Panic disorder. You have repeated panic attacks. A panic attack is a sudden, intense fear or anxiety. It may make you feel short of breath. Your heart may pound.  Social anxiety disorder. You feel very anxious about what you will say or do in front of people. For example, you may be scared to talk or eat in public. This problem affects your daily life.  Phobias. You are very scared of a specific object, situation, or activity. For example, you may fear spiders, high places, or small spaces. What are the symptoms? Generalized anxiety disorder  Symptoms may include:   Feeling worried and stressed about many things almost every day.  Feeling tired or irritable. You may have a hard time concentrating.  Having headaches or muscle aches.  Having a hard time getting to sleep or staying asleep. Panic disorder  You may have repeated panic attacks when there is no reason for feeling afraid. You may change your daily activities because you worry that you will haveanother attack. Symptoms may include:   Intense fear, terror, or anxiety.  Trouble breathing or very fast breathing.  Chest pain or tightness.  A heartbeat that races or is not regular. Social anxiety disorder  Symptoms may include:   Fear about a social situation, such as eating in front of others or speaking in public. You may worry a lot. Or you may be afraid that something bad will happen.  Anxiety that can cause you to blush, sweat, and feel shaky.  A heartbeat that is faster than normal.   A hard time focusing. Phobias  Symptoms may include:   More fear than most people of being around an object, being in a situation, or doing an activity. You might also be stressed about the chance of being around the thing you fear.    Worry about losing control, panicking, fainting, or having physical symptoms like a faster heartbeat when you are around the situation or object. How are these disorders treated? Anxiety disorders can be treated with medicines or counseling. A combination ofboth may be used. Medicines may include:   Antidepressants. These may help your symptoms by keeping chemicals in your brain in balance.  Benzodiazepines. These may give you short-term relief of your symptoms. Some people use cognitive-behavioral therapy. A therapist helps you learn tochange stressful or bad thoughts into helpful thoughts. Lead a healthy lifestyle  A healthy lifestyle may help you feel better.  Get at least 30 minutes of exercise on most days of the week. Walking is a good choice.  Eat a healthy diet. Include fruits, vegetables, lean proteins, and whole grains in your diet each day.  Try to go to bed at the same time every night. Try for 8 hours of sleep a night.  Find ways to manage stress. Try relaxation exercises.  Avoid alcohol and illegal drugs. Follow-up care is a key part of your treatment and safety. Be sure to make and go to all appointments, and call your doctor if you are having problems. It's also a good idea to know your test results and keep alist of the medicines you take. Where can you learn more? Go to https://Hopela.Tactile Systems Technology. org and sign in to your HEALBE account. Enter N672 in the [employer] box to learn more about \"Learning About Anxiety Disorders. \"     If you do not have an account, please click on the \"Sign Up Now\" link. Current as of: June 16, 2021               Content Version: 13.2  © 2006-2022 Healthwise, [employer]. Care instructions adapted under license by [facility] ([facility]). If you have questions about a medical condition or this instruction, always ask your healthcare professional. [place] [address] any warranty or liability for your use of this information.          Patient Education        Leg and Ankle Edema: Care Instructions  Your Care Instructions  Swelling in the legs, ankles, and feet is called edema. It is common after you sit or stand for a while. Long plane flights or car rides often cause swelling in the legs and feet. You may also have swelling if you have to stand for long periods of time at your job. Problems with the veins in the legs (varicose veins) and changes in hormones can also cause swelling. Sometimes the swelling in the ankles and feet is caused by a more serious problem, such as heartfailure, infection, blood clots, or liver or kidney disease. Follow-up care is a key part of your treatment and safety. Be sure to make and go to all appointments, and call your doctor if you are having problems. It's also a good idea to know your test results and keep alist of the medicines you take. How can you care for yourself at home?  If your doctor gave you medicine, take it as prescribed. Call your doctor if you think you are having a problem with your medicine.  Whenever you are resting, raise your legs up. Try to keep the swollen area higher than the level of your heart.  Take breaks from standing or sitting in one position. ? Walk around to increase the blood flow in your lower legs. ? Move your feet and ankles often while you stand, or tighten and relax your leg muscles.  Wear support stockings. Put them on in the morning, before swelling gets worse.  Eat a balanced diet. Lose weight if you need to.  Limit the amount of salt (sodium) in your diet. Salt holds fluid in the body and may increase swelling. When should you call for help? Call 911 anytime you think you may need emergency care. For example, call if:     You have symptoms of a blood clot in your lung (called a pulmonary embolism). These may include:  ? Sudden chest pain. ? Trouble breathing. ? Coughing up blood.    Call your doctor now or seek immediate medical care if:     You have signs of a blood clot, such as:  ? Pain in your calf, back of the knee, thigh, or groin. ? Redness and swelling in your leg or groin.      You have symptoms of infection, such as:  ? Increased pain, swelling, warmth, or redness. ? Red streaks or pus. ? A fever. Watch closely for changes in your health, and be sure to contact your doctor if:     Your swelling is getting worse.      You have new or worsening pain in your legs.      You do not get better as expected. Where can you learn more? Go to https://Genbook.DataGravity. org and sign in to your Orbiter account. Enter B228 in the [facility] box to learn more about \"Leg and Ankle Edema: Care Instructions. \"     If you do not have an account, please click on the \"Sign Up Now\" link. Current as of: July 1, 2021               Content Version: 13.2  © 2006-2022 Who[employer]. Care instructions adapted under license by [facility] ([facility]). If you have questions about a medical condition or this instruction, always ask your healthcare professional. [place] [address] any warranty or liability for your use of this information. Patient Education        Frequent Urination: Care Instructions  Your Care Instructions  An urge to urinate frequently but usually passing only small amounts of urine is a common symptom of urinary problems, such as urinary tract infections. The bladder may become inflamed. This can cause the urge to urinate. You may try tourinate more often than usual to try to soothe that urge. Frequent urination also may be caused by sexually transmitted infections (STIs) or kidney stones. Or it may happen when something irritates the tube that carries urine from the bladder to the outside of the body (urethra). It mayalso be a sign of diabetes. The cause may be hard to find. You may need tests. Follow-up care is a key part of your treatment and safety. Be sure to make and go to all appointments, and call your doctor if you are having problems. It's also a good idea to know your test results and keep alist of the medicines you take. How can you care for yourself at home?  Drink extra water for the next day or two. This will help make the urine less concentrated. (If you have kidney, heart, or liver disease and have to limit fluids, talk with your doctor before you increase the amount of fluids you drink.)   Avoid drinks that are carbonated or have caffeine. They can irritate the bladder. For women:   Urinate right after you have sex.  After you go to the bathroom, wipe from front to back.  Avoid douches, bubble baths, and feminine hygiene sprays. And avoid other feminine hygiene products that have deodorants. When should you call for help? Call your doctor now or seek immediate medical care if:     You have new symptoms, such as fever, nausea, or vomiting.      You have new or worse symptoms of a urinary problem. For example:  ? You have blood or pus in your urine. ? You have chills or body aches. ? It hurts to urinate. ? You have groin or belly pain. ? You have pain in your back just below your rib cage (the flank area). Watch closely for changes in your health, and be sure to contact your doctor ifyou feel thirstier than usual.  Where can you learn more? Go to https://TouchBase TechnologiesfaustoExtra Life.Snaptrip. org and sign in to your Vyu account. Enter 057 4929 in the [employer] box to learn more about \"Frequent Urination: Care Instructions. \"     If you do not have an account, please click on the \"Sign Up Now\" link. Current as of: October 18, 2021               Content Version: 13.2  © 4782-9381 Healthwise, [employer]. Care instructions adapted under license by [facility] ([facility]). If you have questions about a medical condition or this instruction, always ask your healthcare professional. [place] [address] any warranty or liability for your use of this information.

## 2022-04-06 NOTE — PROGRESS NOTES
[address] [facility]  [phone] (phone)  [phone] (fax)    Visit Date: 4/6/2022    Isiah Rizvi is a 27 y.o. female who presents today for:  Chief Complaint   Patient presents with    Urinary Frequency     urgency to go but nothing releases when trying to go    Discuss Medications     HPI:     Urinary frequency - started 2/7/2022    Her period was really off - noticed if she missed 1 pill her cycle was off. Insurance would not let her get them in time. Did get the again - then hd a period and waited until the cycle ended and took it again a week after the bleeding stopped. Had intercourse and next morning she started having the bladder issue - urgency. Will urinate and feels like her bladder is full again right away. At times she will sit there and try to urinate but  Nothing much comes out. Feels like her anxiety medication is not working - pretty anxious - has been on prozac for about a year    Face will get warm and red - notices when she gets out of the shower - stays red for a while and the next day her cheeks are pink/red.     Swelling in left lowe leg - minimal - since december  HPI  Health Maintenance   Topic Date Due    Hepatitis C screen  Never done    Varicella vaccine (1 of 2 - 2-dose childhood series) Never done    COVID-19 Vaccine (1) Never done    DTaP/Tdap/Td vaccine (1 - Tdap) Never done    Cervical cancer screen  05/21/2022    Depression Monitoring  07/27/2022    Flu vaccine (Season Ended) 09/01/2022    HIV screen  Completed    Hepatitis A vaccine  Aged Out    Hepatitis B vaccine  Aged Out    Hib vaccine  Aged Out    Meningococcal (ACWY) vaccine  Aged Out    Pneumococcal 0-64 years Vaccine  Aged Out     Past Medical History:   Diagnosis Date    Anxiety 06/2016    COVID-19     Postpartum depression 2010      Past Surgical History:   Procedure Laterality Date    WISDOM TOOTH EXTRACTION  2006     Family History   Problem Relation Age of Onset    Miscarriages / Stillbirths Mother     High Blood Pressure Father     High Blood Pressure Brother     Diabetes Maternal Grandmother      Social History     Tobacco Use    Smoking status: Former Smoker     Quit date: 5/25/2006     Years since quitting: 15.8    Smokeless tobacco: Never Used   Substance Use Topics    Alcohol use: No      Current Outpatient Medications   Medication Sig Dispense Refill    Cranberry-Vit C-Lactobacillus (RA CRANBERRY SUPPLEMENTS PO) Take by mouth      D-Mannose 500 MG CAPS Take by mouth      buPROPion (WELLBUTRIN XL) 150 MG extended release tablet Take 1 tablet by mouth every morning 30 tablet 3    Cranberry 500 MG CAPS Take 1 capsule by mouth 2 times daily 60 capsule 5    SUMAtriptan (IMITREX) 100 MG tablet take 1 tablet by mouth AT ONSET OF HEADACHE may repeat in 2 hours IF headache PERSISTS maximum daily dose of 2 tablets ( 200 milligrams ) every 24 hours 9 tablet 1    levonorgestrel-ethinyl estradiol (NORDETTE) 0.15-30 MG-MCG per tablet One daily 3 packet 1    triamcinolone (NASACORT) 55 MCG/ACT nasal inhaler 2 sprays by Each Nostril route daily 1 each 3    fluticasone-salmeterol (ADVAIR) 100-50 MCG/DOSE diskus inhaler Inhale 1 puff into the lungs every 12 hours 60 each 3    Nebulizers (PORTABLE COMPRESSOR NEBULIZER) MISC Use daily as needed 1 each 0    albuterol (PROVENTIL) (2.5 MG/3ML) 0.083% nebulizer solution Take 3 mLs by nebulization every 6 hours as needed for Wheezing 120 each 3    topiramate (TOPAMAX) 50 MG tablet take 1 tablet by mouth at bedtime.  60 tablet 1    loratadine (CLARITIN) 10 MG tablet Take 1 tablet by mouth daily Take 2 tables 60 tablet 1    Zinc Sulfate (ZINC-220 PO) Take by mouth      Respiratory Therapy Supplies (NEBULIZER/TUBING/MOUTHPIECE) KIT 1 kit by Does not apply route daily as needed (wheezing) 1 kit 0    fluticasone (FLONASE) 50 MCG/ACT nasal spray instill 1 spray into each nostril once daily 1 each 3    montelukast (SINGULAIR) 10 MG tablet take 1 tablet by mouth once daily 180 tablet 1    Omega-3 Fatty Acids (RA FISH OIL) 1000 MG CAPS Take 1 capsule 3 times daily 90 capsule 5    vitamin D (RA VITAMIN D-3) 125 MCG (5000 UT) CAPS capsule take 1 capsule by mouth once daily 30 capsule 5    Vitamins-Lipotropics (BALANCED B-100 COMPLEX CR) TBCR Take 1 tablet by mouth 4 times daily (after meals and at bedtime) 120 tablet 5    albuterol sulfate HFA (VENTOLIN HFA) 108 (90 Base) MCG/ACT inhaler Inhale 2 puffs into the lungs 4 times daily as needed for Wheezing 18 g 0    Cinnamon 500 MG CAPS Take four times daily before meals and at bedtime 120 capsule 5    Saline 0.2 % SOLN by Nasal route      guaiFENesin (MUCINEX) 600 MG extended release tablet Take 1 tablet by mouth 2 times daily 20 tablet 1    Elastic Bandages & Supports (B & B ELASTIC ANKLE BRACE) MISC Wear brace when up walking during the day 1 each 0    azithromycin (ZITHROMAX) 250 MG tablet Take 2 tabs (500 mg) on Day 1, and take 1 tab (250 mg) on days 2 through 5. (Patient not taking: Reported on 4/6/2022) 1 packet 0    Magic Mouthwash (MIRACLE MOUTHWASH) Swish and spit 5 mLs 4 times daily as needed for Irritation (Patient not taking: Reported on 4/6/2022) 1 each 0     No current facility-administered medications for this visit. Allergies   Allergen Reactions    Latex Rash     And itching    Box Elder     Adhesive Tape Rash       Subjective:    Review of Systems   Constitutional: Negative for chills, fatigue and fever. HENT: Negative for congestion, ear pain, postnasal drip, rhinorrhea and sore throat. Eyes: Negative for discharge and redness. Respiratory: Negative for cough and shortness of breath. Cardiovascular: Negative for chest pain and leg swelling. Gastrointestinal: Negative for abdominal distention, abdominal pain, anal bleeding, blood in stool, constipation, diarrhea and nausea. Genitourinary: Positive for frequency and urgency. Negative for dysuria. Skin: Positive for rash (facial flushing). Negative for color change. Neurological: Negative for facial asymmetry, speech difficulty and weakness. Hematological: Does not bruise/bleed easily. Psychiatric/Behavioral: Negative for agitation and confusion. The patient is nervous/anxious. Objective:     Vitals:    04/06/22 0819   BP: 124/84   Site: Right Upper Arm   Position: Sitting   Cuff Size: Large Adult   Pulse: 87   Resp: 16   Temp: 97.1 °F (36.2 °C)   TempSrc: Skin   SpO2: 98%   Weight: (!) 314 lb 6.4 oz (142.6 kg)   Height: 5' 7\" (1.702 m)       Body mass index is 49.24 kg/m². Wt Readings from Last 3 Encounters:   04/06/22 (!) 314 lb 6.4 oz (142.6 kg)   01/21/22 (!) 315 lb 3.2 oz (143 kg)   12/21/21 (!) 305 lb 12.8 oz (138.7 kg)     BP Readings from Last 3 Encounters:   04/06/22 124/84   01/21/22 128/80   12/21/21 112/70     Physical Exam  Constitutional:       General: She is not in acute distress. Appearance: She is well-developed. She is not ill-appearing or diaphoretic. HENT:      Head: Normocephalic and atraumatic. Right Ear: Hearing and external ear normal. No decreased hearing noted. Left Ear: Hearing and external ear normal. No decreased hearing noted. Nose: Nose normal. No nasal deformity. Eyes:      General:         Right eye: No discharge. Left eye: No discharge. Conjunctiva/sclera: Conjunctivae normal.   Pulmonary:      Effort: Pulmonary effort is normal. No respiratory distress. Abdominal:      General: There is no distension. Tenderness: There is no guarding. Musculoskeletal:         General: No tenderness or deformity. Normal range of motion. Cervical back: Normal range of motion and neck supple. Comments: Measured calves  - no difference in size   Skin:     Coloration: Skin is not pale. Findings: No erythema or rash (On exposed areas). Neurological:      Mental Status: She is alert.       Gait: Gait normal. Psychiatric:         Speech: Speech normal.         Behavior: Behavior normal.         Thought Content: Thought content normal.         Judgment: Judgment normal.         Lab Results   Component Value Date    WBC 4.5 (L) 12/14/2021    HGB 14.0 12/14/2021    HCT 41.9 12/14/2021     12/14/2021    CHOL 181 01/29/2019    TRIG 161 01/29/2019    HDL 45 01/29/2019    LDLCALC 104 01/29/2019    AST 58 (H) 11/30/2021     12/14/2021    K 3.9 12/14/2021    CL 96 (L) 12/14/2021    CREATININE 0.6 12/14/2021    BUN 5 (L) 12/14/2021    CO2 26 12/14/2021    TSH 2.890 01/29/2019    LABA1C 5.3 09/22/2016    LABGLOM >90 12/14/2021    MG 2.0 01/29/2019    CALCIUM 8.8 12/14/2021    VITD25 24 (L) 01/29/2019     Assessment:       Diagnosis Orders   1. Polyuria  POCT glycosylated hemoglobin (Hb A1C)    Cranberry 500 MG CAPS    PA MEASUREMENT,POST-VOID RESIDUAL VOLUME BY US,NON-IMAGING    Hemoglobin A1C   2. Anxiety  buPROPion (WELLBUTRIN XL) 150 MG extended release tablet    TSH   3. Facial flushing  Allergen Inhalant [place] Comp 1    Common Food Allergen Profile    MYRON Screen with Reflex    RA Latex Screen    Rheumatoid Factor    Sedimentation Rate    Gliadin Deamidated Peptide Antibody, IgG    TSH   4. Leg edema, left  Comprehensive Metabolic Panel    Compression Stocking       Plan:   Stop Prozac  Start Wellbutrin    For the next week take Prozac every other day, on the days you're not taking Prozac you will take Wellbutrin    The next week take only Wellbutrin    Stop Buspar for now    Will get a bladder ultrasound    Blood work    Back in 5 weeks      Possibly starting ditropan in the future      Return in about 5 weeks (around 5/11/2022).     Orders Placed:  Orders Placed This Encounter   Procedures    Allergen Inhalant [place] Comp 1    Common Food Allergen Profile    MYRON Screen with Reflex    RA Latex Screen    Rheumatoid Factor    Sedimentation Rate    Gliadin Deamidated Peptide Antibody, IgG    TSH    Comprehensive Metabolic Panel    Hemoglobin A1C    POCT Urinalysis No Micro (Auto)    POCT glycosylated hemoglobin (Hb A1C)    W[place] MEASUREMENT,POST-VOID RESIDUAL VOLUME BY US,NON-IMAGING    Compression Stocking     Medications Prescribed:  Orders Placed This Encounter   Medications    buPROPion (WELLBUTRIN XL) 150 MG extended release tablet     Sig: Take 1 tablet by mouth every morning     Dispense:  30 tablet     Refill:  3    Cranberry 500 MG CAPS     Sig: Take 1 capsule by mouth 2 times daily     Dispense:  60 capsule     Refill:  5     Future Appointments   Date Time Provider Jennifer Palacio   5/11/2022  9:00 AM [facility]   6/21/2022  9:20 AM EARL Jenkins CNP  [facility] [address]      Patient given educational materials - see patient instructions. Discussed use, benefit, and side effects of prescribedmedications. All patient questions answered. Pt voiced understanding. Reviewed health maintenance. Instructed to continue current medications, diet and exercise. Patient agreed with treatment plan. Follow up as directed.     Electronically signed by EARL Jenkins CNP on 4/6/2022 at 11:09 AM

## 2022-04-08 LAB
ALLERGEN BARLEY IGE: < 0.1 KU/L
ALLERGEN BEEF: < 0.1 KU/L
ALLERGEN BERMUDA GRASS IGE: < 0.1 KU/L
ALLERGEN BIRCH IGE: < 0.1 KU/L
ALLERGEN BOX ELDER: < 0.1 KU/L
ALLERGEN CABBAGE IGE: < 0.1 KU/L
ALLERGEN CARROT IGE: < 0.1 KU/L
ALLERGEN CAT DANDER IGE: < 0.1 KU/L
ALLERGEN CHICKEN IGE: < 0.1 KU/L
ALLERGEN CODFISH IGE: < 0.1 KU/L
ALLERGEN COMMON SHORT RAGWEED IGE: < 0.1 KU/L
ALLERGEN CORN IGE: < 0.1 KU/L
ALLERGEN COTTONWOOD: < 0.1 KU/L
ALLERGEN CRAB IGE: < 0.1 KU/L
ALLERGEN D. FARINAE (DUST MITE): < 0.1 KU/L
ALLERGEN DOG DANDER IGE: < 0.1 KU/L
ALLERGEN EGG WHITE IGE: < 0.1 KU/L
ALLERGEN ELM IGE: < 0.1 KU/L
ALLERGEN FUNGI/MOLD A. ALTERNATA IGE: < 0.1 KU/L
ALLERGEN FUNGI/MOLD A. FUMIGATUS IGE: < 0.1 KU/L
ALLERGEN FUNGI/MOLD M.RACEMOSUS IGE: < 0.1 KU/L
ALLERGEN FUNGI/MOLD P. NOTATUM IGE: < 0.1 KU/L
ALLERGEN GERMAN COCKROACH IGE: < 0.1 KU/L
ALLERGEN GRAPE IGE: < 0.1 KU/L
ALLERGEN INTERPRETATION/SCORE: NORMAL
ALLERGEN INTERPRETATION/SCORE: NORMAL
ALLERGEN LETTUCE IGE: < 0.1 KU/L
ALLERGEN MILK IGE: < 0.1 KU/L
ALLERGEN MITE DUST PTERONYSSINUS IGE: < 0.1 KU/L
ALLERGEN MOUNTAIN CEDAR: < 0.1 KU/L
ALLERGEN MOUSE EPITHELIA IGE: < 0.1 KU/L
ALLERGEN NAVY BEAN: < 0.1 KU/L
ALLERGEN OAT: < 0.1 KU/L
ALLERGEN ORANGE IGE: < 0.1 KU/L
ALLERGEN PECAN TREE IGE: < 0.1 KU/L
ALLERGEN PEPPER C. ANNUUM IGE: < 0.1 KU/L
ALLERGEN PORK: < 0.1 KU/L
ALLERGEN POTATO IGE: < 0.1 KU/L
ALLERGEN RICE IGE: < 0.1 KU/L
ALLERGEN RUSSIAN THISTLE IGE: < 0.1 KU/L
ALLERGEN RYE IGE: < 0.1 KU/L
ALLERGEN SHEEP SORREL (W18) IGE: < 0.1 KU/L
ALLERGEN SHRIMP IGE: < 0.1 KU/L
ALLERGEN SOYBEAN IGE: < 0.1 KU/L
ALLERGEN TIMOTHY GRASS: < 0.1 KU/L
ALLERGEN TOMATO IGE: < 0.1 KU/L
ALLERGEN TREE SYCAMORE: < 0.1 KU/L
ALLERGEN TUNA IGE: < 0.1 KU/L
ALLERGEN WALNUT TREE IGE: < 0.1 KU/L
ALLERGEN WEED, PIGWEED IGE: < 0.1 KU/L
ALLERGEN WHEAT IGE: < 0.1 KU/L
ALLERGEN WHITE ASH: < 0.1 KU/L
ALLERGEN WHITE MULBERRY TREE, IGE: < 0.1 KU/L
ALLERGEN, FUNGI/MOLD: < 0.1 KU/L
ALLERGEN, TREE, OAK: < 0.1 KU/L
ANA SCREEN: NORMAL
GLIADIN PEPTIDE IGG: < 0.4 U/ML
IMMUNOGLOBULIN E: 18 KU/L

## 2022-04-10 DIAGNOSIS — J30.89 NON-SEASONAL ALLERGIC RHINITIS, UNSPECIFIED TRIGGER: ICD-10-CM

## 2022-04-11 ENCOUNTER — TELEPHONE (OUTPATIENT)
Dept: FAMILY MEDICINE CLINIC | Age: 32
End: 2022-04-11

## 2022-04-11 NOTE — TELEPHONE ENCOUNTER
Written by EARL Kennedy CNP on 4/8/2022 12:11 PM EDT  Seen by patient Shirley lFores on 4/8/2022  3:52 PM

## 2022-04-11 NOTE — TELEPHONE ENCOUNTER
Patient's last appointment was : 4/6/2022  Patient's next appointment is : 5/11/2022  Last refilled:1/21/22

## 2022-04-11 NOTE — TELEPHONE ENCOUNTER
----- Message from EARL Whitaker - CNP sent at 4/8/2022 12:11 PM EDT -----  Allergy testing came back negative -not reactive

## 2022-04-12 RX ORDER — LORATADINE 10 MG/1
TABLET ORAL
Qty: 30 TABLET | Refills: 2 | Status: SHIPPED | OUTPATIENT
Start: 2022-04-12 | End: 2022-05-11 | Stop reason: SDUPTHER

## 2022-04-14 DIAGNOSIS — R35.89 POLYURIA: Primary | ICD-10-CM

## 2022-04-14 DIAGNOSIS — J30.89 NON-SEASONAL ALLERGIC RHINITIS, UNSPECIFIED TRIGGER: ICD-10-CM

## 2022-04-18 ENCOUNTER — HOSPITAL ENCOUNTER (OUTPATIENT)
Dept: ULTRASOUND IMAGING | Age: 32
Discharge: HOME | End: 2022-04-18
Payer: MEDICARE

## 2022-04-18 DIAGNOSIS — R35.89 POLYURIA: ICD-10-CM

## 2022-04-18 PROCEDURE — 76775 US EXAM ABDO BACK WALL LIM: CPT

## 2022-04-19 ENCOUNTER — TELEPHONE (OUTPATIENT)
Dept: FAMILY MEDICINE CLINIC | Age: 32
End: 2022-04-19

## 2022-04-19 NOTE — TELEPHONE ENCOUNTER
----- Message from EARL Armstrong CNP sent at 4/19/2022 10:40 AM EDT -----  Postvoid amount was of no significance - about 8ml,

## 2022-05-11 ENCOUNTER — HOSPITAL ENCOUNTER (OUTPATIENT)
Dept: GENERAL RADIOLOGY | Age: 32
Discharge: HOME | End: 2022-05-11
Payer: MEDICARE

## 2022-05-11 ENCOUNTER — OFFICE VISIT (OUTPATIENT)
Dept: FAMILY MEDICINE CLINIC | Age: 32
End: 2022-05-11
Payer: MEDICARE

## 2022-05-11 ENCOUNTER — HOSPITAL ENCOUNTER (OUTPATIENT)
Age: 32
Discharge: HOME | End: 2022-05-11
Payer: MEDICARE

## 2022-05-11 VITALS
OXYGEN SATURATION: 99 % | BODY MASS INDEX: 45.99 KG/M2 | HEIGHT: 67 IN | TEMPERATURE: 97.9 F | RESPIRATION RATE: 20 BRPM | SYSTOLIC BLOOD PRESSURE: 128 MMHG | DIASTOLIC BLOOD PRESSURE: 86 MMHG | HEART RATE: 88 BPM | WEIGHT: 293 LBS

## 2022-05-11 DIAGNOSIS — Z11.59 ENCOUNTER FOR HEPATITIS C SCREENING TEST FOR LOW RISK PATIENT: ICD-10-CM

## 2022-05-11 DIAGNOSIS — Z13.220 SCREENING FOR HYPERLIPIDEMIA: ICD-10-CM

## 2022-05-11 DIAGNOSIS — G43.821 MENSTRUAL MIGRAINE WITH STATUS MIGRAINOSUS, NOT INTRACTABLE: ICD-10-CM

## 2022-05-11 DIAGNOSIS — M79.671 PAIN OF RIGHT HEEL: ICD-10-CM

## 2022-05-11 DIAGNOSIS — M79.671 RIGHT FOOT PAIN: ICD-10-CM

## 2022-05-11 DIAGNOSIS — J30.89 NON-SEASONAL ALLERGIC RHINITIS, UNSPECIFIED TRIGGER: ICD-10-CM

## 2022-05-11 DIAGNOSIS — F41.9 ANXIETY: ICD-10-CM

## 2022-05-11 DIAGNOSIS — N92.6 IRREGULAR MENSTRUATION: ICD-10-CM

## 2022-05-11 DIAGNOSIS — Z78.9 VARICELLA VACCINATION STATUS UNKNOWN: Primary | ICD-10-CM

## 2022-05-11 DIAGNOSIS — U07.1 COVID-19: ICD-10-CM

## 2022-05-11 PROCEDURE — 73650 X-RAY EXAM OF HEEL: CPT

## 2022-05-11 PROCEDURE — 73630 X-RAY EXAM OF FOOT: CPT

## 2022-05-11 PROCEDURE — 99214 OFFICE O/P EST MOD 30 MIN: CPT | Performed by: NURSE PRACTITIONER

## 2022-05-11 PROCEDURE — G8427 DOCREV CUR MEDS BY ELIG CLIN: HCPCS | Performed by: NURSE PRACTITIONER

## 2022-05-11 PROCEDURE — 1036F TOBACCO NON-USER: CPT | Performed by: NURSE PRACTITIONER

## 2022-05-11 PROCEDURE — G8417 CALC BMI ABV UP PARAM F/U: HCPCS | Performed by: NURSE PRACTITIONER

## 2022-05-11 RX ORDER — BUSPIRONE HYDROCHLORIDE 10 MG/1
10 TABLET ORAL 3 TIMES DAILY
Qty: 90 TABLET | Refills: 0 | Status: SHIPPED | OUTPATIENT
Start: 2022-05-11 | End: 2022-06-10

## 2022-05-11 RX ORDER — LORATADINE 10 MG/1
20 TABLET ORAL DAILY
Qty: 60 TABLET | Refills: 5 | Status: SHIPPED | OUTPATIENT
Start: 2022-05-11

## 2022-05-11 RX ORDER — ALBUTEROL SULFATE 2.5 MG/3ML
SOLUTION RESPIRATORY (INHALATION)
Qty: 360 ML | Refills: 1 | Status: SHIPPED | OUTPATIENT
Start: 2022-05-11

## 2022-05-11 RX ORDER — LEVONORGESTREL AND ETHINYL ESTRADIOL 0.15-0.03
KIT ORAL
Qty: 1 PACKET | Refills: 11 | Status: SHIPPED | OUTPATIENT
Start: 2022-05-11

## 2022-05-11 ASSESSMENT — ENCOUNTER SYMPTOMS
CONSTIPATION: 0
COLOR CHANGE: 0
SHORTNESS OF BREATH: 0
BLOOD IN STOOL: 0
NAUSEA: 0
ABDOMINAL PAIN: 0
SORE THROAT: 0
EYE DISCHARGE: 0
ABDOMINAL DISTENTION: 0
DIARRHEA: 0
COUGH: 0
RHINORRHEA: 0
ANAL BLEEDING: 0
EYE REDNESS: 0

## 2022-05-11 NOTE — PROGRESS NOTES
[address] [facility]  [phone] (phone)  [phone] (fax)    Visit Date: 5/11/2022    Dwaine Hare is a 27 y.o. female who presents today for:  Chief Complaint   Patient presents with    Follow-up     5 Week Follow-up Anxiety and would like to discuss starting back on Buspar also having Right foot pain      HPI:     Anxiety: wants to stay on Wellbutrin but need something for anxiety.  Wellbutrin helped with the depression    Having allergies issues - needs refills of Claritin    Right foot pain - hurts in heel - was seeing Dr. Olena Leblanc - had arch support -has had this issue for years    HPI  Health Maintenance   Topic Date Due    Varicella vaccine (1 of 2 - 2-dose childhood series) Never done    COVID-19 Vaccine (1) Never done    Hepatitis C screen  Never done    DTaP/Tdap/Td vaccine (1 - Tdap) Never done    Cervical cancer screen  05/21/2022    Depression Monitoring  07/27/2022    Flu vaccine (Season Ended) 09/01/2022    HIV screen  Completed    Hepatitis A vaccine  Aged Out    Hepatitis B vaccine  Aged Out    Hib vaccine  Aged Out    Meningococcal (ACWY) vaccine  Aged Out    Pneumococcal 0-64 years Vaccine  Aged Out     Past Medical History:   Diagnosis Date    Anxiety 06/2016    COVID-19     Postpartum depression 2010      Past Surgical History:   Procedure Laterality Date    WISDOM TOOTH EXTRACTION  2006     Family History   Problem Relation Age of Onset   [de-identified] / [place] Mother     High Blood Pressure Father     High Blood Pressure Brother     Diabetes Maternal Grandmother      Social History     Tobacco Use    Smoking status: Former Smoker     Quit date: 5/25/2006     Years since quitting: 15.9    Smokeless tobacco: Never Used   Substance Use Topics    Alcohol use: No      Current Outpatient Medications   Medication Sig Dispense Refill    sodium chloride (OCEAN, BABY AYR) 0.65 % nasal spray 1 spray by Nasal route as needed for Congestion      loratadine (CLARITIN) 10 MG tablet Take 2 tablets by mouth daily 60 tablet 5    busPIRone (BUSPAR) 10 MG tablet Take 1 tablet by mouth 3 times daily 90 tablet 0    levonorgestrel-ethinyl estradiol (NORDETTE) 0.15-30 MG-MCG per tablet Take tablet daily 1 packet 11    buPROPion (WELLBUTRIN XL) 150 MG extended release tablet Take 1 tablet by mouth every morning 30 tablet 3    Cranberry 500 MG CAPS Take 1 capsule by mouth 2 times daily 60 capsule 5    SUMAtriptan (IMITREX) 100 MG tablet take 1 tablet by mouth AT ONSET OF HEADACHE may repeat in 2 hours IF headache PERSISTS maximum daily dose of 2 tablets ( 200 milligrams ) every 24 hours 9 tablet 1    triamcinolone (NASACORT) 55 MCG/ACT nasal inhaler 2 sprays by Each Nostril route daily 1 each 3    fluticasone-salmeterol (ADVAIR) 100-50 MCG/DOSE diskus inhaler Inhale 1 puff into the lungs every 12 hours 60 each 3    Nebulizers (PORTABLE COMPRESSOR NEBULIZER) MISC Use daily as needed 1 each 0    albuterol (PROVENTIL) (2.5 MG/3ML) 0.083% nebulizer solution Take 3 mLs by nebulization every 6 hours as needed for Wheezing 120 each 3    topiramate (TOPAMAX) 50 MG tablet take 1 tablet by mouth at bedtime.  60 tablet 1    Zinc Sulfate (ZINC-220 PO) Take by mouth daily       Respiratory Therapy Supplies (NEBULIZER/TUBING/MOUTHPIECE) KIT 1 kit by Does not apply route daily as needed (wheezing) 1 kit 0    fluticasone (FLONASE) 50 MCG/ACT nasal spray instill 1 spray into each nostril once daily 1 each 3    montelukast (SINGULAIR) 10 MG tablet take 1 tablet by mouth once daily 180 tablet 1    Omega-3 Fatty Acids (RA FISH OIL) 1000 MG CAPS Take 1 capsule 3 times daily 90 capsule 5    vitamin D (RA VITAMIN D-3) 125 MCG (5000 UT) CAPS capsule take 1 capsule by mouth once daily 30 capsule 5    albuterol sulfate HFA (VENTOLIN HFA) 108 (90 Base) MCG/ACT inhaler Inhale 2 puffs into the lungs 4 times daily as needed for Wheezing 18 g 0    guaiFENesin (MUCINEX) 600 MG extended release tablet Take 1 tablet by mouth 2 times daily (Patient taking differently: Take 600 mg by mouth 2 times daily as needed ) 20 tablet 1    Elastic Bandages & Supports (B & B ELASTIC ANKLE BRACE) MISC Wear brace when up walking during the day 1 each 0    D-Mannose 500 MG CAPS Take by mouth (Patient not taking: Reported on 5/11/2022)      Vitamins-Lipotropics (BALANCED B-100 COMPLEX CR) TBCR Take 1 tablet by mouth 4 times daily (after meals and at bedtime) (Patient not taking: Reported on 5/11/2022) 120 tablet 5    Cinnamon 500 MG CAPS Take four times daily before meals and at bedtime (Patient not taking: Reported on 5/11/2022) 120 capsule 5     No current facility-administered medications for this visit. Allergies   Allergen Reactions    Latex Rash     And itching    Box Elder     Adhesive Tape Rash       Subjective:    Review of Systems   Constitutional: Negative for chills, fatigue and fever. HENT: Negative for congestion, ear pain, postnasal drip, rhinorrhea and sore throat. Eyes: Negative for discharge and redness. Respiratory: Negative for cough and shortness of breath. Cardiovascular: Negative for chest pain and leg swelling. Gastrointestinal: Negative for abdominal distention, abdominal pain, anal bleeding, blood in stool, constipation, diarrhea and nausea. Musculoskeletal: Positive for arthralgias and gait problem. Skin: Negative for color change and rash. Allergic/Immunologic: Positive for environmental allergies. Neurological: Negative for facial asymmetry, speech difficulty and weakness. Hematological: Does not bruise/bleed easily. Psychiatric/Behavioral: Negative for agitation and confusion. The patient is nervous/anxious.         Objective:     Vitals:    05/11/22 0857   BP: 128/86   Site: Left Upper Arm   Position: Sitting   Cuff Size: Large Adult   Pulse: 88   Resp: 20   Temp: 97.9 °F (36.6 °C)   TempSrc: Temporal   SpO2: 99%   Weight: (!) 318 lb 6.4 oz (144.4 kg)   Height: 5' 7\" (1.702 m)       Body mass index is 49.87 kg/m². Wt Readings from Last 3 Encounters:   05/11/22 (!) 318 lb 6.4 oz (144.4 kg)   04/06/22 (!) 314 lb 6.4 oz (142.6 kg)   01/21/22 (!) 315 lb 3.2 oz (143 kg)     BP Readings from Last 3 Encounters:   05/11/22 128/86   04/06/22 124/84   01/21/22 128/80     Physical Exam  Constitutional:       General: She is not in acute distress. Appearance: She is well-developed. She is not ill-appearing or diaphoretic. HENT:      Head: Normocephalic and atraumatic. Right Ear: Hearing and external ear normal. No decreased hearing noted. Left Ear: Hearing and external ear normal. No decreased hearing noted. Nose: Nose normal. No nasal deformity. Eyes:      General:         Right eye: No discharge. Left eye: No discharge. Conjunctiva/sclera: Conjunctivae normal.   Pulmonary:      Effort: Pulmonary effort is normal. No respiratory distress. Abdominal:      General: There is no distension. Tenderness: There is no guarding. Musculoskeletal:         General: No tenderness or deformity. Normal range of motion. Cervical back: Normal range of motion and neck supple. Feet:    Skin:     Coloration: Skin is not pale. Findings: No erythema or rash (On exposed areas). Neurological:      Mental Status: She is alert. Gait: Gait normal.   Psychiatric:         Speech: Speech normal.         Behavior: Behavior normal.         Thought Content:  Thought content normal.         Judgment: Judgment normal.         Lab Results   Component Value Date    WBC 4.5 (L) 12/14/2021    HGB 14.0 12/14/2021    HCT 41.9 12/14/2021     12/14/2021    CHOL 181 01/29/2019    TRIG 161 01/29/2019    HDL 45 01/29/2019    LDLCALC 104 01/29/2019    AST 10 04/06/2022     04/06/2022    K 4.1 04/06/2022     04/06/2022    CREATININE 0.5 04/06/2022    BUN 10 04/06/2022    CO2 19 (L) 04/06/2022    TSH 1.630 04/06/2022 LABA1C 5.4 04/06/2022    LABGLOM >90 04/06/2022    MG 2.0 01/29/2019    CALCIUM 9.2 04/06/2022    VITD25 24 (L) 01/29/2019     Assessment:       Diagnosis Orders   1. Varicella vaccination status unknown  Varicella Zoster Antibody, IgG   2. Non-seasonal allergic rhinitis, unspecified trigger  loratadine (CLARITIN) 10 MG tablet   3. Irregular menstruation  levonorgestrel-ethinyl estradiol (NORDETTE) 0.15-30 MG-MCG per tablet   4. Menstrual migraine with status migrainosus, not intractable  levonorgestrel-ethinyl estradiol (NORDETTE) 0.15-30 MG-MCG per tablet   5. Anxiety  busPIRone (BUSPAR) 10 MG tablet   6. Screening for hyperlipidemia  Lipid Panel   7. Encounter for hepatitis C screening test for low risk patient  Hepatitis C Antibody   8. Right foot pain  XR FOOT RIGHT (MIN 3 VIEWS)    XR CALCANEUS RIGHT (MIN 2 VIEWS)   9. Pain of right heel  XR FOOT RIGHT (MIN 3 VIEWS)    XR CALCANEUS RIGHT (MIN 2 VIEWS)       Plan:   Get an x-ray of the foot  Refill Claritin, take 2 tablets daily  We will refill your birth control  Order some blood work  We will see you back in 3 months, sooner as needed  Return in about 3 months (around 8/11/2022).     Orders Placed:  Orders Placed This Encounter   Procedures    XR FOOT RIGHT (MIN 3 VIEWS)    XR CALCANEUS RIGHT (MIN 2 VIEWS)    Lipid Panel    Hepatitis C Antibody    Varicella Zoster Antibody, IgG     Medications Prescribed:  Orders Placed This Encounter   Medications    loratadine (CLARITIN) 10 MG tablet     Sig: Take 2 tablets by mouth daily     Dispense:  60 tablet     Refill:  5    busPIRone (BUSPAR) 10 MG tablet     Sig: Take 1 tablet by mouth 3 times daily     Dispense:  90 tablet     Refill:  0    levonorgestrel-ethinyl estradiol (NORDETTE) 0.15-30 MG-MCG per tablet     Sig: Take tablet daily     Dispense:  1 packet     Refill:  11     Future Appointments   Date Time Provider Jennifer Palacio   8/16/2022 11:20 AM EARL Eduardo - CNP [facility] [address] [facility] Patient given educational materials - see patient instructions. Discussed use, benefit, and side effects of prescribedmedications. All patient questions answered. Pt voiced understanding. Reviewed health maintenance. Instructed to continue current medications, diet and exercise. Patient agreed with treatment plan. Follow up as directed.     Electronically signed by EARL Sorto CNP on 5/11/2022 at 11:43 AM

## 2022-05-11 NOTE — TELEPHONE ENCOUNTER
Last visit- 5/11/2022  Next visit- 8/16/2022    Requested Prescriptions     Pending Prescriptions Disp Refills    albuterol (PROVENTIL) (2.5 MG/3ML) 0.083% nebulizer solution [Pharmacy Med Name: ALBUTEROL SUL 2.5 MG/3 ML SOLN] 360 mL      Sig: inhale contents of 1 vial ( 3 milliliters ) in nebulizer by mouth and INTO THE LUNGS every 6 hours if needed wheezing

## 2022-05-11 NOTE — PATIENT INSTRUCTIONS
Thank you   1. Thank you for trusting us with your healthcare needs. You may receive a survey regarding today's visit. It would help us out if you would take a few moments to provide your feedback. We value your input. 2. Please bring in ALL medications BOTTLES, including inhalers, herbal supplements, over the counter, prescribed & non-prescribed medicine. The office would like actual medication bottles and a list.   3. Please note our OFFICE POLICIES:   a. Prior to getting your labs drawn, please check with your insurance company for benefits and eligibility of lab services. Often, insurance companies cover certain tests for preventative visits only. It is patient's responsibility to see what is covered. b. We are unable to change a diagnosis after the test has been performed. c. Lab orders will not be re-printed. Please hold onto your original lab orders and take them to your lab to be completed. d. If you no show your scheduled appointment three times, you will be dismissed from this practice. e. Margert Dakin must be completed 24 hours prior to your schedule appointment. 4. If the list below has been completed, PLEASE FAX RECORDS TO OUR OFFICE @ [phone]. Once the records have been received we will update your records at our office:  Health Maintenance Due   Topic Date Due    Varicella vaccine (1 of 2 - 2-dose childhood series) Never done    COVID-19 Vaccine (1) Never done    Hepatitis C screen  Never done    DTaP/Tdap/Td vaccine (1 - Tdap) Never done    Cervical cancer screen  05/21/2022             Patient Education        Allergies: Care Instructions  Overview     Allergies occur when your body's defense system (immune system) overreacts to certain substances. The immune system treats a harmless substance as if it were a harmful germ or virus. Many things can make this happen. These includepollens, medicine, food, dust, animal dander, and mold. Allergies can be mild or severe.  Mild allergies can be managed with hometreatment. But medicine may be needed to prevent problems. Managing your allergies is an important part of staying healthy. Your doctor may suggest that you have allergy testing to help find out what is causing yourallergies. Severe allergies can cause reactions that affect your whole body (anaphylactic reactions). Your doctor may prescribe a shot of epinephrine to carry with you in case you have a severe reaction. Learn how to give yourself the shot andkeep it with you at all times. Make sure it is not . Follow-up care is a key part of your treatment and safety. Be sure to make and go to all appointments, and call your doctor if you are having problems. It's also a good idea to know your test results and keep alist of the medicines you take. How can you care for yourself at home?  If you have been told by your doctor that dust or dust mites are causing your allergy, decrease the dust around your bed:  ? Wash sheets, pillowcases, and other bedding in hot water every week. ? Use dust-proof covers for pillows, duvets, and mattresses. Avoid plastic covers because they tear easily and do not \"breathe. \" Wash as instructed on the label. ? Do not use any blankets and pillows that you do not need. ? Use blankets that you can wash in your washing machine. ? Consider removing drapes and carpets, which attract and hold dust, from your bedroom.  If you are allergic to house dust and mites, do not use home humidifiers. Your doctor can suggest ways you can control dust and mites.  Look for signs of cockroaches. Cockroaches cause allergic reactions. Use cockroach baits to get rid of them. Then, clean your home well. Cockroaches like areas where grocery bags, newspapers, empty bottles, or cardboard boxes are stored. Do not keep these inside your home, and keep trash and food containers sealed. Seal off any spots where cockroaches might enter your home.    If you are allergic to mold, get rid of furniture, rugs, and drapes that smell musty. Check for mold in the bathroom.  If you are allergic to outdoor pollen or mold spores, use air-conditioning. Change or clean all filters every month. Keep windows closed.  If you are allergic to pollen, stay inside when pollen counts are high. Use a vacuum  with a HEPA filter or a double-thickness filter at least two times each week.  Stay inside when air pollution is bad. Avoid paint fumes, perfumes, and other strong odors.  Avoid conditions that make your allergies worse. Stay away from smoke. Do not smoke or let anyone else smoke in your house. Do not use fireplaces or wood-burning stoves.  If you are allergic to your pets, change the air filter in your furnace every month. Use high-efficiency filters.  If you are allergic to pet dander, keep pets outside or out of your bedroom. Old carpet and cloth furniture can hold a lot of animal dander. You may need to replace them. When should you call for help? Give an epinephrine shot if:     You think you are having a severe allergic reaction.      You have symptoms in more than one body area, such as mild nausea and an itchy mouth. After giving an epinephrine shot call 911, even if you feel better. Call 911 if:     You have symptoms of a severe allergic reaction. These may include:  ? Sudden raised, red areas (hives) all over your body. ? Swelling of the throat, mouth, lips, or tongue. ? Trouble breathing. ? Passing out (losing consciousness). Or you may feel very lightheaded or suddenly feel weak, confused, or restless.      You have been given an epinephrine shot, even if you feel better. Call your doctor now or seek immediate medical care if:     You have symptoms of an allergic reaction, such as:  ? A rash or hives (raised, red areas on the skin). ? Itching. ? Swelling. ? Belly pain, nausea, or vomiting.    Watch closely for changes in your health, and be sure to contact your doctor if:     You do not get better as expected. Where can you learn more? Go to https://chpepiceweb.[email or web address] org and sign in to your [employer] account. Enter F114 in the [facility] box to learn more about \"Allergies: Care Instructions. \"     If you do not have an account, please click on the \"Sign Up Now\" link. Current as of: February 10, 2021               Content Version: 13.2  © 2006-2022 Healthwise, [employer]. Care instructions adapted under license by [facility] ([facility]). If you have questions about a medical condition or this instruction, always ask your healthcare professional. [address] any warranty or liability for your use of this information. Patient Education        Learning About High Cholesterol  What is high cholesterol? High cholesterol means that you have too much cholesterol in your blood. Cholesterol is a type of fat. It's needed for many body functions, such as making new cells. Cholesterol is made by your body. It also comes from food youeat. Having high cholesterol can lead to the buildup of plaque in artery walls. Thiscan increase your risk of heart attack and stroke. When your doctor talks about high cholesterol levels, your doctor is talking about your total cholesterol and LDL cholesterol (the \"bad\" cholesterol) levels. Your doctor may also speak about HDL (the \"good\" cholesterol) levels. High HDL is linked with a lower risk for coronary artery disease, heart attack,and stroke. Your cholesterol levels help your doctor find out your risk for having a heartattack or stroke. How can you help prevent high cholesterol? A heart-healthy lifestyle can help you prevent high cholesterol and lower yourrisk for a heart attack and stroke.  Eat heart-healthy foods. ? Eat fruits, vegetables, whole grains, beans, and other high-fiber foods.   ? Eat lean proteins, such as seafood, lean meats, beans, nuts, and soy products. ? Eat healthy fats, such as canola and olive oil. ? Choose foods that are low in saturated fat. ? Limit sodium and alcohol. ? Limit drinks and foods with added sugar.  Be active. Try to do moderate activity at least 2½ hours a week. Or try vigorous activity at least 1¼ hours a week. You may want to walk or try other activities, such as running, swimming, cycling, or playing tennis or team sports.  Stay at a healthy weight. Lose weight if you need to.  Don't smoke. If you need help quitting, talk to your doctor about stop-smoking programs and medicines. These can increase your chances of quitting for good. How is high cholesterol treated? The goal of treatment is to reduce your chances of having a heart attack orstroke. The goal is not to lower your cholesterol numbers only.  Have a heart-healthy lifestyle. This includes eating healthy foods, not smoking, losing weight, and being more active.  You may choose to take medicine. Follow-up care is a key part of your treatment and safety. Be sure to make and go to all appointments, and call your doctor if you are having problems. It's also a good idea to know your test results and keep alist of the medicines you take. Where can you learn more? Go to https://[email or web address] org and sign in to your [email or web address] account. Enter D754 in the [facility] box to learn more about \"Learning About High Cholesterol. \"     If you do not have an account, please click on the \"Sign Up Now\" link. Current as of: January 10, 2022               Content Version: 13.2  © 2006-2022 Healthwise, Incorporated. Care instructions adapted under license by [facility] ([facility]). If you have questions about a medical condition or this instruction, always ask your healthcare professional. [place] [address] any warranty or liability for your use of this information.

## 2022-05-11 NOTE — PROGRESS NOTES
Health Maintenance Due   Topic Date Due    Varicella vaccine (1 of 2 - 2-dose childhood series) Never done    COVID-19 Vaccine (1) Never done    Hepatitis C screen  Never done    DTaP/Tdap/Td vaccine (1 - Tdap) Never done    Cervical cancer screen  05/21/2022

## 2022-05-12 NOTE — TELEPHONE ENCOUNTER
Patient's last appointment was : 5/11/2022  Patient's next appointment is :   Future Appointments   Date Time Provider Jennifer Palacio   8/16/2022 11:20 AM Iraj Sidhu, APRN - CNP [facility]  [facility] [address]     Last refilled:    Lab Results   Component Value Date    LABA1C 5.4 04/06/2022     Lab Results   Component Value Date    CHOL 181 01/29/2019    TRIG 161 01/29/2019    HDL 45 01/29/2019    LDLCALC 104 01/29/2019     Lab Results   Component Value Date     04/06/2022    K 4.1 04/06/2022     04/06/2022    CO2 19 (L) 04/06/2022    BUN 10 04/06/2022    CREATININE 0.5 04/06/2022    GLUCOSE 111 (H) 04/06/2022    CALCIUM 9.2 04/06/2022    PROT 7.3 04/06/2022    LABALBU 4.2 04/06/2022    BILITOT 0.2 (L) 04/06/2022    ALKPHOS 85 04/06/2022    AST 10 04/06/2022    ALT 14 04/06/2022    LABGLOM >90 04/06/2022     Lab Results   Component Value Date    TSH 1.630 04/06/2022     Lab Results   Component Value Date    WBC 4.5 (L) 12/14/2021    HGB 14.0 12/14/2021    HCT 41.9 12/14/2021    MCV 88.8 12/14/2021     12/14/2021

## 2022-05-13 ENCOUNTER — TELEPHONE (OUTPATIENT)
Dept: FAMILY MEDICINE CLINIC | Age: 32
End: 2022-05-13

## 2022-05-13 DIAGNOSIS — M77.9 BONE SPUR: ICD-10-CM

## 2022-05-13 DIAGNOSIS — M79.671 RIGHT FOOT PAIN: Primary | ICD-10-CM

## 2022-05-13 DIAGNOSIS — M79.671 PAIN OF RIGHT HEEL: ICD-10-CM

## 2022-05-13 RX ORDER — FLUTICASONE PROPIONATE AND SALMETEROL 50; 100 UG/1; UG/1
POWDER RESPIRATORY (INHALATION)
Qty: 1 EACH | Refills: 3 | Status: SHIPPED | OUTPATIENT
Start: 2022-05-13 | End: 2022-08-16 | Stop reason: SDUPTHER

## 2022-05-16 NOTE — TELEPHONE ENCOUNTER
Patient's last appointment was : 5/11/2022  Patient's next appointment is :   Future Appointments   Date Time Provider Jennifer Palacio   8/16/2022 11:20 AM Silvia Hernandez APRN - CNP [facility]  [facility] [address]     Last refilled:    Lab Results   Component Value Date    LABA1C 5.4 04/06/2022     Lab Results   Component Value Date    CHOL 181 01/29/2019    TRIG 161 01/29/2019    HDL 45 01/29/2019    LDLCALC 104 01/29/2019     Lab Results   Component Value Date     04/06/2022    K 4.1 04/06/2022     04/06/2022    CO2 19 (L) 04/06/2022    BUN 10 04/06/2022    CREATININE 0.5 04/06/2022    GLUCOSE 111 (H) 04/06/2022    CALCIUM 9.2 04/06/2022    PROT 7.3 04/06/2022    LABALBU 4.2 04/06/2022    BILITOT 0.2 (L) 04/06/2022    ALKPHOS 85 04/06/2022    AST 10 04/06/2022    ALT 14 04/06/2022    LABGLOM >90 04/06/2022     Lab Results   Component Value Date    TSH 1.630 04/06/2022     Lab Results   Component Value Date    WBC 4.5 (L) 12/14/2021    HGB 14.0 12/14/2021    HCT 41.9 12/14/2021    MCV 88.8 12/14/2021     12/14/2021

## 2022-05-17 RX ORDER — TRIAMCINOLONE ACETONIDE 55 UG/1
SPRAY, METERED NASAL
Qty: 1 EACH | Refills: 1 | Status: SHIPPED | OUTPATIENT
Start: 2022-05-17

## 2022-05-28 DIAGNOSIS — G43.109 MIGRAINE WITH AURA AND WITHOUT STATUS MIGRAINOSUS, NOT INTRACTABLE: ICD-10-CM

## 2022-05-30 DIAGNOSIS — E55.9 VITAMIN D DEFICIENCY: ICD-10-CM

## 2022-05-31 RX ORDER — CHOLECALCIFEROL (VITAMIN D3) 125 MCG
CAPSULE ORAL
Qty: 30 CAPSULE | Refills: 5 | Status: SHIPPED | OUTPATIENT
Start: 2022-05-31

## 2022-05-31 RX ORDER — TOPIRAMATE 50 MG/1
TABLET, FILM COATED ORAL
Qty: 60 TABLET | Refills: 1 | Status: SHIPPED | OUTPATIENT
Start: 2022-05-31 | End: 2022-08-16 | Stop reason: SDUPTHER

## 2022-05-31 NOTE — TELEPHONE ENCOUNTER
Patient's last appointment was : 5/11/2022  Patient's next appointment is :   Future Appointments   Date Time Provider Jennifer Palacio   8/16/2022 11:20 AM EARL Espinal - CNP [facility]     Last refilled:12/14/21    Lab Results   Component Value Date    LABA1C 5.4 04/06/2022     Lab Results   Component Value Date    CHOL 181 01/29/2019    TRIG 161 01/29/2019    HDL 45 01/29/2019    LDLCALC 104 01/29/2019     Lab Results   Component Value Date     04/06/2022    K 4.1 04/06/2022     04/06/2022    CO2 19 (L) 04/06/2022    BUN 10 04/06/2022    CREATININE 0.5 04/06/2022    GLUCOSE 111 (H) 04/06/2022    CALCIUM 9.2 04/06/2022    PROT 7.3 04/06/2022    LABALBU 4.2 04/06/2022    BILITOT 0.2 (L) 04/06/2022    ALKPHOS 85 04/06/2022    AST 10 04/06/2022    ALT 14 04/06/2022    LABGLOM >90 04/06/2022     Lab Results   Component Value Date    TSH 1.630 04/06/2022     Lab Results   Component Value Date    WBC 4.5 (L) 12/14/2021    HGB 14.0 12/14/2021    HCT 41.9 12/14/2021    MCV 88.8 12/14/2021     12/14/2021

## 2022-05-31 NOTE — TELEPHONE ENCOUNTER
Patient's last appointment was : 5/11/2022  Patient's next appointment is :   Future Appointments   Date Time Provider Jennifer Palacio   8/16/2022 11:20 AM Bryce Solorio, APRN - CNP [facility]     Last refilled:1/21/22    Lab Results   Component Value Date    LABA1C 5.4 04/06/2022     Lab Results   Component Value Date    CHOL 181 01/29/2019    TRIG 161 01/29/2019    HDL 45 01/29/2019    LDLCALC 104 01/29/2019     Lab Results   Component Value Date     04/06/2022    K 4.1 04/06/2022     04/06/2022    CO2 19 (L) 04/06/2022    BUN 10 04/06/2022    CREATININE 0.5 04/06/2022    GLUCOSE 111 (H) 04/06/2022    CALCIUM 9.2 04/06/2022    PROT 7.3 04/06/2022    LABALBU 4.2 04/06/2022    BILITOT 0.2 (L) 04/06/2022    ALKPHOS 85 04/06/2022    AST 10 04/06/2022    ALT 14 04/06/2022    LABGLOM >90 04/06/2022     Lab Results   Component Value Date    TSH 1.630 04/06/2022     Lab Results   Component Value Date    WBC 4.5 (L) 12/14/2021    HGB 14.0 12/14/2021    HCT 41.9 12/14/2021    MCV 88.8 12/14/2021     12/14/2021

## 2022-06-03 DIAGNOSIS — F41.9 ANXIETY: ICD-10-CM

## 2022-06-03 RX ORDER — BUSPIRONE HYDROCHLORIDE 5 MG/1
TABLET ORAL
Qty: 90 TABLET | Refills: 5 | Status: SHIPPED | OUTPATIENT
Start: 2022-06-03

## 2022-06-03 NOTE — TELEPHONE ENCOUNTER
Patient's last appointment was : 5/11/2022  Patient's next appointment is :   Future Appointments   Date Time Provider Jennifer Palacio   8/16/2022 11:20 AM EARL Espinal - CNP [facility]  [facility] [address]     Last refilled:    Lab Results   Component Value Date    LABA1C 5.4 04/06/2022     Lab Results   Component Value Date    CHOL 181 01/29/2019    TRIG 161 01/29/2019    HDL 45 01/29/2019    LDLCALC 104 01/29/2019     Lab Results   Component Value Date     04/06/2022    K 4.1 04/06/2022     04/06/2022    CO2 19 (L) 04/06/2022    BUN 10 04/06/2022    CREATININE 0.5 04/06/2022    GLUCOSE 111 (H) 04/06/2022    CALCIUM 9.2 04/06/2022    PROT 7.3 04/06/2022    LABALBU 4.2 04/06/2022    BILITOT 0.2 (L) 04/06/2022    ALKPHOS 85 04/06/2022    AST 10 04/06/2022    ALT 14 04/06/2022    LABGLOM >90 04/06/2022     Lab Results   Component Value Date    TSH 1.630 04/06/2022     Lab Results   Component Value Date    WBC 4.5 (L) 12/14/2021    HGB 14.0 12/14/2021    HCT 41.9 12/14/2021    MCV 88.8 12/14/2021     12/14/2021

## 2022-06-07 DIAGNOSIS — G43.109 MIGRAINE WITH AURA AND WITHOUT STATUS MIGRAINOSUS, NOT INTRACTABLE: ICD-10-CM

## 2022-06-07 NOTE — TELEPHONE ENCOUNTER
Patient's last appointment was : 5/11/2022  Patient's next appointment is : 8/16/22  Future Appointments   Date Time Provider Jennifer Palacio   8/16/2022 11:20 AM Simone Renae APRN - CNP [facility]  [facility] [address]     Last refilled: 3/14/22    Lab Results   Component Value Date    LABA1C 5.4 04/06/2022     Lab Results   Component Value Date    CHOL 181 01/29/2019    TRIG 161 01/29/2019    HDL 45 01/29/2019    LDLCALC 104 01/29/2019     Lab Results   Component Value Date     04/06/2022    K 4.1 04/06/2022     04/06/2022    CO2 19 (L) 04/06/2022    BUN 10 04/06/2022    CREATININE 0.5 04/06/2022    GLUCOSE 111 (H) 04/06/2022    CALCIUM 9.2 04/06/2022    PROT 7.3 04/06/2022    LABALBU 4.2 04/06/2022    BILITOT 0.2 (L) 04/06/2022    ALKPHOS 85 04/06/2022    AST 10 04/06/2022    ALT 14 04/06/2022    LABGLOM >90 04/06/2022     Lab Results   Component Value Date    TSH 1.630 04/06/2022     Lab Results   Component Value Date    WBC 4.5 (L) 12/14/2021    HGB 14.0 12/14/2021    HCT 41.9 12/14/2021    MCV 88.8 12/14/2021     12/14/2021

## 2022-06-08 RX ORDER — SUMATRIPTAN 100 MG/1
TABLET, FILM COATED ORAL
Qty: 9 TABLET | Refills: 1 | Status: SHIPPED | OUTPATIENT
Start: 2022-06-08 | End: 2022-07-22 | Stop reason: SDUPTHER

## 2022-07-22 DIAGNOSIS — G43.109 MIGRAINE WITH AURA AND WITHOUT STATUS MIGRAINOSUS, NOT INTRACTABLE: ICD-10-CM

## 2022-07-25 NOTE — TELEPHONE ENCOUNTER
Patient's last appointment was : 5/11/2022  Patient's next appointment is :   Future Appointments   Date Time Provider Jennifer Palacio   8/16/2022 11:20 AM Argelia Mcmahon, APRN - CNP [facility]     Last refilled:/8/22    Lab Results   Component Value Date    LABA1C 5.4 04/06/2022     Lab Results   Component Value Date    CHOL 181 01/29/2019    TRIG 161 01/29/2019    HDL 45 01/29/2019    LDLCALC 104 01/29/2019     Lab Results   Component Value Date     04/06/2022    K 4.1 04/06/2022     04/06/2022    CO2 19 (L) 04/06/2022    BUN 10 04/06/2022    CREATININE 0.5 04/06/2022    GLUCOSE 111 (H) 04/06/2022    CALCIUM 9.2 04/06/2022    PROT 7.3 04/06/2022    LABALBU 4.2 04/06/2022    BILITOT 0.2 (L) 04/06/2022    ALKPHOS 85 04/06/2022    AST 10 04/06/2022    ALT 14 04/06/2022    LABGLOM >90 04/06/2022     Lab Results   Component Value Date    TSH 1.630 04/06/2022     Lab Results   Component Value Date    WBC 4.5 (L) 12/14/2021    HGB 14.0 12/14/2021    HCT 41.9 12/14/2021    MCV 88.8 12/14/2021     12/14/2021

## 2022-07-26 RX ORDER — SUMATRIPTAN 100 MG/1
TABLET, FILM COATED ORAL
Qty: 9 TABLET | Refills: 1 | Status: SHIPPED | OUTPATIENT
Start: 2022-07-26 | End: 2022-08-16 | Stop reason: SDUPTHER

## 2022-07-27 DIAGNOSIS — N92.6 IRREGULAR MENSTRUATION: ICD-10-CM

## 2022-07-27 DIAGNOSIS — G43.821 MENSTRUAL MIGRAINE WITH STATUS MIGRAINOSUS, NOT INTRACTABLE: ICD-10-CM

## 2022-07-27 RX ORDER — LEVONORGESTREL AND ETHINYL ESTRADIOL 0.15-0.03
KIT ORAL
Qty: 1 PACKET | Refills: 11 | OUTPATIENT
Start: 2022-07-27

## 2022-08-16 ENCOUNTER — OFFICE VISIT (OUTPATIENT)
Dept: FAMILY MEDICINE CLINIC | Age: 32
End: 2022-08-16
Payer: MEDICARE

## 2022-08-16 VITALS
HEART RATE: 84 BPM | DIASTOLIC BLOOD PRESSURE: 84 MMHG | RESPIRATION RATE: 16 BRPM | SYSTOLIC BLOOD PRESSURE: 118 MMHG | BODY MASS INDEX: 45.99 KG/M2 | WEIGHT: 293 LBS | TEMPERATURE: 98 F | HEIGHT: 67 IN

## 2022-08-16 DIAGNOSIS — M43.6 TORTICOLLIS: Primary | ICD-10-CM

## 2022-08-16 DIAGNOSIS — M79.671 RIGHT FOOT PAIN: ICD-10-CM

## 2022-08-16 DIAGNOSIS — G43.109 MIGRAINE WITH AURA AND WITHOUT STATUS MIGRAINOSUS, NOT INTRACTABLE: ICD-10-CM

## 2022-08-16 PROCEDURE — 1036F TOBACCO NON-USER: CPT | Performed by: NURSE PRACTITIONER

## 2022-08-16 PROCEDURE — G8417 CALC BMI ABV UP PARAM F/U: HCPCS | Performed by: NURSE PRACTITIONER

## 2022-08-16 PROCEDURE — 99214 OFFICE O/P EST MOD 30 MIN: CPT | Performed by: NURSE PRACTITIONER

## 2022-08-16 PROCEDURE — G8427 DOCREV CUR MEDS BY ELIG CLIN: HCPCS | Performed by: NURSE PRACTITIONER

## 2022-08-16 RX ORDER — CYCLOBENZAPRINE HCL 10 MG
10 TABLET ORAL NIGHTLY PRN
Qty: 30 TABLET | Refills: 0 | Status: SHIPPED | OUTPATIENT
Start: 2022-08-16 | End: 2022-11-01

## 2022-08-16 RX ORDER — ASPIRIN 325 MG
325 TABLET ORAL DAILY
COMMUNITY

## 2022-08-16 RX ORDER — TOPIRAMATE 50 MG/1
TABLET, FILM COATED ORAL
Qty: 60 TABLET | Refills: 1 | Status: SHIPPED | OUTPATIENT
Start: 2022-08-16

## 2022-08-16 RX ORDER — FLUTICASONE PROPIONATE AND SALMETEROL 50; 100 UG/1; UG/1
POWDER RESPIRATORY (INHALATION)
Qty: 1 EACH | Refills: 3 | Status: SHIPPED | OUTPATIENT
Start: 2022-08-16

## 2022-08-16 RX ORDER — SUMATRIPTAN 100 MG/1
TABLET, FILM COATED ORAL
Qty: 9 TABLET | Refills: 1 | Status: SHIPPED | OUTPATIENT
Start: 2022-08-16 | End: 2022-10-11

## 2022-08-16 SDOH — ECONOMIC STABILITY: FOOD INSECURITY: WITHIN THE PAST 12 MONTHS, THE FOOD YOU BOUGHT JUST DIDN'T LAST AND YOU DIDN'T HAVE MONEY TO GET MORE.: NEVER TRUE

## 2022-08-16 SDOH — ECONOMIC STABILITY: FOOD INSECURITY: WITHIN THE PAST 12 MONTHS, YOU WORRIED THAT YOUR FOOD WOULD RUN OUT BEFORE YOU GOT MONEY TO BUY MORE.: NEVER TRUE

## 2022-08-16 ASSESSMENT — PATIENT HEALTH QUESTIONNAIRE - PHQ9
SUM OF ALL RESPONSES TO PHQ QUESTIONS 1-9: 0
5. POOR APPETITE OR OVEREATING: 0
3. TROUBLE FALLING OR STAYING ASLEEP: 0
SUM OF ALL RESPONSES TO PHQ9 QUESTIONS 1 & 2: 0
SUM OF ALL RESPONSES TO PHQ QUESTIONS 1-9: 0
2. FEELING DOWN, DEPRESSED OR HOPELESS: 0
1. LITTLE INTEREST OR PLEASURE IN DOING THINGS: 0
9. THOUGHTS THAT YOU WOULD BE BETTER OFF DEAD, OR OF HURTING YOURSELF: 0
10. IF YOU CHECKED OFF ANY PROBLEMS, HOW DIFFICULT HAVE THESE PROBLEMS MADE IT FOR YOU TO DO YOUR WORK, TAKE CARE OF THINGS AT HOME, OR GET ALONG WITH OTHER PEOPLE: 0
SUM OF ALL RESPONSES TO PHQ QUESTIONS 1-9: 0
8. MOVING OR SPEAKING SO SLOWLY THAT OTHER PEOPLE COULD HAVE NOTICED. OR THE OPPOSITE, BEING SO FIGETY OR RESTLESS THAT YOU HAVE BEEN MOVING AROUND A LOT MORE THAN USUAL: 0
7. TROUBLE CONCENTRATING ON THINGS, SUCH AS READING THE NEWSPAPER OR WATCHING TELEVISION: 0
4. FEELING TIRED OR HAVING LITTLE ENERGY: 0
SUM OF ALL RESPONSES TO PHQ QUESTIONS 1-9: 0
6. FEELING BAD ABOUT YOURSELF - OR THAT YOU ARE A FAILURE OR HAVE LET YOURSELF OR YOUR FAMILY DOWN: 0

## 2022-08-16 ASSESSMENT — ENCOUNTER SYMPTOMS
COUGH: 0
COLOR CHANGE: 0
SORE THROAT: 0
ABDOMINAL DISTENTION: 0
EYE REDNESS: 0
SHORTNESS OF BREATH: 0
CONSTIPATION: 0
EYE DISCHARGE: 0
DIARRHEA: 0
ANAL BLEEDING: 0
BLOOD IN STOOL: 0
RHINORRHEA: 0
ABDOMINAL PAIN: 0
NAUSEA: 0

## 2022-08-16 ASSESSMENT — SOCIAL DETERMINANTS OF HEALTH (SDOH): HOW HARD IS IT FOR YOU TO PAY FOR THE VERY BASICS LIKE FOOD, HOUSING, MEDICAL CARE, AND HEATING?: NOT HARD AT ALL

## 2022-08-16 NOTE — PROGRESS NOTES
[address] [facility] [address]  Dept: [phone]  Dept Fax: [phone]  Loc: [phone]    Visit Date: 8/16/2022    Siri Israel a 34 y.o. female who presents today for:   Chief Complaint   Patient presents with    3 Month Follow-Up     Does have a tear in right foot following with Dr Kristie Murcia at [address] it is constant for some time      HPI:     Saw Dr. Kristie Murcia - has a tear in her foot - suggested surgery or a cast for 4 weeks - has had the cast for 2 weeks. Neck is always stiff - will sometimes giver her a headaches. HPI  Health Maintenance   Topic Date Due    Varicella vaccine (1 of 2 - 2-dose childhood series) Never done    Hepatitis C screen  Never done    DTaP/Tdap/Td vaccine (1 - Tdap) Never done    Depression Monitoring  07/27/2022    COVID-19 Vaccine (1) 02/16/2023 (Originally 1/16/1991)    Cervical cancer screen  08/16/2023 (Originally 5/21/2022)    Flu vaccine (1) 09/01/2022    HIV screen  Completed    Hepatitis A vaccine  Aged Out    Hepatitis B vaccine  Aged Out    Hib vaccine  Aged Out    Meningococcal (ACWY) vaccine  Aged Out    Pneumococcal 0-64 years Vaccine  Aged Out       Current Outpatient Medications   Medication Sig Dispense Refill    aspirin 325 MG tablet Take 325 mg by mouth in the morning. One to two tablets daily.       SUMAtriptan (IMITREX) 100 MG tablet take 1 tablet by mouth AT ONSET OF HEADACHE may repeat in 2 hours IF headache PERSISTS maximum daily dose of 2 tablets ( 200 milligrams ) every 24 hours 9 tablet 1    topiramate (TOPAMAX) 50 MG tablet take 1 tablet by mouth at bedtime 60 tablet 1    ADVAIR DISKUS 100-50 MCG/ACT AEPB diskus inhaler inhale 1 puff by mouth and INTO THE LUNGS every 12 hours 1 each 3    cyclobenzaprine (FLEXERIL) 10 MG tablet Take 1 tablet by mouth nightly as needed for Muscle spasms 30 tablet 0    busPIRone (BUSPAR) 5 MG tablet take 1 tablet by mouth three times a day if needed for anxiety 90 tablet 5    RA VITAMIN D-3 125 MCG (5000 UT) CAPS capsule take 1 capsule by mouth once daily 30 capsule 5    triamcinolone (NASACORT) 55 MCG/ACT nasal inhaler instill 2 sprays into each nostril once daily 1 each 1    sodium chloride (OCEAN, BABY AYR) 0.65 % nasal spray 1 spray by Nasal route as needed for Congestion      loratadine (CLARITIN) 10 MG tablet Take 2 tablets by mouth daily 60 tablet 5    levonorgestrel-ethinyl estradiol (NORDETTE) 0.15-30 MG-MCG per tablet Take tablet daily 1 packet 11    albuterol (PROVENTIL) (2.5 MG/3ML) 0.083% nebulizer solution inhale contents of 1 vial ( 3 milliliters ) in nebulizer by mouth and INTO THE LUNGS every 6 hours if needed wheezing 360 mL 1    buPROPion (WELLBUTRIN XL) 150 MG extended release tablet Take 1 tablet by mouth every morning 30 tablet 3    Zinc Sulfate (ZINC-220 PO) Take by mouth daily       Respiratory Therapy Supplies (NEBULIZER/TUBING/MOUTHPIECE) KIT 1 kit by Does not apply route daily as needed (wheezing) 1 kit 0    montelukast (SINGULAIR) 10 MG tablet take 1 tablet by mouth once daily 180 tablet 1    Omega-3 Fatty Acids (RA FISH OIL) 1000 MG CAPS Take 1 capsule 3 times daily 90 capsule 5    Vitamins-Lipotropics (BALANCED B-100 COMPLEX CR) TBCR Take 1 tablet by mouth 4 times daily (after meals and at bedtime) 120 tablet 5    albuterol sulfate HFA (VENTOLIN HFA) 108 (90 Base) MCG/ACT inhaler Inhale 2 puffs into the lungs 4 times daily as needed for Wheezing 18 g 0    guaiFENesin (MUCINEX) 600 MG extended release tablet Take 1 tablet by mouth 2 times daily (Patient taking differently: Take 600 mg by mouth 2 times daily as needed) 20 tablet 1    D-Mannose 500 MG CAPS Take by mouth (Patient not taking: No sig reported)      Cranberry 500 MG CAPS Take 1 capsule by mouth 2 times daily (Patient not taking: Reported on 8/16/2022) 60 capsule 5    Nebulizers (PORTABLE COMPRESSOR NEBULIZER) MISC Use daily as needed 1 each 0 fluticasone (FLONASE) 50 MCG/ACT nasal spray instill 1 spray into each nostril once daily (Patient not taking: Reported on 8/16/2022) 1 each 3    Cinnamon 500 MG CAPS Take four times daily before meals and at bedtime (Patient not taking: No sig reported) 120 capsule 5    Elastic Bandages & Supports (B & B ELASTIC ANKLE BRACE) MISC Wear brace when up walking during the day (Patient not taking: Reported on 8/16/2022) 1 each 0     No current facility-administered medications for this visit. Allergies   Allergen Reactions    Latex Rash     And itching    Box [place]     Adhesive Tape Rash       Subjective:   Review of Systems   Constitutional:  Negative for chills, fatigue and fever. HENT:  Negative for congestion, ear pain, postnasal drip, rhinorrhea and sore throat. Eyes:  Negative for discharge and redness. Respiratory:  Negative for cough and shortness of breath. Cardiovascular:  Negative for chest pain and leg swelling. Gastrointestinal:  Negative for abdominal distention, abdominal pain, anal bleeding, blood in stool, constipation, diarrhea and nausea. Musculoskeletal:  Positive for arthralgias, gait problem and myalgias. Skin:  Negative for color change and rash. Neurological:  Negative for facial asymmetry, speech difficulty and weakness. Hematological:  Does not bruise/bleed easily. Psychiatric/Behavioral:  Negative for agitation and confusion. Objective:     Vitals:    08/16/22 0959   BP: 118/84   Pulse: 84   Resp: 16   Temp: 98 °F (36.7 °C)   TempSrc: Oral   Weight: (!) 318 lb 12.8 oz (144.6 kg)   Height: 5' 7\" (1.702 m)       Body mass index is 49.93 kg/m².     Wt Readings from Last 3 Encounters:   08/16/22 (!) 318 lb 12.8 oz (144.6 kg)   05/11/22 (!) 318 lb 6.4 oz (144.4 kg)   04/06/22 (!) 314 lb 6.4 oz (142.6 kg)     BP Readings from Last 3 Encounters:   08/16/22 118/84   05/11/22 128/86   04/06/22 124/84       Physical Exam  Constitutional:       General: She is not in acute distress. Appearance: Normal appearance. She is well-developed. She is not ill-appearing or diaphoretic. HENT:      Head: Normocephalic and atraumatic. Right Ear: Hearing and external ear normal. No decreased hearing noted. Left Ear: Hearing and external ear normal. No decreased hearing noted. Nose: Nose normal. No nasal deformity. Eyes:      General:         Right eye: No discharge. Left eye: No discharge. Conjunctiva/sclera: Conjunctivae normal.   Pulmonary:      Effort: Pulmonary effort is normal. No respiratory distress. Abdominal:      General: There is no distension. Tenderness: There is no guarding. Musculoskeletal:         General: No tenderness or deformity. Normal range of motion. Cervical back: Normal range of motion and neck supple. Skin:     Coloration: Skin is not pale. Findings: No erythema or rash (On exposed areas). Neurological:      General: No focal deficit present. Mental Status: She is alert and oriented to person, place, and time. Gait: Gait normal.   Psychiatric:         Mood and Affect: Mood normal.         Speech: Speech normal.         Behavior: Behavior normal.         Thought Content: Thought content normal.         Judgment: Judgment normal.       Lab Results   Component Value Date    WBC 4.5 (L) 12/14/2021    HGB 14.0 12/14/2021    HCT 41.9 12/14/2021     12/14/2021    CHOL 181 01/29/2019    TRIG 161 01/29/2019    HDL 45 01/29/2019    LDLCALC 104 01/29/2019    AST 10 04/06/2022     04/06/2022    K 4.1 04/06/2022     04/06/2022    CREATININE 0.5 04/06/2022    BUN 10 04/06/2022    CO2 19 (L) 04/06/2022    TSH 1.630 04/06/2022    LABA1C 5.4 04/06/2022    LABGLOM >90 04/06/2022    MG 2.0 01/29/2019    CALCIUM 9.2 04/06/2022    VITD25 24 (L) 01/29/2019       :       Diagnosis Orders   1. Torticollis  cyclobenzaprine (FLEXERIL) 10 MG tablet      2. Right foot pain        3.  Migraine with aura and without 8/16/2022 at 10:30 AM

## 2022-10-07 DIAGNOSIS — G43.109 MIGRAINE WITH AURA AND WITHOUT STATUS MIGRAINOSUS, NOT INTRACTABLE: ICD-10-CM

## 2022-10-11 RX ORDER — SUMATRIPTAN 100 MG/1
TABLET, FILM COATED ORAL
Qty: 9 TABLET | Refills: 1 | Status: SHIPPED | OUTPATIENT
Start: 2022-10-11

## 2022-10-30 DIAGNOSIS — F41.9 ANXIETY: ICD-10-CM

## 2022-10-30 DIAGNOSIS — M43.6 TORTICOLLIS: ICD-10-CM

## 2022-10-31 NOTE — TELEPHONE ENCOUNTER
This medication refill is regarding a electronic request.  Refill requested by  [employer] . Requested Prescriptions     Pending Prescriptions Disp Refills    cyclobenzaprine (FLEXERIL) 10 MG tablet [Pharmacy Med Name: CYCLOBENZAPRINE 10 MG TABLET] 30 tablet 0     Sig: take 1 tablet by mouth nightly if needed for muscle spasm    buPROPion (WELLBUTRIN XL) 150 MG extended release tablet [Pharmacy Med Name: BUPROPION HCL  MG TABLET] 30 tablet 3     Sig: take 1 tablet by mouth every morning     Date of last visit: 8/16/2022   Date of next visit: None  Date of last refill:    -Wellbutrin 4/6/22 #30/3   -Flexeril 8/16/22 #30/0    Rx verified, ordered and set to EP.

## 2022-11-01 RX ORDER — BUPROPION HYDROCHLORIDE 150 MG/1
150 TABLET ORAL EVERY MORNING
Qty: 30 TABLET | Refills: 3 | Status: SHIPPED | OUTPATIENT
Start: 2022-11-01

## 2022-11-01 RX ORDER — CYCLOBENZAPRINE HCL 10 MG
TABLET ORAL
Qty: 30 TABLET | Refills: 0 | Status: SHIPPED | OUTPATIENT
Start: 2022-11-01

## 2022-12-07 DIAGNOSIS — F41.9 ANXIETY: ICD-10-CM

## 2022-12-07 RX ORDER — BUSPIRONE HYDROCHLORIDE 5 MG/1
TABLET ORAL
Qty: 90 TABLET | Refills: 5 | Status: SHIPPED | OUTPATIENT
Start: 2022-12-07

## 2022-12-07 NOTE — TELEPHONE ENCOUNTER
Patient's last appointment was : 5/11/2022  Patient's next appointment is : No future appointments.   Last refilled:11/1/22    Lab Results   Component Value Date    LABA1C 5.4 04/06/2022     Lab Results   Component Value Date    CHOL 181 01/29/2019    TRIG 161 01/29/2019    HDL 45 01/29/2019    LDLCALC 104 01/29/2019     Lab Results   Component Value Date     04/06/2022    K 4.1 04/06/2022     04/06/2022    CO2 19 (L) 04/06/2022    BUN 10 04/06/2022    CREATININE 0.5 04/06/2022    GLUCOSE 111 (H) 04/06/2022    CALCIUM 9.2 04/06/2022    PROT 7.3 04/06/2022    LABALBU 4.2 04/06/2022    BILITOT 0.2 (L) 04/06/2022    ALKPHOS 85 04/06/2022    AST 10 04/06/2022    ALT 14 04/06/2022    LABGLOM >90 04/06/2022     Lab Results   Component Value Date    TSH 1.630 04/06/2022     Lab Results   Component Value Date    WBC 4.5 (L) 12/14/2021    HGB 14.0 12/14/2021    HCT 41.9 12/14/2021    MCV 88.8 12/14/2021     12/14/2021

## 2022-12-10 DIAGNOSIS — G43.109 MIGRAINE WITH AURA AND WITHOUT STATUS MIGRAINOSUS, NOT INTRACTABLE: ICD-10-CM

## 2022-12-12 RX ORDER — SUMATRIPTAN 100 MG/1
TABLET, FILM COATED ORAL
Qty: 9 TABLET | Refills: 1 | Status: SHIPPED | OUTPATIENT
Start: 2022-12-12

## 2022-12-12 NOTE — TELEPHONE ENCOUNTER
This medication refill is regarding a electronic request. Refill requested by  [id number] . Requested Prescriptions     Pending Prescriptions Disp Refills    SUMAtriptan (IMITREX) 100 MG tablet [Pharmacy Med Name: SUMATRIPTAN SUCC 100 MG TABLET] 9 tablet 1     Sig: take 1 tablet by mouth AT ONSET OF HEADACHE may repeat in 2 hours IF headache PERSISTS maximum daily dose of 2 tablets ( 200 milligrams ) every 24 hours     Date of last visit: 8/16/2022   Date of next visit: None  Date of last refill: 10/11/22 #9/1    Rx verified, ordered and set to EP.

## 2022-12-25 DIAGNOSIS — G43.109 MIGRAINE WITH AURA AND WITHOUT STATUS MIGRAINOSUS, NOT INTRACTABLE: ICD-10-CM

## 2022-12-27 RX ORDER — TOPIRAMATE 50 MG/1
TABLET, FILM COATED ORAL
Qty: 60 TABLET | Refills: 1 | Status: SHIPPED | OUTPATIENT
Start: 2022-12-27

## 2022-12-27 NOTE — TELEPHONE ENCOUNTER
This medication refill is regarding a electronic request. Refill requested by  [facility] . Requested Prescriptions     Pending Prescriptions Disp Refills    topiramate (TOPAMAX) 50 MG tablet [Pharmacy Med Name: TOPIRAMATE 50 MG TABLET] 60 tablet 1     Sig: take 1 tablet by mouth at bedtime       Date of last visit: 8/16/2022   Date of next visit: None  Date of last refill: 8/16/2022 #60/1  Pharmacy Name: [facility]      Rx verified, ordered and set to EP.

## 2023-01-03 DIAGNOSIS — M43.6 TORTICOLLIS: ICD-10-CM

## 2023-01-04 RX ORDER — CYCLOBENZAPRINE HCL 10 MG
TABLET ORAL
Qty: 30 TABLET | Refills: 0 | Status: SHIPPED | OUTPATIENT
Start: 2023-01-04

## 2023-01-04 NOTE — TELEPHONE ENCOUNTER
This medication refill is regarding a electronic request. Refill requested by  [employer] . Requested Prescriptions     Pending Prescriptions Disp Refills    cyclobenzaprine (FLEXERIL) 10 MG tablet [Pharmacy Med Name: CYCLOBENZAPRINE 10 MG TABLET] 30 tablet 0     Sig: take 1 tablet by mouth nightly if needed for muscle spasm     Date of last visit: 8/16/2022   Date of next visit: None  Date of last refill: 11/1/22 #30/0    Rx verified, ordered and set to EP.

## 2023-01-07 DIAGNOSIS — J01.01 ACUTE RECURRENT MAXILLARY SINUSITIS: ICD-10-CM

## 2023-01-09 RX ORDER — FLUTICASONE PROPIONATE AND SALMETEROL 50; 100 UG/1; UG/1
POWDER RESPIRATORY (INHALATION)
Qty: 60 EACH | Refills: 2 | Status: SHIPPED | OUTPATIENT
Start: 2023-01-09

## 2023-01-09 RX ORDER — MONTELUKAST SODIUM 10 MG/1
TABLET ORAL
Qty: 180 TABLET | Refills: 1 | Status: SHIPPED | OUTPATIENT
Start: 2023-01-09

## 2023-01-09 NOTE — TELEPHONE ENCOUNTER
Patient's last appointment was : 8/16/2022  Patient's next appointment is : No future appointments.   Last refilled:12/14/21    Lab Results   Component Value Date    LABA1C 5.4 04/06/2022     Lab Results   Component Value Date    CHOL 181 01/29/2019    TRIG 161 01/29/2019    HDL 45 01/29/2019    LDLCALC 104 01/29/2019     Lab Results   Component Value Date     04/06/2022    K 4.1 04/06/2022     04/06/2022    CO2 19 (L) 04/06/2022    BUN 10 04/06/2022    CREATININE 0.5 04/06/2022    GLUCOSE 111 (H) 04/06/2022    CALCIUM 9.2 04/06/2022    PROT 7.3 04/06/2022    LABALBU 4.2 04/06/2022    BILITOT 0.2 (L) 04/06/2022    ALKPHOS 85 04/06/2022    AST 10 04/06/2022    ALT 14 04/06/2022    LABGLOM >90 04/06/2022     Lab Results   Component Value Date    TSH 1.630 04/06/2022     Lab Results   Component Value Date    WBC 4.5 (L) 12/14/2021    HGB 14.0 12/14/2021    HCT 41.9 12/14/2021    MCV 88.8 12/14/2021     12/14/2021

## 2023-01-09 NOTE — TELEPHONE ENCOUNTER
This medication refill is regarding a electronic request. Refill requested by  [employer] . Requested Prescriptions     Pending Prescriptions Disp Refills    ADVAIR DISKUS 100-50 MCG/ACT AEPB diskus inhaler [Pharmacy Med Name: Luis Engle 100-50 DISKUS]       Sig: inhale 1 puff by mouth and INTO THE LUNGS every 12 hours Rinse mouth after use     Date of last visit: 8/16/2022   Date of next visit: None  Date of last refill: 8/16/22 #1/3    Rx verified, ordered and set to EP.

## 2023-02-01 DIAGNOSIS — M43.6 TORTICOLLIS: ICD-10-CM

## 2023-02-01 DIAGNOSIS — E55.9 VITAMIN D DEFICIENCY: ICD-10-CM

## 2023-02-01 NOTE — TELEPHONE ENCOUNTER
Patient's last appointment was : 08/16/2022  Patient's next appointment is :  None at this time.   Last refilled: Flexeril 01/04/2023  Vit D3 05/31/2022  Nasacort 05/17/2022  Yes Pharmacy Verified    Lab Results   Component Value Date    LABA1C 5.4 04/06/2022     Lab Results   Component Value Date    CHOL 181 01/29/2019    TRIG 161 01/29/2019    HDL 45 01/29/2019    LDLCALC 104 01/29/2019     Lab Results   Component Value Date     04/06/2022    K 4.1 04/06/2022     04/06/2022    CO2 19 (L) 04/06/2022    BUN 10 04/06/2022    CREATININE 0.5 04/06/2022    GLUCOSE 111 (H) 04/06/2022    CALCIUM 9.2 04/06/2022    PROT 7.3 04/06/2022    LABALBU 4.2 04/06/2022    BILITOT 0.2 (L) 04/06/2022    ALKPHOS 85 04/06/2022    AST 10 04/06/2022    ALT 14 04/06/2022    LABGLOM >90 04/06/2022     Lab Results   Component Value Date    TSH 1.630 04/06/2022     Lab Results   Component Value Date    WBC 4.5 (L) 12/14/2021    HGB 14.0 12/14/2021    HCT 41.9 12/14/2021    MCV 88.8 12/14/2021     12/14/2021

## 2023-02-02 RX ORDER — CYCLOBENZAPRINE HCL 10 MG
TABLET ORAL
Qty: 30 TABLET | Refills: 0 | Status: SHIPPED | OUTPATIENT
Start: 2023-02-02

## 2023-02-02 RX ORDER — TRIAMCINOLONE ACETONIDE 55 UG/1
SPRAY, METERED NASAL
Qty: 1 EACH | Refills: 1 | Status: SHIPPED | OUTPATIENT
Start: 2023-02-02

## 2023-02-19 DIAGNOSIS — G43.109 MIGRAINE WITH AURA AND WITHOUT STATUS MIGRAINOSUS, NOT INTRACTABLE: ICD-10-CM

## 2023-02-20 RX ORDER — SUMATRIPTAN 100 MG/1
TABLET, FILM COATED ORAL
Qty: 9 TABLET | Refills: 5 | Status: SHIPPED | OUTPATIENT
Start: 2023-02-20 | End: 2023-03-16 | Stop reason: ALTCHOICE

## 2023-03-03 DIAGNOSIS — M43.6 TORTICOLLIS: ICD-10-CM

## 2023-03-06 RX ORDER — CYCLOBENZAPRINE HCL 10 MG
TABLET ORAL
Qty: 30 TABLET | Refills: 0 | Status: SHIPPED | OUTPATIENT
Start: 2023-03-06

## 2023-03-06 NOTE — TELEPHONE ENCOUNTER
This medication refill is regarding a electronic request. Refill requested by  [employer] . Requested Prescriptions     Pending Prescriptions Disp Refills    cyclobenzaprine (FLEXERIL) 10 MG tablet [Pharmacy Med Name: CYCLOBENZAPRINE 10 MG TABLET] 30 tablet 0     Sig: take 1 tablet by mouth nightly if needed for muscle spasm     Date of last visit: 8/16/2022   Date of next visit: None  Date of last refill: 2/2/23 #30/0    Rx verified, ordered and set to EP.

## 2023-03-07 DIAGNOSIS — F41.9 ANXIETY: ICD-10-CM

## 2023-03-07 RX ORDER — BUPROPION HYDROCHLORIDE 150 MG/1
150 TABLET ORAL EVERY MORNING
Qty: 30 TABLET | Refills: 1 | Status: SHIPPED | OUTPATIENT
Start: 2023-03-07 | End: 2023-05-04

## 2023-03-09 NOTE — TELEPHONE ENCOUNTER
1st attempt:    Tried to call pt to let her know prescription was sent in and that she is need of a follow up, but pt's VM was full so I was not able to leave a message. Will try again later.

## 2023-03-14 SDOH — ECONOMIC STABILITY: FOOD INSECURITY: WITHIN THE PAST 12 MONTHS, THE FOOD YOU BOUGHT JUST DIDN'T LAST AND YOU DIDN'T HAVE MONEY TO GET MORE.: NEVER TRUE

## 2023-03-14 SDOH — ECONOMIC STABILITY: FOOD INSECURITY: WITHIN THE PAST 12 MONTHS, YOU WORRIED THAT YOUR FOOD WOULD RUN OUT BEFORE YOU GOT MONEY TO BUY MORE.: NEVER TRUE

## 2023-03-14 SDOH — ECONOMIC STABILITY: TRANSPORTATION INSECURITY
IN THE PAST 12 MONTHS, HAS LACK OF TRANSPORTATION KEPT YOU FROM MEETINGS, WORK, OR FROM GETTING THINGS NEEDED FOR DAILY LIVING?: NO

## 2023-03-14 SDOH — ECONOMIC STABILITY: HOUSING INSECURITY
IN THE LAST 12 MONTHS, WAS THERE A TIME WHEN YOU DID NOT HAVE A STEADY PLACE TO SLEEP OR SLEPT IN A SHELTER (INCLUDING NOW)?: NO

## 2023-03-14 SDOH — ECONOMIC STABILITY: INCOME INSECURITY: HOW HARD IS IT FOR YOU TO PAY FOR THE VERY BASICS LIKE FOOD, HOUSING, MEDICAL CARE, AND HEATING?: NOT VERY HARD

## 2023-03-16 ENCOUNTER — HOSPITAL ENCOUNTER (OUTPATIENT)
Dept: GENERAL RADIOLOGY | Age: 33
Discharge: HOME | End: 2023-03-16
Payer: MEDICAID

## 2023-03-16 ENCOUNTER — OFFICE VISIT (OUTPATIENT)
Dept: FAMILY MEDICINE CLINIC | Age: 33
End: 2023-03-16
Payer: MEDICAID

## 2023-03-16 ENCOUNTER — HOSPITAL ENCOUNTER (OUTPATIENT)
Age: 33
Discharge: HOME | End: 2023-03-16
Payer: MEDICAID

## 2023-03-16 VITALS
WEIGHT: 293 LBS | SYSTOLIC BLOOD PRESSURE: 120 MMHG | HEART RATE: 60 BPM | DIASTOLIC BLOOD PRESSURE: 60 MMHG | RESPIRATION RATE: 16 BRPM | BODY MASS INDEX: 50.49 KG/M2

## 2023-03-16 DIAGNOSIS — M54.2 NECK PAIN: ICD-10-CM

## 2023-03-16 DIAGNOSIS — G43.109 MIGRAINE WITH AURA AND WITHOUT STATUS MIGRAINOSUS, NOT INTRACTABLE: ICD-10-CM

## 2023-03-16 DIAGNOSIS — L71.9 ROSACEA: ICD-10-CM

## 2023-03-16 DIAGNOSIS — M54.2 NECK PAIN: Primary | ICD-10-CM

## 2023-03-16 LAB
25(OH)D3 SERPL-MCNC: 29 NG/ML (ref 30–100)
ALBUMIN SERPL BCG-MCNC: 4.3 G/DL (ref 3.5–5.1)
ALP SERPL-CCNC: 87 U/L (ref 38–126)
ALT SERPL W/O P-5'-P-CCNC: 15 U/L (ref 11–66)
ANION GAP SERPL CALC-SCNC: 13 MEQ/L (ref 8–16)
AST SERPL-CCNC: 11 U/L (ref 5–40)
BASOPHILS ABSOLUTE: 0 THOU/MM3 (ref 0–0.1)
BASOPHILS NFR BLD AUTO: 0.3 %
BILIRUB CONJ SERPL-MCNC: < 0.2 MG/DL (ref 0–0.3)
BILIRUB SERPL-MCNC: 0.2 MG/DL (ref 0.3–1.2)
BUN SERPL-MCNC: 8 MG/DL (ref 7–22)
CALCIUM SERPL-MCNC: 9.5 MG/DL (ref 8.5–10.5)
CHLORIDE SERPL-SCNC: 109 MEQ/L (ref 98–111)
CHOLEST SERPL-MCNC: 215 MG/DL (ref 100–199)
CO2 SERPL-SCNC: 23 MEQ/L (ref 23–33)
CREAT SERPL-MCNC: 0.6 MG/DL (ref 0.4–1.2)
DEPRECATED RDW RBC AUTO: 44 FL (ref 35–45)
EOSINOPHIL NFR BLD AUTO: 1 %
EOSINOPHILS ABSOLUTE: 0.1 THOU/MM3 (ref 0–0.4)
ERYTHROCYTE [DISTWIDTH] IN BLOOD BY AUTOMATED COUNT: 13.5 % (ref 11.5–14.5)
GFR SERPL CREATININE-BSD FRML MDRD: > 60 ML/MIN/1.73M2
GLUCOSE SERPL-MCNC: 96 MG/DL (ref 70–108)
HCT VFR BLD AUTO: 44.9 % (ref 37–47)
HDLC SERPL-MCNC: 45 MG/DL
HGB BLD-MCNC: 14.9 GM/DL (ref 12–16)
IMM GRANULOCYTES # BLD AUTO: 0.02 THOU/MM3 (ref 0–0.07)
IMM GRANULOCYTES NFR BLD AUTO: 0.3 %
LDLC SERPL CALC-MCNC: 151 MG/DL
LYMPHOCYTES ABSOLUTE: 1.8 THOU/MM3 (ref 1–4.8)
LYMPHOCYTES NFR BLD AUTO: 23.5 %
MCH RBC QN AUTO: 29.7 PG (ref 26–33)
MCHC RBC AUTO-ENTMCNC: 33.2 GM/DL (ref 32.2–35.5)
MCV RBC AUTO: 89.4 FL (ref 81–99)
MONOCYTES ABSOLUTE: 0.4 THOU/MM3 (ref 0.4–1.3)
MONOCYTES NFR BLD AUTO: 5.3 %
NEUTROPHILS NFR BLD AUTO: 69.6 %
NRBC BLD AUTO-RTO: 0 /100 WBC
PLATELET # BLD AUTO: 330 THOU/MM3 (ref 130–400)
PMV BLD AUTO: 9.9 FL (ref 9.4–12.4)
POTASSIUM SERPL-SCNC: 4.7 MEQ/L (ref 3.5–5.2)
PROT SERPL-MCNC: 6.9 G/DL (ref 6.1–8)
RBC # BLD AUTO: 5.02 MILL/MM3 (ref 4.2–5.4)
SEGMENTED NEUTROPHILS ABSOLUTE COUNT: 5.4 THOU/MM3 (ref 1.8–7.7)
SODIUM SERPL-SCNC: 145 MEQ/L (ref 135–145)
TRIGL SERPL-MCNC: 94 MG/DL (ref 0–199)
TSH SERPL DL<=0.005 MIU/L-ACNC: 1.68 UIU/ML (ref 0.4–4.2)
WBC # BLD AUTO: 7.7 THOU/MM3 (ref 4.8–10.8)

## 2023-03-16 PROCEDURE — 72040 X-RAY EXAM NECK SPINE 2-3 VW: CPT

## 2023-03-16 PROCEDURE — 84443 ASSAY THYROID STIM HORMONE: CPT

## 2023-03-16 PROCEDURE — 82785 ASSAY OF IGE: CPT

## 2023-03-16 PROCEDURE — 80061 LIPID PANEL: CPT

## 2023-03-16 PROCEDURE — 82248 BILIRUBIN DIRECT: CPT

## 2023-03-16 PROCEDURE — 99213 OFFICE O/P EST LOW 20 MIN: CPT | Performed by: NURSE PRACTITIONER

## 2023-03-16 PROCEDURE — 36415 COLL VENOUS BLD VENIPUNCTURE: CPT

## 2023-03-16 PROCEDURE — 80053 COMPREHEN METABOLIC PANEL: CPT

## 2023-03-16 PROCEDURE — 85025 COMPLETE CBC W/AUTO DIFF WBC: CPT

## 2023-03-16 PROCEDURE — 86003 ALLG SPEC IGE CRUDE XTRC EA: CPT

## 2023-03-16 PROCEDURE — 82306 VITAMIN D 25 HYDROXY: CPT

## 2023-03-16 RX ORDER — UBROGEPANT 50 MG/1
TABLET ORAL
Qty: 30 TABLET | Status: CANCELLED | OUTPATIENT
Start: 2023-03-16

## 2023-03-16 RX ORDER — TOPIRAMATE 50 MG/1
TABLET, FILM COATED ORAL
Qty: 90 TABLET | Refills: 1 | Status: SHIPPED | OUTPATIENT
Start: 2023-03-16

## 2023-03-16 RX ORDER — RIZATRIPTAN BENZOATE 10 MG/1
10 TABLET ORAL
Qty: 30 TABLET | Refills: 1 | Status: SHIPPED | OUTPATIENT
Start: 2023-03-16 | End: 2023-03-16

## 2023-03-16 NOTE — PROGRESS NOTES
[facility]  [address] [place] [id number]  Dept: [phone]  Loc: [phone]      Visit Date: 3/16/2023    Lauren Gamez is a 34 y.o. female who presents today for:  Chief Complaint   Patient presents with    6 Month Follow-Up     Pt would like to discuss her sumatriptan medication. HPI:     Migraine:  Has been taking Topamax 50 mg nightly for migraines. Also has been taking Imitrex as needed for breakthrough headaches. States that when she takes the Imitrex she gets some nausea and has vomited on occasion. Was wondering about switching to something different. Reports that her family members have suffered with migraines in the past, they will take the Maxalt and do very well. She was curious if this was something we could try for her. Neck pain:  Has had some ongoing neck stiffness. Takes the Flexeril as needed and it does take the edge off. Was wondering if her neck pain could possibly be a trigger to her headach  Has not had any x-rays done of her neck. Face rash:  Has a chronic \"rash\" on her cheeks. Red and dry. Has not really tried anything for this in the past.    In the past we have done autoimmune blood work which all came back negative. Still has lab orders in the computer to check for allergies.     HPI  Health Maintenance   Topic Date Due    COVID-19 Vaccine (1) Never done    Varicella vaccine (1 of 2 - 2-dose childhood series) Never done    Hepatitis C screen  Never done    DTaP/Tdap/Td vaccine (1 - Tdap) Never done    Flu vaccine (1) Never done    Cervical cancer screen  08/16/2023 (Originally 5/21/2022)    Depression Monitoring  08/16/2023    HIV screen  Completed    Hepatitis A vaccine  Aged Out    Hib vaccine  Aged Out    Meningococcal (ACWY) vaccine  Aged Out    Pneumococcal 0-64 years Vaccine  Aged Out     Past Medical History:   Diagnosis Date    Anxiety 06/2016    COVID-19     Postpartum depression 2010 Past Surgical History:   Procedure Laterality Date    WISDOM TOOTH EXTRACTION       Family History   Problem Relation Age of Onset    [de-identified] / [place] Mother     High Blood Pressure Father     High Blood Pressure Brother     Diabetes Maternal Grandmother      Social History     Tobacco Use    Smoking status: Former     Types: Cigarettes     Quit date: 2006     Years since quittin.8    Smokeless tobacco: Never   Substance Use Topics    Alcohol use: No      Current Outpatient Medications   Medication Sig Dispense Refill    rizatriptan (MAXALT) 10 MG tablet Take 1 tablet by mouth once as needed for Migraine May repeat in 2 hours if needed 30 tablet 1    topiramate (TOPAMAX) 50 MG tablet take 1 tablet by mouth at bedtime 90 tablet 1    buPROPion (WELLBUTRIN XL) 150 MG extended release tablet take 1 tablet by mouth every morning 30 tablet 1    cyclobenzaprine (FLEXERIL) 10 MG tablet take 1 tablet by mouth nightly if needed for muscle spasm 30 tablet 0    triamcinolone (NASACORT) 55 MCG/ACT nasal inhaler instill 2 sprays into each nostril once daily 1 each 1    Cholecalciferol (VITAMIN D3) 125 MCG (5000 UT) CAPS take 1 capsule by mouth once daily 30 capsule 5    montelukast (SINGULAIR) 10 MG tablet take 1 tablet by mouth once daily 180 tablet 1    ADVAIR DISKUS 100-50 MCG/ACT AEPB diskus inhaler inhale 1 puff by mouth and INTO THE LUNGS every 12 hours Rinse mouth after use 60 each 2    busPIRone (BUSPAR) 5 MG tablet take 1 tablet by mouth three times a day if needed for anxiety 90 tablet 5    sodium chloride (OCEAN, BABY AYR) 0.65 % nasal spray 1 spray by Nasal route as needed for Congestion      loratadine (CLARITIN) 10 MG tablet Take 2 tablets by mouth daily 60 tablet 5    levonorgestrel-ethinyl estradiol (NORDETTE) 0.15-30 MG-MCG per tablet Take tablet daily 1 packet 11    albuterol (PROVENTIL) (2.5 MG/3ML) 0.083% nebulizer solution inhale contents of 1 vial ( 3 milliliters ) in nebulizer by mouth and INTO THE LUNGS every 6 hours if needed wheezing 360 mL 1    Nebulizers (PORTABLE COMPRESSOR NEBULIZER) MISC Use daily as needed 1 each 0    Zinc Sulfate (ZINC-220 PO) Take by mouth daily       Respiratory Therapy Supplies (NEBULIZER/TUBING/MOUTHPIECE) KIT 1 kit by Does not apply route daily as needed (wheezing) 1 kit 0    fluticasone (FLONASE) 50 MCG/ACT nasal spray instill 1 spray into each nostril once daily 1 each 3    Omega-3 Fatty Acids (RA FISH OIL) 1000 MG CAPS Take 1 capsule 3 times daily 90 capsule 5    Vitamins-Lipotropics (BALANCED B-100 COMPLEX CR) TBCR Take 1 tablet by mouth 4 times daily (after meals and at bedtime) 120 tablet 5    albuterol sulfate HFA (VENTOLIN HFA) 108 (90 Base) MCG/ACT inhaler Inhale 2 puffs into the lungs 4 times daily as needed for Wheezing 18 g 0    D-Mannose 500 MG CAPS Take by mouth (Patient not taking: No sig reported)      Cranberry 500 MG CAPS Take 1 capsule by mouth 2 times daily (Patient not taking: No sig reported) 60 capsule 5    Cinnamon 500 MG CAPS Take four times daily before meals and at bedtime (Patient not taking: No sig reported) 120 capsule 5    guaiFENesin (MUCINEX) 600 MG extended release tablet Take 1 tablet by mouth 2 times daily (Patient not taking: Reported on 3/16/2023) 20 tablet 1    Elastic Bandages & Supports (B & B ELASTIC ANKLE BRACE) MISC Wear brace when up walking during the day (Patient not taking: No sig reported) 1 each 0     No current facility-administered medications for this visit. Allergies   Allergen Reactions    Latex Rash     And itching    Box Elder     Adhesive Tape Rash       Subjective:    Review of Systems   Musculoskeletal:  Positive for arthralgias, myalgias and neck pain. Skin:  Positive for rash (on cheeks). Neurological:  Positive for headaches.      Objective:     Vitals:    03/16/23 0950   BP: 120/60   Pulse: 60   Resp: 16   Weight: (!) 322 lb 6.4 oz (146.2 kg)       Body mass index is 50.49 kg/m². @FREDRICK(7)@  BP Readings from Last 3 Encounters:   03/16/23 120/60   08/16/22 118/84   05/11/22 128/86     Physical Exam  Constitutional:       General: She is not in acute distress. Appearance: Normal appearance. She is well-developed. She is not ill-appearing or diaphoretic. HENT:      Head: Normocephalic and atraumatic. Right Ear: Hearing and external ear normal. No decreased hearing noted. Left Ear: Hearing and external ear normal. No decreased hearing noted. Nose: Nose normal. No nasal deformity. Eyes:      General:         Right eye: No discharge. Left eye: No discharge. Conjunctiva/sclera: Conjunctivae normal.   Pulmonary:      Effort: Pulmonary effort is normal. No respiratory distress. Abdominal:      General: There is no distension. Tenderness: There is no guarding. Musculoskeletal:         General: No tenderness or deformity. Normal range of motion. Cervical back: Normal range of motion and neck supple. Skin:     Coloration: Skin is not pale. Findings: No erythema or rash (On exposed areas). Neurological:      General: No focal deficit present. Mental Status: She is alert and oriented to person, place, and time. Gait: Gait normal.   Psychiatric:         Mood and Affect: Mood normal.         Speech: Speech normal.         Behavior: Behavior normal.         Thought Content:  Thought content normal.         Judgment: Judgment normal.       Lab Results   Component Value Date    WBC 4.5 (L) 12/14/2021    HGB 14.0 12/14/2021    HCT 41.9 12/14/2021     12/14/2021    CHOL 181 01/29/2019    TRIG 161 01/29/2019    HDL 45 01/29/2019    LDLCALC 104 01/29/2019    AST 10 04/06/2022     04/06/2022    K 4.1 04/06/2022     04/06/2022    CREATININE 0.5 04/06/2022    BUN 10 04/06/2022    CO2 19 (L) 04/06/2022    TSH 1.630 04/06/2022    LABA1C 5.4 04/06/2022    LABGLOM >90 04/06/2022    MG 2.0 01/29/2019    CALCIUM 9.2 04/06/2022    VITD25 24 (L) 01/29/2019     Assessment:       Diagnosis Orders   1. Neck pain  XR CERVICAL SPINE (2-3 VIEWS)    CBC with Auto Differential    Comprehensive Metabolic Panel    Hepatic Function Panel    Lipid Panel    TSH with Reflex    Vitamin D 25 Hydroxy      2. Migraine with aura and without status migrainosus, not intractable  topiramate (TOPAMAX) 50 MG tablet    XR CERVICAL SPINE (2-3 VIEWS)    CBC with Auto Differential    Comprehensive Metabolic Panel    Hepatic Function Panel    Lipid Panel    TSH with Reflex    Vitamin D 25 Hydroxy      3. Rosacea  Allergen Inhalant Quinhagak Comp 1    CBC with Auto Differential    Comprehensive Metabolic Panel    Hepatic Function Panel    Lipid Panel    TSH with Reflex    Vitamin D 25 Hydroxy          Plan: Will get some labs    Xray of the neck     Dr. Suzanne Armando - chiropractor - neck pain  Address: [address], [address] [facility]  Phone: [phone]    Change Imitrex to Maxalt   Xray of the neck    Avoid headache triggers:  Lack of food - Aim for 3 health meals daily, with healthy snacks in between  Lack of water - Aim for half of your body weight in ounces daily  Lack of sleep - Aim for 6-8 hours nightly  Certain foods that trigger migraines: Aged cheese, wine, onions, carbohydrates, and chocolate  Please start a headache diary:  Write down when the headache starts, home treatment, and when the headache ends  Write down what he has had to eat and drink that day and the day before  Write down how many hours of sleep he had the night before  Write down how many ounces of water he had that day and the day before. Labs ordered     Return in about 6 months (around 9/16/2023).     Orders Placed:  Orders Placed This Encounter   Procedures    XR CERVICAL SPINE (2-3 VIEWS)    Allergen Inhalant Quin[place] Comp 1    CBC with Auto Differential    Comprehensive Metabolic Panel    Hepatic Function Panel    Lipid Panel    TSH with Reflex    Vitamin D 25 Hydroxy Medications Prescribed:  Orders Placed This Encounter   Medications    rizatriptan (MAXALT) 10 MG tablet     Sig: Take 1 tablet by mouth once as needed for Migraine May repeat in 2 hours if needed     Dispense:  30 tablet     Refill:  1    topiramate (TOPAMAX) 50 MG tablet     Sig: take 1 tablet by mouth at bedtime     Dispense:  90 tablet     Refill:  1     Future Appointments   Date Time Provider Jennifer Palacio   9/18/2023  9:00 AM [address]      Patient given educational materials - see patient instructions. Discussed use, benefit, and side effects of prescribedmedications. All patient questions answered. Pt voiced understanding. Reviewed health maintenance. Instructed to continue current medications, diet and exercise. Patient agreed with treatment plan. Follow up as directed.     Electronically signed by EARL Perez CNP on 3/16/2023 at 11:33 AM

## 2023-03-16 NOTE — PATIENT INSTRUCTIONS
Dr. Cleo Dawson - chiropractor - neck pain  Address: [place] [address], [address]  Phone: [phone]    Change Imitrex to Maxalt   Xray of the neck    Labs ordered

## 2023-03-19 LAB
2000687N OAK TREE IGE: < 0.1 KU/L (ref 0–0.34)
ALLERGEN BERMUDA GRASS IGE: < 0.1 KU/L (ref 0–0.34)
ALLERGEN BIRCH IGE: < 0.1 KU/L (ref 0–0.34)
ALLERGEN DOG DANDER IGE: < 0.1 KU/L (ref 0–0.34)
ALLERGEN GERMAN COCKROACH IGE: < 0.1 KU/L (ref 0–0.34)
ALLERGEN HORMODENDRUM IGE: < 0.1 KUL/L (ref 0–0.34)
ALLERGEN MOUSE EPITHELIA IGE: < 0.1 KU/L (ref 0–0.34)
ALLERGEN PECAN TREE IGE: < 0.1 KU/L (ref 0–0.34)
ALLERGEN PIGWEED ROUGH IGE: < 0.1 KU/L (ref 0–0.34)
ALLERGEN SHEEP SORREL (W18) IGE: < 0.1 KU/L (ref 0–0.34)
ALLERGEN TREE SYCAMORE: < 0.1 KU/L (ref 0–0.34)
ALLERGEN WALNUT TREE IGE: < 0.1 KU/L (ref 0–0.34)
ALLERGEN WHITE MULBERRY TREE, IGE: < 0.1 KU/L (ref 0–0.34)
ALLERGEN, TREE, WHITE ASH IGE: < 0.1 KU/L (ref 0–0.34)
ALTERNARIA ALTERNATA: < 0.1 KU/L (ref 0–0.34)
ASPERGILLUS FUMIGATUS: < 0.1 KU/L (ref 0–0.34)
CAT DANDER ANTIBODY: < 0.1 KU/L (ref 0–0.34)
COTTONWOOD TREE: < 0.1 KU/L (ref 0–0.34)
D. FARINAE: < 0.1 KU/L (ref 0–0.34)
D. PTERONYSSINUS: < 0.1 KU/L (ref 0–0.34)
ELM TREE: < 0.1 KU/L (ref 0–0.34)
IGE SERPL-ACNC: 60 IU/ML
MAPLE/BOXELDER TREE: < 0.1 KU/L (ref 0–0.34)
MOUNTAIN CEDAR TREE: < 0.1 KU/L (ref 0–0.34)
MUCOR RACEMOSUS: < 0.1 KU/L (ref 0–0.34)
P. NOTATUM: < 0.1 KU/L (ref 0–0.34)
RUSSIAN THISTLE: < 0.1 KU/L (ref 0–0.34)
SHORT RAGWD(A ARTEMIS.) IGE: < 0.1 KU/L (ref 0–0.34)
TIMOTHY GRASS: < 0.1 KU/L (ref 0–0.34)

## 2023-05-01 DIAGNOSIS — G43.821 MENSTRUAL MIGRAINE WITH STATUS MIGRAINOSUS, NOT INTRACTABLE: ICD-10-CM

## 2023-05-01 DIAGNOSIS — N92.6 IRREGULAR MENSTRUATION: ICD-10-CM

## 2023-05-03 DIAGNOSIS — F41.9 ANXIETY: ICD-10-CM

## 2023-05-03 DIAGNOSIS — M43.6 TORTICOLLIS: ICD-10-CM

## 2023-05-03 NOTE — TELEPHONE ENCOUNTER
This medication refill is regarding a electronic request. Refill requested by  [facility] . Requested Prescriptions     Pending Prescriptions Disp Refills    levonorgestrel-ethinyl estradiol (NORDETTE) 0.15-30 MG-MCG per tablet [Pharmacy Med Name: Rocio Najera 0.15-0.03] 1 packet 11     Sig: take 1 tablet by mouth once daily     Date of last visit: 3/16/2023   Date of next visit: 9/18/2023  Date of last refill: 5/11/22 #1/11    Rx verified, ordered and set to EP.

## 2023-05-04 RX ORDER — CYCLOBENZAPRINE HCL 10 MG
TABLET ORAL
Qty: 30 TABLET | Refills: 0 | Status: SHIPPED | OUTPATIENT
Start: 2023-05-04

## 2023-05-04 RX ORDER — BUPROPION HYDROCHLORIDE 150 MG/1
150 TABLET ORAL EVERY MORNING
Qty: 30 TABLET | Refills: 1 | Status: SHIPPED | OUTPATIENT
Start: 2023-05-04

## 2023-05-04 RX ORDER — LEVONORGESTREL AND ETHINYL ESTRADIOL 0.15-0.03
KIT ORAL
Qty: 1 PACKET | Refills: 11 | Status: SHIPPED | OUTPATIENT
Start: 2023-05-04

## 2023-05-04 NOTE — TELEPHONE ENCOUNTER
Patient's last appointment was : 3/16/23  Patient's next appointment is :  9/18/23 and 3/7/23  Last refilled: 4/11/23 and   Pharmacy Verified    Lab Results   Component Value Date    LABA1C 5.4 04/06/2022     Lab Results   Component Value Date    CHOL 215 (H) 03/16/2023    TRIG 94 03/16/2023    HDL 45 03/16/2023    LDLCALC 151 03/16/2023     Lab Results   Component Value Date     03/16/2023    K 4.7 03/16/2023     03/16/2023    CO2 23 03/16/2023    BUN 8 03/16/2023    CREATININE 0.6 03/16/2023    GLUCOSE 96 03/16/2023    CALCIUM 9.5 03/16/2023    PROT 6.9 03/16/2023    LABALBU 4.3 03/16/2023    BILITOT 0.2 (L) 03/16/2023    ALKPHOS 87 03/16/2023    AST 11 03/16/2023    ALT 15 03/16/2023    LABGLOM >60 03/16/2023     Lab Results   Component Value Date    TSH 1.680 03/16/2023     Lab Results   Component Value Date    WBC 7.7 03/16/2023    HGB 14.9 03/16/2023    HCT 44.9 03/16/2023    MCV 89.4 03/16/2023     03/16/2023

## 2023-06-07 DIAGNOSIS — F41.9 ANXIETY: ICD-10-CM

## 2023-06-07 RX ORDER — BUSPIRONE HYDROCHLORIDE 5 MG/1
TABLET ORAL
Qty: 90 TABLET | Refills: 5 | Status: SHIPPED | OUTPATIENT
Start: 2023-06-07

## 2023-06-07 NOTE — TELEPHONE ENCOUNTER
Patient's last appointment was : 03/16/2023  Patient's next appointment is :  09/18/2023  Last refilled: 12/07/2022  Yes Pharmacy Verified    Lab Results   Component Value Date    LABA1C 5.4 04/06/2022     Lab Results   Component Value Date    CHOL 215 (H) 03/16/2023    TRIG 94 03/16/2023    HDL 45 03/16/2023    LDLCALC 151 03/16/2023     Lab Results   Component Value Date     03/16/2023    K 4.7 03/16/2023     03/16/2023    CO2 23 03/16/2023    BUN 8 03/16/2023    CREATININE 0.6 03/16/2023    GLUCOSE 96 03/16/2023    CALCIUM 9.5 03/16/2023    PROT 6.9 03/16/2023    LABALBU 4.3 03/16/2023    BILITOT 0.2 (L) 03/16/2023    ALKPHOS 87 03/16/2023    AST 11 03/16/2023    ALT 15 03/16/2023    LABGLOM >60 03/16/2023     Lab Results   Component Value Date    TSH 1.680 03/16/2023     Lab Results   Component Value Date    WBC 7.7 03/16/2023    HGB 14.9 03/16/2023    HCT 44.9 03/16/2023    MCV 89.4 03/16/2023     03/16/2023

## 2023-07-05 DIAGNOSIS — F41.9 ANXIETY: ICD-10-CM

## 2023-07-06 RX ORDER — BUPROPION HYDROCHLORIDE 150 MG/1
150 TABLET ORAL EVERY MORNING
Qty: 30 TABLET | Refills: 1 | Status: SHIPPED | OUTPATIENT
Start: 2023-07-06

## 2023-07-27 DIAGNOSIS — N30.90 CYSTITIS: Primary | ICD-10-CM

## 2023-07-27 RX ORDER — ACETAMINOPHEN 500 MG
TABLET ORAL
Qty: 30 CAPSULE | Refills: 5 | Status: SHIPPED | OUTPATIENT
Start: 2023-07-27

## 2023-07-27 RX ORDER — CIPROFLOXACIN 500 MG/1
500 TABLET, FILM COATED ORAL 2 TIMES DAILY
Qty: 10 TABLET | Refills: 0 | Status: SHIPPED | OUTPATIENT
Start: 2023-07-27 | End: 2023-08-01

## 2023-08-09 NOTE — PROGRESS NOTES
From: Ban Guzman  To: Dr. Chris Johnson  Sent: 8/1/2023 11:54 PM EDT  Subject: LIPOMA    Good morning, Dr. Aditi Duran:    Do I need a follow-up appointment for the lipoma? What is your recommendation(s) for the test results? Thanks. Ban Guzman Health Maintenance Due   Topic Date Due    Varicella Vaccine (1 of 2 - 13+ 2-dose series) refused    DTaP/Tdap/Td vaccine (1 - Tdap) refused    Cervical cancer screen  Will schedule

## 2023-09-03 DIAGNOSIS — G43.109 MIGRAINE WITH AURA AND WITHOUT STATUS MIGRAINOSUS, NOT INTRACTABLE: ICD-10-CM

## 2023-09-05 RX ORDER — TOPIRAMATE 50 MG/1
TABLET, FILM COATED ORAL
Qty: 90 TABLET | Refills: 1 | Status: SHIPPED | OUTPATIENT
Start: 2023-09-05

## 2023-09-05 NOTE — TELEPHONE ENCOUNTER
Last visit- 3/16/2023  Next visit- 9/18/2023    Requested Prescriptions     Pending Prescriptions Disp Refills    topiramate (TOPAMAX) 50 MG tablet [Pharmacy Med Name: TOPIRAMATE 50 MG TABLET] 90 tablet 1     Sig: take 1 tablet by mouth at bedtime
75.2

## 2023-09-11 ASSESSMENT — PATIENT HEALTH QUESTIONNAIRE - PHQ9
10. IF YOU CHECKED OFF ANY PROBLEMS, HOW DIFFICULT HAVE THESE PROBLEMS MADE IT FOR YOU TO DO YOUR WORK, TAKE CARE OF THINGS AT HOME, OR GET ALONG WITH OTHER PEOPLE: NOT DIFFICULT AT ALL
4. FEELING TIRED OR HAVING LITTLE ENERGY: MORE THAN HALF THE DAYS
5. POOR APPETITE OR OVEREATING: SEVERAL DAYS
SUM OF ALL RESPONSES TO PHQ QUESTIONS 1-9: 5
8. MOVING OR SPEAKING SO SLOWLY THAT OTHER PEOPLE COULD HAVE NOTICED. OR THE OPPOSITE, BEING SO FIGETY OR RESTLESS THAT YOU HAVE BEEN MOVING AROUND A LOT MORE THAN USUAL: 0
6. FEELING BAD ABOUT YOURSELF - OR THAT YOU ARE A FAILURE OR HAVE LET YOURSELF OR YOUR FAMILY DOWN: 1
6. FEELING BAD ABOUT YOURSELF - OR THAT YOU ARE A FAILURE OR HAVE LET YOURSELF OR YOUR FAMILY DOWN: SEVERAL DAYS
9. THOUGHTS THAT YOU WOULD BE BETTER OFF DEAD, OR OF HURTING YOURSELF: 0
8. MOVING OR SPEAKING SO SLOWLY THAT OTHER PEOPLE COULD HAVE NOTICED. OR THE OPPOSITE - BEING SO FIDGETY OR RESTLESS THAT YOU HAVE BEEN MOVING AROUND A LOT MORE THAN USUAL: NOT AT ALL
5. POOR APPETITE OR OVEREATING: 1
1. LITTLE INTEREST OR PLEASURE IN DOING THINGS: 0
3. TROUBLE FALLING OR STAYING ASLEEP: 0
9. THOUGHTS THAT YOU WOULD BE BETTER OFF DEAD, OR OF HURTING YOURSELF: NOT AT ALL
2. FEELING DOWN, DEPRESSED OR HOPELESS: 1
SUM OF ALL RESPONSES TO PHQ QUESTIONS 1-9: 5
10. IF YOU CHECKED OFF ANY PROBLEMS, HOW DIFFICULT HAVE THESE PROBLEMS MADE IT FOR YOU TO DO YOUR WORK, TAKE CARE OF THINGS AT HOME, OR GET ALONG WITH OTHER PEOPLE: 0
1. LITTLE INTEREST OR PLEASURE IN DOING THINGS: NOT AT ALL
7. TROUBLE CONCENTRATING ON THINGS, SUCH AS READING THE NEWSPAPER OR WATCHING TELEVISION: 0
7. TROUBLE CONCENTRATING ON THINGS, SUCH AS READING THE NEWSPAPER OR WATCHING TELEVISION: NOT AT ALL
SUM OF ALL RESPONSES TO PHQ QUESTIONS 1-9: 5
2. FEELING DOWN, DEPRESSED OR HOPELESS: SEVERAL DAYS
SUM OF ALL RESPONSES TO PHQ QUESTIONS 1-9: 5
3. TROUBLE FALLING OR STAYING ASLEEP: NOT AT ALL
SUM OF ALL RESPONSES TO PHQ QUESTIONS 1-9: 5
4. FEELING TIRED OR HAVING LITTLE ENERGY: 2
SUM OF ALL RESPONSES TO PHQ9 QUESTIONS 1 & 2: 1

## 2023-09-18 ENCOUNTER — OFFICE VISIT (OUTPATIENT)
Dept: FAMILY MEDICINE CLINIC | Age: 33
End: 2023-09-18
Payer: COMMERCIAL

## 2023-09-18 VITALS
BODY MASS INDEX: 45.99 KG/M2 | SYSTOLIC BLOOD PRESSURE: 124 MMHG | HEIGHT: 67 IN | DIASTOLIC BLOOD PRESSURE: 78 MMHG | RESPIRATION RATE: 16 BRPM | HEART RATE: 80 BPM | WEIGHT: 293 LBS

## 2023-09-18 DIAGNOSIS — M43.6 TORTICOLLIS: ICD-10-CM

## 2023-09-18 DIAGNOSIS — F41.9 ANXIETY: ICD-10-CM

## 2023-09-18 DIAGNOSIS — Z00.00 ENCOUNTER FOR WELL ADULT EXAM WITHOUT ABNORMAL FINDINGS: ICD-10-CM

## 2023-09-18 DIAGNOSIS — R39.15 URINARY URGENCY: Primary | ICD-10-CM

## 2023-09-18 PROCEDURE — 99395 PREV VISIT EST AGE 18-39: CPT | Performed by: NURSE PRACTITIONER

## 2023-09-18 PROCEDURE — 81003 URINALYSIS AUTO W/O SCOPE: CPT | Performed by: NURSE PRACTITIONER

## 2023-09-18 RX ORDER — SAW/VIT E/SOD SEL/LYC/BETA/PYG 160-100
1 TABLET ORAL DAILY
COMMUNITY
Start: 2023-08-30

## 2023-09-18 RX ORDER — OXYBUTYNIN CHLORIDE 10 MG/1
10 TABLET, EXTENDED RELEASE ORAL DAILY
Qty: 30 TABLET | Refills: 3 | Status: SHIPPED | OUTPATIENT
Start: 2023-09-18

## 2023-09-18 RX ORDER — CYCLOBENZAPRINE HCL 10 MG
TABLET ORAL
Qty: 30 TABLET | Refills: 0 | Status: SHIPPED | OUTPATIENT
Start: 2023-09-18

## 2023-09-18 RX ORDER — BUPROPION HYDROCHLORIDE 150 MG/1
150 TABLET ORAL EVERY MORNING
Qty: 30 TABLET | Refills: 5 | Status: SHIPPED | OUTPATIENT
Start: 2023-09-18

## 2023-10-03 ENCOUNTER — PATIENT MESSAGE (OUTPATIENT)
Dept: FAMILY MEDICINE CLINIC | Age: 33
End: 2023-10-03

## 2023-10-03 DIAGNOSIS — R35.89 POLYURIA: ICD-10-CM

## 2023-10-03 DIAGNOSIS — R39.15 URINARY URGENCY: Primary | ICD-10-CM

## 2023-10-03 RX ORDER — SULFAMETHOXAZOLE AND TRIMETHOPRIM 400; 80 MG/1; MG/1
TABLET ORAL
Qty: 30 TABLET | Refills: 0 | Status: SHIPPED | OUTPATIENT
Start: 2023-10-03

## 2024-01-14 DIAGNOSIS — R39.15 URINARY URGENCY: ICD-10-CM

## 2024-01-15 RX ORDER — OXYBUTYNIN CHLORIDE 10 MG/1
10 TABLET, EXTENDED RELEASE ORAL DAILY
Qty: 30 TABLET | Refills: 11 | Status: SHIPPED | OUTPATIENT
Start: 2024-01-15

## 2024-01-15 NOTE — TELEPHONE ENCOUNTER
Request sent from [facility] pharmacy for refill of oxybutynin 10 mg qd.  Last seen 9/18/23, next appt 3/18/24.  Med verified.  Order pended.

## 2024-02-04 DIAGNOSIS — G43.821 MENSTRUAL MIGRAINE WITH STATUS MIGRAINOSUS, NOT INTRACTABLE: ICD-10-CM

## 2024-02-04 DIAGNOSIS — N92.6 IRREGULAR MENSTRUATION: ICD-10-CM

## 2024-02-05 RX ORDER — LEVONORGESTREL AND ETHINYL ESTRADIOL 0.15-0.03
KIT ORAL
Qty: 28 TABLET | Refills: 5 | Status: SHIPPED | OUTPATIENT
Start: 2024-02-05

## 2024-02-05 NOTE — TELEPHONE ENCOUNTER
This medication refill is regarding a electronic request. Refill requested by  [facility] .    Requested Prescriptions     Pending Prescriptions Disp Refills    levonorgestrel-ethinyl estradiol (NORDETTE) 0.15-30 MG-MCG per tablet [Pharmacy Med Name: LEVONOR-ETH ESTRAD 0.15-0.03] 28 tablet      Sig: take 1 tablet by mouth once daily       Date of last visit: 5/11/2022   Date of next visit: 3/18/2024  Date of last refill: 5/4/2023 1/11    Last Lipid Panel:    Lab Results   Component Value Date/Time    CHOL 215 03/16/2023 11:39 AM    TRIG 94 03/16/2023 11:39 AM    HDL 45 03/16/2023 11:39 AM    LDLCALC 151 03/16/2023 11:39 AM     Last CMP:   Lab Results   Component Value Date     03/16/2023    K 4.7 03/16/2023     03/16/2023    CO2 23 03/16/2023    BUN 8 03/16/2023    CREATININE 0.6 03/16/2023    GLUCOSE 96 03/16/2023    CALCIUM 9.5 03/16/2023    PROT 6.9 03/16/2023    LABALBU 4.3 03/16/2023    BILITOT 0.2 (L) 03/16/2023    ALKPHOS 87 03/16/2023    AST 11 03/16/2023    ALT 15 03/16/2023    LABGLOM >60 03/16/2023       Last Thyroid:    Lab Results   Component Value Date    TSH 1.680 03/16/2023     Last Hemoglobin A1C:    Lab Results   Component Value Date/Time    LABA1C 5.4 04/06/2022 09:37 AM       Rx verified, ordered and set to EP.

## 2024-02-08 DIAGNOSIS — M43.6 TORTICOLLIS: ICD-10-CM

## 2024-02-09 RX ORDER — CYCLOBENZAPRINE HCL 10 MG
TABLET ORAL
Qty: 30 TABLET | Refills: 0 | Status: SHIPPED | OUTPATIENT
Start: 2024-02-09

## 2024-02-09 NOTE — TELEPHONE ENCOUNTER
Request sent from [facility] for refill of flexeril 10 mg qd prn.   Last seen 9/18/23, next appt 3/18/24.  Rx last written 12/22/23, #30/NR.  Med verified.  Order pended.

## 2024-03-14 DIAGNOSIS — G43.109 MIGRAINE WITH AURA AND WITHOUT STATUS MIGRAINOSUS, NOT INTRACTABLE: ICD-10-CM

## 2024-03-14 RX ORDER — TOPIRAMATE 50 MG/1
TABLET, FILM COATED ORAL
Qty: 90 TABLET | Refills: 1 | Status: SHIPPED | OUTPATIENT
Start: 2024-03-14

## 2024-03-14 NOTE — TELEPHONE ENCOUNTER
This medication refill is regarding a electronic request. Refill requested by  pharmacy .    Requested Prescriptions     Pending Prescriptions Disp Refills    topiramate (TOPAMAX) 50 MG tablet [Pharmacy Med Name: TOPIRAMATE 50 MG TABLET] 90 tablet 1     Sig: take 1 tablet by mouth at bedtime       Date of last visit: 9/18/2023   Date of next visit: 3/18/2024  Date of last refill: 9/5/23  Pharmacy Name:     Last Lipid Panel:    Lab Results   Component Value Date/Time    CHOL 215 03/16/2023 11:39 AM    TRIG 94 03/16/2023 11:39 AM    HDL 45 03/16/2023 11:39 AM    LDLCALC 151 03/16/2023 11:39 AM     Last CMP:   Lab Results   Component Value Date     03/16/2023    K 4.7 03/16/2023     03/16/2023    CO2 23 03/16/2023    BUN 8 03/16/2023    CREATININE 0.6 03/16/2023    GLUCOSE 96 03/16/2023    CALCIUM 9.5 03/16/2023    PROT 6.9 03/16/2023    LABALBU 4.3 03/16/2023    BILITOT 0.2 (L) 03/16/2023    ALKPHOS 87 03/16/2023    AST 11 03/16/2023    ALT 15 03/16/2023    LABGLOM >60 03/16/2023       Last Thyroid:    Lab Results   Component Value Date    TSH 1.680 03/16/2023     Last Hemoglobin A1C:    Lab Results   Component Value Date/Time    LABA1C 5.4 04/06/2022 09:37 AM       Rx verified, ordered and set to EP.

## 2024-03-18 ENCOUNTER — OFFICE VISIT (OUTPATIENT)
Dept: FAMILY MEDICINE CLINIC | Age: 34
End: 2024-03-18
Payer: COMMERCIAL

## 2024-03-18 VITALS
SYSTOLIC BLOOD PRESSURE: 110 MMHG | BODY MASS INDEX: 48.77 KG/M2 | RESPIRATION RATE: 18 BRPM | DIASTOLIC BLOOD PRESSURE: 78 MMHG | HEART RATE: 76 BPM | WEIGHT: 293 LBS

## 2024-03-18 DIAGNOSIS — M43.6 TORTICOLLIS: ICD-10-CM

## 2024-03-18 DIAGNOSIS — R39.15 URINARY URGENCY: Primary | ICD-10-CM

## 2024-03-18 DIAGNOSIS — E78.00 HYPERCHOLESTEREMIA: ICD-10-CM

## 2024-03-18 DIAGNOSIS — G43.109 MIGRAINE WITH AURA AND WITHOUT STATUS MIGRAINOSUS, NOT INTRACTABLE: ICD-10-CM

## 2024-03-18 DIAGNOSIS — N39.3 STRESS INCONTINENCE OF URINE: ICD-10-CM

## 2024-03-18 PROCEDURE — 99213 OFFICE O/P EST LOW 20 MIN: CPT | Performed by: NURSE PRACTITIONER

## 2024-03-18 RX ORDER — RIZATRIPTAN BENZOATE 10 MG/1
10 TABLET ORAL
Qty: 9 TABLET | Refills: 5 | Status: SHIPPED | OUTPATIENT
Start: 2024-03-18 | End: 2024-03-18

## 2024-03-18 RX ORDER — CYCLOBENZAPRINE HCL 10 MG
TABLET ORAL
Qty: 30 TABLET | Refills: 1 | Status: SHIPPED | OUTPATIENT
Start: 2024-03-18

## 2024-03-18 RX ORDER — RIZATRIPTAN BENZOATE 10 MG/1
TABLET ORAL
Qty: 30 TABLET | Refills: 1 | OUTPATIENT
Start: 2024-03-18

## 2024-03-18 RX ORDER — CYCLOBENZAPRINE HCL 10 MG
TABLET ORAL
Qty: 30 TABLET | Refills: 0 | OUTPATIENT
Start: 2024-03-18

## 2024-03-18 SDOH — ECONOMIC STABILITY: FOOD INSECURITY: WITHIN THE PAST 12 MONTHS, THE FOOD YOU BOUGHT JUST DIDN'T LAST AND YOU DIDN'T HAVE MONEY TO GET MORE.: NEVER TRUE

## 2024-03-18 SDOH — ECONOMIC STABILITY: INCOME INSECURITY: HOW HARD IS IT FOR YOU TO PAY FOR THE VERY BASICS LIKE FOOD, HOUSING, MEDICAL CARE, AND HEATING?: NOT HARD AT ALL

## 2024-03-18 SDOH — ECONOMIC STABILITY: FOOD INSECURITY: WITHIN THE PAST 12 MONTHS, YOU WORRIED THAT YOUR FOOD WOULD RUN OUT BEFORE YOU GOT MONEY TO BUY MORE.: NEVER TRUE

## 2024-03-18 ASSESSMENT — ENCOUNTER SYMPTOMS
ANAL BLEEDING: 0
EYE DISCHARGE: 0
COLOR CHANGE: 0
CONSTIPATION: 0
RHINORRHEA: 0
NAUSEA: 0
ABDOMINAL DISTENTION: 0
BLOOD IN STOOL: 0
DIARRHEA: 0
EYE REDNESS: 0
SHORTNESS OF BREATH: 0
SORE THROAT: 0
ABDOMINAL PAIN: 0

## 2024-03-18 ASSESSMENT — PATIENT HEALTH QUESTIONNAIRE - PHQ9
SUM OF ALL RESPONSES TO PHQ QUESTIONS 1-9: 0
1. LITTLE INTEREST OR PLEASURE IN DOING THINGS: NOT AT ALL
SUM OF ALL RESPONSES TO PHQ QUESTIONS 1-9: 0
2. FEELING DOWN, DEPRESSED OR HOPELESS: NOT AT ALL
SUM OF ALL RESPONSES TO PHQ9 QUESTIONS 1 & 2: 0
SUM OF ALL RESPONSES TO PHQ QUESTIONS 1-9: 0
SUM OF ALL RESPONSES TO PHQ QUESTIONS 1-9: 0

## 2024-03-18 NOTE — TELEPHONE ENCOUNTER
Request sent from [facility] pharmacy for refills of flexeril 10 mg qd prn and maxalt 10 mg prn.  Being seen in office today, no future appt scheduled.  Meds verified.  Orders pended.

## 2024-03-18 NOTE — PROGRESS NOTES
Referral faxed to [facility] physical therapy at [phone] , they will call the pt to schedule   
cyclobenzaprine (FLEXERIL) 10 MG tablet     Sig: take 1 tablet by mouth once daily if needed for muscle spasm     Dispense:  30 tablet     Refill:  1          Discussed use,benefit, and side effects of prescribed medications.  Barriers to medication complianceaddressed.  All patient questions answered.  Pt voiced understanding.     Electronicallysigned by EARL Hansen CNP on 3/18/2024 at 8:52 AM

## 2024-03-19 DIAGNOSIS — F40.01 PANIC DISORDER WITH AGORAPHOBIA: ICD-10-CM

## 2024-03-19 NOTE — TELEPHONE ENCOUNTER
This medication refill is regarding a MyChart request. Refill requested by patient.    Requested Prescriptions     Pending Prescriptions Disp Refills    Vitamins-Lipotropics (BALANCED B-100 COMPLEX CR) TBCR 120 tablet 5     Sig: Take 1 tablet by mouth 4 times daily (after meals and at bedtime)       Date of last visit: 5/11/2022   Date of next visit: 9/23/2024  Date of last refill: 12/14/21  Pharmacy Name:     Last Lipid Panel:    Lab Results   Component Value Date/Time    CHOL 215 03/16/2023 11:39 AM    TRIG 94 03/16/2023 11:39 AM    HDL 45 03/16/2023 11:39 AM    LDLCALC 151 03/16/2023 11:39 AM     Last CMP:   Lab Results   Component Value Date     03/16/2023    K 4.7 03/16/2023     03/16/2023    CO2 23 03/16/2023    BUN 8 03/16/2023    CREATININE 0.6 03/16/2023    GLUCOSE 96 03/16/2023    CALCIUM 9.5 03/16/2023    PROT 6.9 03/16/2023    LABALBU 4.3 03/16/2023    BILITOT 0.2 (L) 03/16/2023    ALKPHOS 87 03/16/2023    AST 11 03/16/2023    ALT 15 03/16/2023    LABGLOM >60 03/16/2023       Last Thyroid:    Lab Results   Component Value Date    TSH 1.680 03/16/2023     Last Hemoglobin A1C:    Lab Results   Component Value Date/Time    LABA1C 5.4 04/06/2022 09:37 AM       Rx verified, ordered and set to EP.

## 2024-05-12 DIAGNOSIS — M43.6 TORTICOLLIS: ICD-10-CM

## 2024-05-13 RX ORDER — CYCLOBENZAPRINE HCL 10 MG
TABLET ORAL
Qty: 30 TABLET | Refills: 1 | Status: SHIPPED | OUTPATIENT
Start: 2024-05-13

## 2024-05-13 NOTE — TELEPHONE ENCOUNTER
This medication refill is regarding a electronic request. Refill requested by patient.    Requested Prescriptions     Pending Prescriptions Disp Refills    cyclobenzaprine (FLEXERIL) 10 MG tablet [Pharmacy Med Name: CYCLOBENZAPRINE 10 MG TABLET] 30 tablet 1     Sig: take 1 tablet by mouth once daily if needed for muscle spasm       Date of last visit: 3/18/2024   Date of next visit: 9/23/2024  Date of last refill: 3-18-24 #30/1     Rx verified, ordered and set to EP.

## 2024-06-03 DIAGNOSIS — G43.821 MENSTRUAL MIGRAINE WITH STATUS MIGRAINOSUS, NOT INTRACTABLE: ICD-10-CM

## 2024-06-03 DIAGNOSIS — N92.6 IRREGULAR MENSTRUATION: ICD-10-CM

## 2024-06-04 RX ORDER — LEVONORGESTREL AND ETHINYL ESTRADIOL 0.15-0.03
KIT ORAL
Qty: 28 TABLET | Refills: 5 | Status: SHIPPED | OUTPATIENT
Start: 2024-06-04

## 2024-06-04 NOTE — TELEPHONE ENCOUNTER
This medication refill is regarding a electronic request. Refill requested by patient.    Requested Prescriptions     Pending Prescriptions Disp Refills    levonorgestrel-ethinyl estradiol (NORDETTE) 0.15-30 MG-MCG per tablet [Pharmacy Med Name: LEVONOR-ETH ESTRAD 0.15-0.03] 28 tablet 5     Sig: take 1 tablet by mouth once daily       Date of last visit: 5/11/2022   Date of next visit: 9/23/2024  Date of last refill: 2-5-24 #28/5     Rx verified, ordered and set to EP.

## 2024-06-10 RX ORDER — SAW/VIT E/SOD SEL/LYC/BETA/PYG 160-100
1 TABLET ORAL DAILY
Qty: 30 CAPSULE | Refills: 11 | Status: SHIPPED | OUTPATIENT
Start: 2024-06-10

## 2024-06-10 NOTE — TELEPHONE ENCOUNTER
Request sent from [facility] pharmacy for refill of probiotic qd.  Last seen 3/18/24, next appt 9/23/24.  Med verified.  Order pended.

## 2024-09-16 ENCOUNTER — LAB (OUTPATIENT)
Dept: LAB | Age: 34
End: 2024-09-16

## 2024-09-16 DIAGNOSIS — R39.15 URINARY URGENCY: ICD-10-CM

## 2024-09-16 DIAGNOSIS — G43.109 MIGRAINE WITH AURA AND WITHOUT STATUS MIGRAINOSUS, NOT INTRACTABLE: ICD-10-CM

## 2024-09-16 DIAGNOSIS — E78.00 HYPERCHOLESTEREMIA: ICD-10-CM

## 2024-09-16 LAB
25(OH)D3 SERPL-MCNC: 36 NG/ML (ref 30–100)
ALBUMIN SERPL BCG-MCNC: 4.1 G/DL (ref 3.5–5.1)
ALP SERPL-CCNC: 102 U/L (ref 38–126)
ALT SERPL W/O P-5'-P-CCNC: 23 U/L (ref 11–66)
ANION GAP SERPL CALC-SCNC: 14 MEQ/L (ref 8–16)
AST SERPL-CCNC: 16 U/L (ref 5–40)
BASOPHILS ABSOLUTE: 0 THOU/MM3 (ref 0–0.1)
BASOPHILS NFR BLD AUTO: 0.1 %
BILIRUB SERPL-MCNC: 0.3 MG/DL (ref 0.3–1.2)
BUN SERPL-MCNC: 10 MG/DL (ref 7–22)
CALCIUM SERPL-MCNC: 9.1 MG/DL (ref 8.5–10.5)
CHLORIDE SERPL-SCNC: 106 MEQ/L (ref 98–111)
CHOLEST SERPL-MCNC: 192 MG/DL (ref 100–199)
CO2 SERPL-SCNC: 22 MEQ/L (ref 23–33)
CREAT SERPL-MCNC: 0.6 MG/DL (ref 0.4–1.2)
DEPRECATED MEAN GLUCOSE BLD GHB EST-ACNC: 105 MG/DL (ref 70–126)
DEPRECATED RDW RBC AUTO: 43.7 FL (ref 35–45)
EOSINOPHIL NFR BLD AUTO: 1.5 %
EOSINOPHILS ABSOLUTE: 0.1 THOU/MM3 (ref 0–0.4)
ERYTHROCYTE [DISTWIDTH] IN BLOOD BY AUTOMATED COUNT: 13 % (ref 11.5–14.5)
GFR SERPL CREATININE-BSD FRML MDRD: > 90 ML/MIN/1.73M2
GLUCOSE SERPL-MCNC: 97 MG/DL (ref 70–108)
HBA1C MFR BLD HPLC: 5.5 % (ref 4.4–6.4)
HCT VFR BLD AUTO: 44.3 % (ref 37–47)
HDLC SERPL-MCNC: 42 MG/DL
HGB BLD-MCNC: 14.5 GM/DL (ref 12–16)
IMM GRANULOCYTES # BLD AUTO: 0.03 THOU/MM3 (ref 0–0.07)
IMM GRANULOCYTES NFR BLD AUTO: 0.4 %
LDLC SERPL CALC-MCNC: 121 MG/DL
LYMPHOCYTES ABSOLUTE: 2.1 THOU/MM3 (ref 1–4.8)
LYMPHOCYTES NFR BLD AUTO: 26.4 %
MCH RBC QN AUTO: 30 PG (ref 26–33)
MCHC RBC AUTO-ENTMCNC: 32.7 GM/DL (ref 32.2–35.5)
MCV RBC AUTO: 91.7 FL (ref 81–99)
MONOCYTES ABSOLUTE: 0.5 THOU/MM3 (ref 0.4–1.3)
MONOCYTES NFR BLD AUTO: 5.9 %
NEUTROPHILS ABSOLUTE: 5.3 THOU/MM3 (ref 1.8–7.7)
NEUTROPHILS NFR BLD AUTO: 65.7 %
NRBC BLD AUTO-RTO: 0 /100 WBC
PLATELET # BLD AUTO: 281 THOU/MM3 (ref 130–400)
PMV BLD AUTO: 10.3 FL (ref 9.4–12.4)
POTASSIUM SERPL-SCNC: 3.8 MEQ/L (ref 3.5–5.2)
PROT SERPL-MCNC: 7 G/DL (ref 6.1–8)
RBC # BLD AUTO: 4.83 MILL/MM3 (ref 4.2–5.4)
SODIUM SERPL-SCNC: 142 MEQ/L (ref 135–145)
TRIGL SERPL-MCNC: 146 MG/DL (ref 0–199)
TSH SERPL DL<=0.005 MIU/L-ACNC: 2.88 UIU/ML (ref 0.4–4.2)
WBC # BLD AUTO: 8.1 THOU/MM3 (ref 4.8–10.8)

## 2024-09-23 ENCOUNTER — HOSPITAL ENCOUNTER (OUTPATIENT)
Dept: GENERAL RADIOLOGY | Age: 34
Discharge: HOME | End: 2024-09-23
Payer: COMMERCIAL

## 2024-09-23 ENCOUNTER — TELEPHONE (OUTPATIENT)
Dept: FAMILY MEDICINE CLINIC | Age: 34
End: 2024-09-23

## 2024-09-23 ENCOUNTER — OFFICE VISIT (OUTPATIENT)
Dept: FAMILY MEDICINE CLINIC | Age: 34
End: 2024-09-23
Payer: COMMERCIAL

## 2024-09-23 ENCOUNTER — HOSPITAL ENCOUNTER (OUTPATIENT)
Age: 34
Discharge: HOME | End: 2024-09-23
Payer: COMMERCIAL

## 2024-09-23 VITALS
WEIGHT: 288 LBS | HEIGHT: 67 IN | BODY MASS INDEX: 45.2 KG/M2 | RESPIRATION RATE: 16 BRPM | HEART RATE: 72 BPM | TEMPERATURE: 98.4 F | DIASTOLIC BLOOD PRESSURE: 86 MMHG | SYSTOLIC BLOOD PRESSURE: 128 MMHG

## 2024-09-23 DIAGNOSIS — Z12.4 SCREENING FOR CERVICAL CANCER: ICD-10-CM

## 2024-09-23 DIAGNOSIS — G43.109 MIGRAINE WITH AURA AND WITHOUT STATUS MIGRAINOSUS, NOT INTRACTABLE: ICD-10-CM

## 2024-09-23 DIAGNOSIS — Z01.419 WELL FEMALE EXAM WITH ROUTINE GYNECOLOGICAL EXAM: Primary | ICD-10-CM

## 2024-09-23 DIAGNOSIS — E66.01 OBESITY, CLASS III, BMI 40-49.9 (MORBID OBESITY): ICD-10-CM

## 2024-09-23 DIAGNOSIS — J30.89 NON-SEASONAL ALLERGIC RHINITIS, UNSPECIFIED TRIGGER: ICD-10-CM

## 2024-09-23 DIAGNOSIS — U07.1 COVID-19: ICD-10-CM

## 2024-09-23 DIAGNOSIS — Z11.59 NEED FOR HEPATITIS C SCREENING TEST: ICD-10-CM

## 2024-09-23 DIAGNOSIS — N92.6 IRREGULAR MENSTRUATION: ICD-10-CM

## 2024-09-23 DIAGNOSIS — G89.29 CHRONIC PAIN OF LEFT KNEE: ICD-10-CM

## 2024-09-23 DIAGNOSIS — L71.9 ROSACEA: ICD-10-CM

## 2024-09-23 DIAGNOSIS — M43.6 TORTICOLLIS: ICD-10-CM

## 2024-09-23 DIAGNOSIS — M25.562 CHRONIC PAIN OF LEFT KNEE: ICD-10-CM

## 2024-09-23 DIAGNOSIS — G43.821 MENSTRUAL MIGRAINE WITH STATUS MIGRAINOSUS, NOT INTRACTABLE: ICD-10-CM

## 2024-09-23 DIAGNOSIS — E55.9 VITAMIN D DEFICIENCY: ICD-10-CM

## 2024-09-23 DIAGNOSIS — J01.01 ACUTE RECURRENT MAXILLARY SINUSITIS: ICD-10-CM

## 2024-09-23 PROCEDURE — 73562 X-RAY EXAM OF KNEE 3: CPT

## 2024-09-23 PROCEDURE — 99395 PREV VISIT EST AGE 18-39: CPT | Performed by: NURSE PRACTITIONER

## 2024-09-23 RX ORDER — FLUTICASONE PROPIONATE 50 MCG
SPRAY, SUSPENSION (ML) NASAL
Qty: 1 EACH | Refills: 3 | Status: SHIPPED | OUTPATIENT
Start: 2024-09-23

## 2024-09-23 RX ORDER — LORATADINE 10 MG/1
20 TABLET ORAL DAILY
Qty: 60 TABLET | Refills: 5 | Status: SHIPPED | OUTPATIENT
Start: 2024-09-23

## 2024-09-23 RX ORDER — MONTELUKAST SODIUM 10 MG/1
TABLET ORAL
Qty: 180 TABLET | Refills: 1 | Status: SHIPPED | OUTPATIENT
Start: 2024-09-23

## 2024-09-23 RX ORDER — BUSPIRONE HYDROCHLORIDE 5 MG/1
5 TABLET ORAL 3 TIMES DAILY
COMMUNITY
End: 2024-09-25 | Stop reason: SDUPTHER

## 2024-09-23 RX ORDER — CYCLOBENZAPRINE HCL 10 MG
TABLET ORAL
Qty: 30 TABLET | Refills: 1 | Status: SHIPPED | OUTPATIENT
Start: 2024-09-23

## 2024-09-23 RX ORDER — AZELAIC ACID 0.15 G/G
GEL TOPICAL
Qty: 50 G | Refills: 0 | Status: SHIPPED | OUTPATIENT
Start: 2024-09-23

## 2024-09-23 RX ORDER — LEVONORGESTREL AND ETHINYL ESTRADIOL 0.15-0.03
KIT ORAL
Qty: 28 TABLET | Refills: 5 | Status: SHIPPED | OUTPATIENT
Start: 2024-09-23

## 2024-09-23 RX ORDER — SAW/VIT E/SOD SEL/LYC/BETA/PYG 160-100
1 TABLET ORAL DAILY
Qty: 30 CAPSULE | Refills: 11 | Status: SHIPPED | OUTPATIENT
Start: 2024-09-23

## 2024-09-23 RX ORDER — OMEGA-3 FATTY ACIDS/FISH OIL 300-500 MG
CAPSULE ORAL
Qty: 90 CAPSULE | Refills: 5 | Status: SHIPPED | OUTPATIENT
Start: 2024-09-23

## 2024-09-23 RX ORDER — NEBULIZER ACCESSORIES
1 KIT MISCELLANEOUS DAILY PRN
Qty: 1 KIT | Refills: 0 | Status: SHIPPED | OUTPATIENT
Start: 2024-09-23

## 2024-09-23 RX ORDER — TOPIRAMATE 50 MG/1
TABLET, FILM COATED ORAL
Qty: 90 TABLET | Refills: 1 | Status: SHIPPED | OUTPATIENT
Start: 2024-09-23

## 2024-09-23 RX ORDER — ACETAMINOPHEN 500 MG
TABLET ORAL
Qty: 90 CAPSULE | Refills: 3 | Status: SHIPPED | OUTPATIENT
Start: 2024-09-23

## 2024-09-23 ASSESSMENT — ENCOUNTER SYMPTOMS
SORE THROAT: 0
RHINORRHEA: 0
ABDOMINAL PAIN: 0
COLOR CHANGE: 0
CONSTIPATION: 0
ABDOMINAL DISTENTION: 0
BLOOD IN STOOL: 0
EYE DISCHARGE: 0
NAUSEA: 0
EYE REDNESS: 0
SHORTNESS OF BREATH: 0
COUGH: 0
ANAL BLEEDING: 0
DIARRHEA: 0

## 2024-09-25 RX ORDER — BUSPIRONE HYDROCHLORIDE 5 MG/1
5 TABLET ORAL 3 TIMES DAILY
Qty: 90 TABLET | Refills: 5 | Status: SHIPPED | OUTPATIENT
Start: 2024-09-25

## 2024-10-01 LAB — CYTOLOGY THIN PREP PAP: NORMAL

## 2024-11-25 DIAGNOSIS — L71.9 ROSACEA: ICD-10-CM

## 2024-11-25 RX ORDER — AZELAIC ACID 0.15 G/G
GEL TOPICAL
Qty: 50 G | Refills: 0 | Status: SHIPPED | OUTPATIENT
Start: 2024-11-25

## 2024-12-16 RX ORDER — VITAMIN B COMPLEX/FOLIC ACID 0.4 MG
TABLET ORAL
Qty: 120 TABLET | Refills: 0 | Status: SHIPPED | OUTPATIENT
Start: 2024-12-16

## 2024-12-23 RX ORDER — VITAMIN B COMPLEX/FOLIC ACID 0.4 MG
TABLET ORAL
Qty: 120 TABLET | Refills: 0 | Status: SHIPPED | OUTPATIENT
Start: 2024-12-23

## 2024-12-23 NOTE — TELEPHONE ENCOUNTER
This medication refill is regarding a electronic request. Refill requested by  [facility] .    Requested Prescriptions     Pending Prescriptions Disp Refills    vitamin B complex (NATURES BLEND B COMPLEX) TABS tablet [Pharmacy Med Name: B Complex Formula 1 Oral Tablet] 120 tablet 11     Sig: TAKE 1 TABLET BY MOUTH 4 TIMES DAILY AFTER  MEALS  AND  AT  BEDTIME     Date of last visit: 9/23/2024   Date of next visit: 9/25/2025  Date of last refill: 12/16/24 #120/0    Rx verified, ordered and set to EP.

## 2025-01-10 ENCOUNTER — TELEPHONE (OUTPATIENT)
Dept: FAMILY MEDICINE CLINIC | Age: 35
End: 2025-01-10

## 2025-01-10 DIAGNOSIS — N30.90 CYSTITIS: ICD-10-CM

## 2025-01-10 RX ORDER — CIPROFLOXACIN 500 MG/1
500 TABLET, FILM COATED ORAL 2 TIMES DAILY
Qty: 10 TABLET | Refills: 0 | Status: SHIPPED | OUTPATIENT
Start: 2025-01-10 | End: 2025-01-15

## 2025-01-10 RX ORDER — PHENAZOPYRIDINE HYDROCHLORIDE 200 MG/1
200 TABLET, FILM COATED ORAL 3 TIMES DAILY PRN
Qty: 9 TABLET | Refills: 0 | Status: SHIPPED | OUTPATIENT
Start: 2025-01-10 | End: 2025-01-13

## 2025-01-10 NOTE — TELEPHONE ENCOUNTER
Felipa with pharmacy called regarding Rx for Cipro. It stated 1 tablet 2xday for 10 days quantity of 10 tablets. They need the quantity clarified and for how many days.

## 2025-01-28 ENCOUNTER — TELEPHONE (OUTPATIENT)
Dept: FAMILY MEDICINE CLINIC | Age: 35
End: 2025-01-28

## 2025-01-28 SDOH — HEALTH STABILITY: PHYSICAL HEALTH: ON AVERAGE, HOW MANY DAYS PER WEEK DO YOU ENGAGE IN MODERATE TO STRENUOUS EXERCISE (LIKE A BRISK WALK)?: 0 DAYS

## 2025-01-28 SDOH — HEALTH STABILITY: PHYSICAL HEALTH: ON AVERAGE, HOW MANY MINUTES DO YOU ENGAGE IN EXERCISE AT THIS LEVEL?: 0 MIN

## 2025-01-29 ENCOUNTER — OFFICE VISIT (OUTPATIENT)
Dept: FAMILY MEDICINE CLINIC | Age: 35
End: 2025-01-29
Payer: COMMERCIAL

## 2025-01-29 VITALS
HEIGHT: 68 IN | DIASTOLIC BLOOD PRESSURE: 72 MMHG | SYSTOLIC BLOOD PRESSURE: 128 MMHG | WEIGHT: 285.4 LBS | HEART RATE: 84 BPM | BODY MASS INDEX: 43.26 KG/M2 | OXYGEN SATURATION: 98 % | TEMPERATURE: 97.9 F

## 2025-01-29 DIAGNOSIS — J30.89 NON-SEASONAL ALLERGIC RHINITIS, UNSPECIFIED TRIGGER: ICD-10-CM

## 2025-01-29 DIAGNOSIS — F40.01 PANIC DISORDER WITH AGORAPHOBIA: Primary | ICD-10-CM

## 2025-01-29 DIAGNOSIS — B35.4 TINEA CORPORIS: ICD-10-CM

## 2025-01-29 DIAGNOSIS — M62.838 MUSCLE SPASM: ICD-10-CM

## 2025-01-29 DIAGNOSIS — G43.109 MIGRAINE WITH AURA AND WITHOUT STATUS MIGRAINOSUS, NOT INTRACTABLE: ICD-10-CM

## 2025-01-29 DIAGNOSIS — N63.23 MASS OF LOWER OUTER QUADRANT OF LEFT BREAST: ICD-10-CM

## 2025-01-29 DIAGNOSIS — L71.9 ROSACEA: ICD-10-CM

## 2025-01-29 PROCEDURE — 99204 OFFICE O/P NEW MOD 45 MIN: CPT | Performed by: FAMILY MEDICINE

## 2025-01-29 RX ORDER — BACLOFEN 10 MG/1
10 TABLET ORAL 3 TIMES DAILY
Qty: 30 TABLET | Refills: 2 | Status: SHIPPED | OUTPATIENT
Start: 2025-01-29

## 2025-01-29 RX ORDER — TOPIRAMATE 50 MG/1
50 TABLET, FILM COATED ORAL 2 TIMES DAILY
Qty: 180 TABLET | Refills: 1 | Status: SHIPPED | OUTPATIENT
Start: 2025-01-29

## 2025-01-29 RX ORDER — NYSTATIN AND TRIAMCINOLONE ACETONIDE 100000; 1 [USP'U]/G; MG/G
CREAM TOPICAL
Qty: 60 G | Refills: 1 | Status: SHIPPED | OUTPATIENT
Start: 2025-01-29

## 2025-01-29 RX ORDER — LORATADINE 10 MG/1
10 TABLET ORAL DAILY
Qty: 60 TABLET | Refills: 5 | Status: SHIPPED | OUTPATIENT
Start: 2025-01-29

## 2025-01-29 RX ORDER — BUSPIRONE HYDROCHLORIDE 10 MG/1
10 TABLET ORAL 2 TIMES DAILY
Qty: 60 TABLET | Refills: 2 | Status: SHIPPED | OUTPATIENT
Start: 2025-01-29

## 2025-01-29 SDOH — ECONOMIC STABILITY: FOOD INSECURITY: WITHIN THE PAST 12 MONTHS, THE FOOD YOU BOUGHT JUST DIDN'T LAST AND YOU DIDN'T HAVE MONEY TO GET MORE.: NEVER TRUE

## 2025-01-29 SDOH — ECONOMIC STABILITY: FOOD INSECURITY: WITHIN THE PAST 12 MONTHS, YOU WORRIED THAT YOUR FOOD WOULD RUN OUT BEFORE YOU GOT MONEY TO BUY MORE.: NEVER TRUE

## 2025-01-29 ASSESSMENT — PATIENT HEALTH QUESTIONNAIRE - PHQ9
SUM OF ALL RESPONSES TO PHQ QUESTIONS 1-9: 0
1. LITTLE INTEREST OR PLEASURE IN DOING THINGS: NOT AT ALL
SUM OF ALL RESPONSES TO PHQ QUESTIONS 1-9: 0
2. FEELING DOWN, DEPRESSED OR HOPELESS: NOT AT ALL
SUM OF ALL RESPONSES TO PHQ QUESTIONS 1-9: 0
SUM OF ALL RESPONSES TO PHQ QUESTIONS 1-9: 0
SUM OF ALL RESPONSES TO PHQ9 QUESTIONS 1 & 2: 0

## 2025-01-29 ASSESSMENT — ENCOUNTER SYMPTOMS
WHEEZING: 0
VOMITING: 0
ABDOMINAL PAIN: 0
NAUSEA: 0
DIARRHEA: 0
CONSTIPATION: 0
COUGH: 0
RHINORRHEA: 1
SHORTNESS OF BREATH: 0

## 2025-01-29 NOTE — PROGRESS NOTES
Heather Delaney (:  1990) is a 34 y.o. female,New patient, here for evaluation of the following chief complaint(s):  New Patient, Establish Care, Mass (Left side.), Breast Problem (She thinks it is a yeast infection), and Discuss Medications      Subjective   SUBJECTIVE/OBJECTIVE:  HPI  Patient presents with left breast lump for about 3 weeks, has become more painful in the last 3 days  Has had problems with lumps on her skin in the past, usually in the pubic region but they normally resolve in a few days  Has also had a rash on her R breast near the nipple, no help with hydrocortisone cream  Several questions about her medications---still having some migraines with the Topamax use but seems to be better than when she started, doesn't know if the Flexeril is helping or just making her tired so she can sleep.  Doesn't feel like her buspirone is working, didn't do well with Prozac in the past    Review of Systems   Constitutional:  Positive for fatigue. Negative for activity change, chills, fever and unexpected weight change.   HENT:  Positive for rhinorrhea (chronic).    Respiratory:  Negative for cough, shortness of breath and wheezing.    Cardiovascular:  Negative for chest pain, palpitations and leg swelling.   Gastrointestinal:  Negative for abdominal pain, constipation, diarrhea, nausea and vomiting.   Skin:  Positive for rash.        Lump left breast that is painful now  Vane colored cheeks, just started creams for rosacea   Neurological:  Positive for headaches. Negative for dizziness and light-headedness.          Objective   Physical Exam  Vitals reviewed.   Constitutional:       General: She is not in acute distress.     Appearance: Normal appearance. She is obese. She is not ill-appearing.   Cardiovascular:      Rate and Rhythm: Normal rate and regular rhythm.      Heart sounds: No murmur heard.     No gallop.   Pulmonary:      Effort: Pulmonary effort is normal.      Breath sounds: Normal breath

## 2025-02-28 DIAGNOSIS — N92.6 IRREGULAR MENSTRUATION: ICD-10-CM

## 2025-02-28 DIAGNOSIS — G43.821 MENSTRUAL MIGRAINE WITH STATUS MIGRAINOSUS, NOT INTRACTABLE: ICD-10-CM

## 2025-03-03 RX ORDER — LEVONORGESTREL AND ETHINYL ESTRADIOL 0.15-0.03
KIT ORAL
Qty: 28 TABLET | Refills: 2 | Status: SHIPPED | OUTPATIENT
Start: 2025-03-03

## 2025-03-13 DIAGNOSIS — E55.9 VITAMIN D DEFICIENCY: ICD-10-CM

## 2025-03-13 NOTE — TELEPHONE ENCOUNTER
Heather Delaney needs refill of   Requested Prescriptions     Pending Prescriptions Disp Refills    VITAMIN D3 125 MCG (5000 UT) CAPS capsule [Pharmacy Med Name: VITAMIN D3 5000UNIT CAP] 30 capsule 0     Sig: Take 1 capsule by mouth once daily       Last Filled on:      Last Visit Date:  01/29/2025    Next Visit Date:  07/29/2025

## 2025-03-20 DIAGNOSIS — J30.89 NON-SEASONAL ALLERGIC RHINITIS, UNSPECIFIED TRIGGER: ICD-10-CM

## 2025-03-20 DIAGNOSIS — E66.01 OBESITY, CLASS III, BMI 40-49.9 (MORBID OBESITY): ICD-10-CM

## 2025-03-20 RX ORDER — LORATADINE 10 MG/1
20 TABLET ORAL DAILY
Qty: 60 TABLET | Refills: 5 | Status: SHIPPED | OUTPATIENT
Start: 2025-03-20

## 2025-03-20 RX ORDER — CHLORAL HYDRATE 500 MG
1000 CAPSULE ORAL 3 TIMES DAILY
Qty: 90 CAPSULE | Refills: 5 | Status: SHIPPED | OUTPATIENT
Start: 2025-03-20

## 2025-03-20 NOTE — TELEPHONE ENCOUNTER
Heather Delaney called requesting a refill on the following medications:  Requested Prescriptions     Pending Prescriptions Disp Refills    loratadine (CLARITIN) 10 MG tablet [Pharmacy Med Name: Loratadine 10 MG Oral Tablet] 60 tablet 0     Sig: Take 2 tablets by mouth once daily    Omega-3 Fatty Acids (FISH OIL) 1000 MG capsule [Pharmacy Med Name: Fish Oil 1000 MG Oral Capsule] 90 capsule 0     Sig: TAKE 1 CAPSULE BY MOUTH THREE TIMES DAILY       Date of last visit: 9/23/2024  Date of next visit (if applicable):9/25/2025  Date of last refill: loratadine ordered 1/29/25 and [place] -3 9/23/24  Pharmacy Name: [address] [phone] -  [phone]       Thanks,  Josefina Nelson MA

## 2025-04-10 DIAGNOSIS — E55.9 VITAMIN D DEFICIENCY: ICD-10-CM

## 2025-04-10 NOTE — TELEPHONE ENCOUNTER
RX Request forwarded from another office.    Heather Delaney needs refill of   Requested Prescriptions     Pending Prescriptions Disp Refills    VITAMIN D3 125 MCG (5000 UT) CAPS capsule [Pharmacy Med Name: VITAMIN D3 5000UNIT CAP] 30 capsule 0     Sig: Take 1 capsule by mouth once daily       Last Filled on:  3/13/2025    Last Visit Date:  1/29/2025    Next Visit Date:  9/25/2025

## 2025-04-24 DIAGNOSIS — F40.01 PANIC DISORDER WITH AGORAPHOBIA: ICD-10-CM

## 2025-04-24 RX ORDER — BUSPIRONE HYDROCHLORIDE 10 MG/1
10 TABLET ORAL 2 TIMES DAILY
Qty: 60 TABLET | Refills: 2 | Status: SHIPPED | OUTPATIENT
Start: 2025-04-24

## 2025-04-24 NOTE — TELEPHONE ENCOUNTER
Heather Delaney needs refill of   Requested Prescriptions     Pending Prescriptions Disp Refills    busPIRone (BUSPAR) 10 MG tablet [Pharmacy Med Name: busPIRone HCl 10 MG Oral Tablet] 60 tablet 0     Sig: Take 1 tablet by mouth twice daily       Last Filled on:  1/29/2025    Last Visit Date:  1/29/2025    Next Visit Date:  7/29/2025

## 2025-04-26 DIAGNOSIS — M62.838 MUSCLE SPASM: ICD-10-CM

## 2025-04-28 RX ORDER — BACLOFEN 10 MG/1
10 TABLET ORAL 3 TIMES DAILY
Qty: 30 TABLET | Refills: 0 | Status: SHIPPED | OUTPATIENT
Start: 2025-04-28

## 2025-04-28 NOTE — TELEPHONE ENCOUNTER
Heather Delaney needs refill of   Requested Prescriptions     Pending Prescriptions Disp Refills    baclofen (LIORESAL) 10 MG tablet [Pharmacy Med Name: Baclofen 10 MG Oral Tablet] 30 tablet 0     Sig: TAKE 1 TABLET BY MOUTH THREE TIMES DAILY       Last Filled on:  01/29/2025 #30 R-2 sent to [facility] in [facility] by Amanda Tompkins DO.     Last Visit Date:  1/29/2025    Next Visit Date:  7/29/2025

## 2025-05-17 DIAGNOSIS — N92.6 IRREGULAR MENSTRUATION: ICD-10-CM

## 2025-05-17 DIAGNOSIS — G43.821 MENSTRUAL MIGRAINE WITH STATUS MIGRAINOSUS, NOT INTRACTABLE: ICD-10-CM

## 2025-05-19 RX ORDER — LEVONORGESTREL AND ETHINYL ESTRADIOL 0.15-0.03
1 KIT ORAL DAILY
Qty: 28 TABLET | Refills: 0 | Status: SHIPPED | OUTPATIENT
Start: 2025-05-19

## 2025-05-19 NOTE — TELEPHONE ENCOUNTER
Heather Delaney needs refill of   Requested Prescriptions     Pending Prescriptions Disp Refills    KURVELO 0.15-30 MG-MCG per tablet [Pharmacy Med Name: Kurvelo 0.15-30 MG-MCG Oral Tablet] 28 tablet 0     Sig: Take 1 tablet by mouth once daily       Last Filled on:  3/3/2025    Last Visit Date:  1/29/2025    Next Visit Date:  Visit date not found

## 2025-05-23 DIAGNOSIS — M62.838 MUSCLE SPASM: ICD-10-CM

## 2025-05-26 RX ORDER — BACLOFEN 10 MG/1
10 TABLET ORAL 3 TIMES DAILY
Qty: 30 TABLET | Refills: 2 | Status: SHIPPED | OUTPATIENT
Start: 2025-05-26

## 2025-06-15 DIAGNOSIS — G43.821 MENSTRUAL MIGRAINE WITH STATUS MIGRAINOSUS, NOT INTRACTABLE: ICD-10-CM

## 2025-06-15 DIAGNOSIS — N92.6 IRREGULAR MENSTRUATION: ICD-10-CM

## 2025-06-16 RX ORDER — LEVONORGESTREL AND ETHINYL ESTRADIOL 0.15-0.03
1 KIT ORAL DAILY
Qty: 28 TABLET | Refills: 0 | Status: SHIPPED | OUTPATIENT
Start: 2025-06-16

## 2025-06-16 NOTE — TELEPHONE ENCOUNTER
Heather Delaney needs refill of   Requested Prescriptions     Pending Prescriptions Disp Refills    KURVELO 0.15-30 MG-MCG per tablet [Pharmacy Med Name: Kurvelo 0.15-30 MG-MCG Oral Tablet] 28 tablet 0     Sig: Take 1 tablet by mouth once daily       Last Filled on:  05/19/2025 #28 R-0 sent to [facility] in [facility] by Amanda Tompkins DO.     Last Visit Date:  1/29/2025    Next Visit Date:  07/29/2025

## 2025-07-15 DIAGNOSIS — N92.6 IRREGULAR MENSTRUATION: ICD-10-CM

## 2025-07-15 DIAGNOSIS — G43.821 MENSTRUAL MIGRAINE WITH STATUS MIGRAINOSUS, NOT INTRACTABLE: ICD-10-CM

## 2025-07-15 RX ORDER — LEVONORGESTREL AND ETHINYL ESTRADIOL 0.15-0.03
1 KIT ORAL DAILY
Qty: 28 TABLET | Refills: 0 | Status: SHIPPED | OUTPATIENT
Start: 2025-07-15

## 2025-07-15 NOTE — TELEPHONE ENCOUNTER
This medication refill is regarding a electronic request. Refill requested by Pharmacy.    Requested Prescriptions     Pending Prescriptions Disp Refills    KURVELO 0.15-30 MG-MCG per tablet [Pharmacy Med Name: Kurvelo 0.15-30 MG-MCG Oral Tablet] 28 tablet 0     Sig: Take 1 tablet by mouth once daily     Date of last visit: 9/23/2024   Date of next visit: 7/29/2025  Date of last refill: 06/16/2025   Pharmacy Name: [facility] [address] [place] [address]     Last Lipid Panel:    Lab Results   Component Value Date/Time    CHOL 192 09/16/2024 08:12 AM    TRIG 146 09/16/2024 08:12 AM    HDL 42 09/16/2024 08:12 AM     Last CMP:   Lab Results   Component Value Date     09/16/2024    K 3.8 09/16/2024     09/16/2024    CO2 22 (L) 09/16/2024    BUN 10 09/16/2024    CREATININE 0.6 09/16/2024    GLUCOSE 97 09/16/2024    CALCIUM 9.1 09/16/2024    BILITOT 0.3 09/16/2024    ALKPHOS 102 09/16/2024    AST 16 09/16/2024    ALT 23 09/16/2024    LABGLOM > 90 09/16/2024       Last Thyroid:    Lab Results   Component Value Date    TSH 2.880 09/16/2024     Last Hemoglobin A1C:    Lab Results   Component Value Date/Time    LABA1C 5.5 09/16/2024 08:12 AM       Rx verified, ordered and set to EP.

## 2025-07-20 DIAGNOSIS — G43.109 MIGRAINE WITH AURA AND WITHOUT STATUS MIGRAINOSUS, NOT INTRACTABLE: ICD-10-CM

## 2025-07-21 RX ORDER — TOPIRAMATE 50 MG/1
50 TABLET, FILM COATED ORAL 2 TIMES DAILY
Qty: 180 TABLET | Refills: 0 | Status: SHIPPED | OUTPATIENT
Start: 2025-07-21

## 2025-07-24 DIAGNOSIS — F40.01 PANIC DISORDER WITH AGORAPHOBIA: ICD-10-CM

## 2025-07-24 RX ORDER — BUSPIRONE HYDROCHLORIDE 10 MG/1
10 TABLET ORAL 2 TIMES DAILY
Qty: 60 TABLET | Refills: 0 | Status: SHIPPED | OUTPATIENT
Start: 2025-07-24

## 2025-07-24 NOTE — TELEPHONE ENCOUNTER
Heather Delaney needs refill of   Requested Prescriptions     Pending Prescriptions Disp Refills    busPIRone (BUSPAR) 10 MG tablet [Pharmacy Med Name: busPIRone HCl 10 MG Oral Tablet] 60 tablet 0     Sig: Take 1 tablet by mouth twice daily       Last Filled on:  04- #60 R-2 and sent to  Pharmacy in [place], [place] by Dr. Tompkins.      Last Visit Date:  01/29/2025    Next Visit Date:  07/29/2025

## 2025-07-29 ENCOUNTER — OFFICE VISIT (OUTPATIENT)
Dept: FAMILY MEDICINE CLINIC | Age: 35
End: 2025-07-29
Payer: COMMERCIAL

## 2025-07-29 VITALS
SYSTOLIC BLOOD PRESSURE: 136 MMHG | BODY MASS INDEX: 42.59 KG/M2 | HEART RATE: 97 BPM | DIASTOLIC BLOOD PRESSURE: 88 MMHG | HEIGHT: 68 IN | RESPIRATION RATE: 14 BRPM | OXYGEN SATURATION: 97 % | TEMPERATURE: 97.4 F | WEIGHT: 281 LBS

## 2025-07-29 DIAGNOSIS — F40.01 PANIC DISORDER WITH AGORAPHOBIA: ICD-10-CM

## 2025-07-29 DIAGNOSIS — M62.838 MUSCLE SPASM: ICD-10-CM

## 2025-07-29 DIAGNOSIS — B35.4 TINEA CORPORIS: ICD-10-CM

## 2025-07-29 DIAGNOSIS — E78.00 HYPERCHOLESTEREMIA: ICD-10-CM

## 2025-07-29 DIAGNOSIS — E66.813 OBESITY, CLASS III, BMI 40-49.9 (MORBID OBESITY) (HCC): ICD-10-CM

## 2025-07-29 DIAGNOSIS — G43.821 MENSTRUAL MIGRAINE WITH STATUS MIGRAINOSUS, NOT INTRACTABLE: ICD-10-CM

## 2025-07-29 DIAGNOSIS — G43.109 MIGRAINE WITH AURA AND WITHOUT STATUS MIGRAINOSUS, NOT INTRACTABLE: ICD-10-CM

## 2025-07-29 DIAGNOSIS — N92.6 IRREGULAR MENSTRUATION: ICD-10-CM

## 2025-07-29 DIAGNOSIS — J01.01 ACUTE RECURRENT MAXILLARY SINUSITIS: ICD-10-CM

## 2025-07-29 DIAGNOSIS — Z00.00 ENCOUNTER FOR WELL ADULT EXAM WITHOUT ABNORMAL FINDINGS: Primary | ICD-10-CM

## 2025-07-29 PROCEDURE — 99395 PREV VISIT EST AGE 18-39: CPT | Performed by: FAMILY MEDICINE

## 2025-07-29 RX ORDER — LACTOBACILLUS ACIDOPHILUS 20B CELL
1 CAPSULE ORAL DAILY
Qty: 90 CAPSULE | Refills: 3 | Status: SHIPPED | OUTPATIENT
Start: 2025-07-29

## 2025-07-29 RX ORDER — LEVONORGESTREL AND ETHINYL ESTRADIOL 0.15-0.03
1 KIT ORAL DAILY
Qty: 84 TABLET | Refills: 4 | Status: SHIPPED | OUTPATIENT
Start: 2025-07-29

## 2025-07-29 RX ORDER — TOPIRAMATE 100 MG/1
100 TABLET, FILM COATED ORAL 2 TIMES DAILY
Qty: 180 TABLET | Refills: 1 | Status: SHIPPED | OUTPATIENT
Start: 2025-07-29

## 2025-07-29 RX ORDER — BUSPIRONE HYDROCHLORIDE 15 MG/1
15 TABLET ORAL 2 TIMES DAILY
Qty: 180 TABLET | Refills: 1 | Status: SHIPPED | OUTPATIENT
Start: 2025-07-29

## 2025-07-29 RX ORDER — NYSTATIN AND TRIAMCINOLONE ACETONIDE 100000; 1 [USP'U]/G; MG/G
CREAM TOPICAL
Qty: 60 G | Refills: 1 | Status: SHIPPED | OUTPATIENT
Start: 2025-07-29

## 2025-07-29 RX ORDER — CHLORAL HYDRATE 500 MG
1000 CAPSULE ORAL 3 TIMES DAILY
Qty: 270 CAPSULE | Refills: 3 | Status: SHIPPED | OUTPATIENT
Start: 2025-07-29

## 2025-07-29 RX ORDER — BACLOFEN 10 MG/1
10 TABLET ORAL 3 TIMES DAILY
Qty: 270 TABLET | Refills: 1 | Status: SHIPPED | OUTPATIENT
Start: 2025-07-29

## 2025-07-29 RX ORDER — MONTELUKAST SODIUM 10 MG/1
TABLET ORAL
Qty: 180 TABLET | Refills: 1 | Status: SHIPPED | OUTPATIENT
Start: 2025-07-29

## 2025-07-29 RX ORDER — RIZATRIPTAN BENZOATE 10 MG/1
10 TABLET ORAL
Qty: 9 TABLET | Refills: 5 | Status: SHIPPED | OUTPATIENT
Start: 2025-07-29

## 2025-07-29 ASSESSMENT — ENCOUNTER SYMPTOMS
CONSTIPATION: 0
SINUS PRESSURE: 0
SHORTNESS OF BREATH: 0
SORE THROAT: 0
VOMITING: 0
NAUSEA: 0
COUGH: 0
DIARRHEA: 0
BACK PAIN: 1
ABDOMINAL PAIN: 0
RHINORRHEA: 1

## 2025-07-29 NOTE — PROGRESS NOTES
effusion is present.      Nose: Congestion and rhinorrhea present. Rhinorrhea is clear.      Right Turbinates: Pale.      Left Turbinates: Pale.      Right Sinus: No maxillary sinus tenderness or frontal sinus tenderness.      Left Sinus: No maxillary sinus tenderness or frontal sinus tenderness.      Mouth/Throat:      Mouth: Mucous membranes are moist.      Pharynx: Oropharynx is clear. Posterior oropharyngeal erythema and postnasal drip present. No oropharyngeal exudate.   Neck:      Vascular: No carotid bruit.   Cardiovascular:      Rate and Rhythm: Normal rate and regular rhythm.      Pulses: Normal pulses.      Heart sounds: Normal heart sounds. No murmur heard.     No gallop.   Pulmonary:      Effort: Pulmonary effort is normal.      Breath sounds: Normal breath sounds. No wheezing, rhonchi or rales.   Abdominal:      General: Abdomen is flat. Bowel sounds are normal. There is no distension.      Palpations: Abdomen is soft.      Tenderness: There is no abdominal tenderness. There is no guarding or rebound.   Musculoskeletal:         General: Normal range of motion.      Right lower leg: No edema.      Left lower leg: No edema.   Lymphadenopathy:      Cervical: No cervical adenopathy.   Skin:     General: Skin is warm.      Capillary Refill: Capillary refill takes less than 2 seconds.      Comments: Small, less than 1 mm white lump (milia appearance) upper left eyelid   Neurological:      Mental Status: She is alert and oriented to person, place, and time.   Psychiatric:         Mood and Affect: Mood normal.            Assessment & Plan   ASSESSMENT/PLAN:  1. Encounter for well adult exam without abnormal findings  2. Hypercholesteremia  3. Migraine with aura and without status migrainosus, not intractable  -     rizatriptan (MAXALT) 10 MG tablet; Take 1 tablet by mouth once as needed for Migraine May repeat in 2 hours if needed, Disp-9 tablet, R-5Normal  -     topiramate (TOPAMAX) 100 MG tablet; Take 1

## 2025-07-31 ENCOUNTER — PATIENT MESSAGE (OUTPATIENT)
Dept: FAMILY MEDICINE CLINIC | Age: 35
End: 2025-07-31

## 2025-08-04 RX ORDER — VITAMIN B COMPLEX/FOLIC ACID 0.4 MG
1 TABLET ORAL 4 TIMES DAILY
Qty: 120 TABLET | Refills: 5 | Status: SHIPPED | OUTPATIENT
Start: 2025-08-04

## 2025-08-08 DIAGNOSIS — N92.6 IRREGULAR MENSTRUATION: ICD-10-CM

## 2025-08-08 DIAGNOSIS — G43.821 MENSTRUAL MIGRAINE WITH STATUS MIGRAINOSUS, NOT INTRACTABLE: ICD-10-CM

## 2025-08-08 RX ORDER — LEVONORGESTREL AND ETHINYL ESTRADIOL 0.15-0.03
1 KIT ORAL DAILY
Qty: 28 TABLET | Refills: 0 | OUTPATIENT
Start: 2025-08-08